# Patient Record
Sex: MALE | Race: WHITE
[De-identification: names, ages, dates, MRNs, and addresses within clinical notes are randomized per-mention and may not be internally consistent; named-entity substitution may affect disease eponyms.]

---

## 2017-01-07 ENCOUNTER — HOSPITAL ENCOUNTER (EMERGENCY)
Dept: HOSPITAL 71 - ER | Age: 37
Discharge: HOME | End: 2017-01-07
Payer: MEDICARE

## 2017-01-07 PROCEDURE — 81003 URINALYSIS AUTO W/O SCOPE: CPT

## 2017-01-07 PROCEDURE — 87804 INFLUENZA ASSAY W/OPTIC: CPT

## 2017-01-07 PROCEDURE — 99283 EMERGENCY DEPT VISIT LOW MDM: CPT

## 2017-01-07 PROCEDURE — 71010: CPT

## 2017-01-07 PROCEDURE — 96375 TX/PRO/DX INJ NEW DRUG ADDON: CPT

## 2017-01-07 PROCEDURE — 80053 COMPREHEN METABOLIC PANEL: CPT

## 2017-01-07 PROCEDURE — 36415 COLL VENOUS BLD VENIPUNCTURE: CPT

## 2017-01-07 PROCEDURE — 96365 THER/PROPH/DIAG IV INF INIT: CPT

## 2017-01-07 PROCEDURE — 85025 COMPLETE CBC W/AUTO DIFF WBC: CPT

## 2019-11-30 ENCOUNTER — HOSPITAL ENCOUNTER (OUTPATIENT)
Dept: URGENT CARE | Facility: CLINIC | Age: 39
Discharge: HOME | End: 2019-11-30
Attending: FAMILY MEDICINE

## 2020-08-07 ENCOUNTER — OFFICE VISIT CONVERTED (OUTPATIENT)
Dept: UROLOGY | Facility: CLINIC | Age: 40
End: 2020-08-07
Attending: NURSE PRACTITIONER

## 2021-05-10 NOTE — H&P
History and Physical      Patient Name: Jaleel Garcia   Patient ID: [id number]   Sex: Male   YOB: 1980    Primary Care Provider: Nicolás Chatterjee MD   Referring Provider: iNcolás Chatterjee MD    Visit Date: August 7, 2020    Provider: [facility]   Location: [facility]   Location Address: [address] [place] [address]  [id number]   Location Phone: [phone]          Chief Complaint     F/U UTI       History Of Present Illness     Mr. Garcia is a 40 year old gentleman with cognitive delay, enuresis, nocturia, and recent UTI. he was previously on oxybutynin and this did not alleviate his urinary incontinence.  It was previously suspected that his issue was related to his medication and just dulling his response to a full bladder.  Primary care provider sent him for a referral related to his recent urinary tract infection that required hospitalization as urinary tract infections are not common in this age group.  Caregiver reports that he has lost 50 pounds unintentionally over the last 6 months.       Past Medical History  Allergic rhinitis, chronic; Bladder Disorder; Enuresis; Epilepsy; Generalized anxiety disorder; Hyperthyroidism; Hypothyroidism; Mental Retardation; Neurologic disorder; Seizures; Vesicoureteral Reflux         Past Surgical History  Kidney Surgery; Oral Surgery         Medication List  Calcium 500 + D 500 mg(1,250mg) -200 unit oral tablet; Depakote  mg oral tablet extended release 24 hr; desmopressin 0.2 mg oral tablet; Keppra 750 mg oral tablet; Synthroid 50 mcg oral tablet; Zoloft 100 mg oral tablet; Zyrtec 10 mg oral tablet         Allergy List  NO KNOWN DRUG ALLERGIES         Family Medical History  Anxiety Disorder, Generalized; Colon Neoplasm, Sigmoid, Malignant; Lung Neoplasm, Malignant; Heart Disease; Throat Cancer; -Father's Family History Unknown; -Mother's Family History Unknown         Social History  Tobacco (Never)         Review of  "Systems  · Constitutional  o Denies  o : fatigue, fever  · Breasts  o Denies  o : lumps  · Cardiovascular  o Denies  o : chest pain, heart disease, heart attack  · Respiratory  o Denies  o : lung disease, shortness of breath, asthma  · Gastrointestinal  o Denies  o : stomach or bowel disease, blood in stools, ulcers  · Genitourinary  o Admits  o : frequent urination , urgency incontinence, urinary leakage, voiding at night  · Integument  o Denies  o : rash, itching  · Neurologic  o Denies  o : neurologic disease  · Musculoskeletal  o Denies  o : swelling or pain in your lower extremities  · Endocrine  o Denies  o : polyuria, polydipsia  · Psychiatric  o Denies  o : anxiety, depression, hallucinations  · Heme-Lymph  o Denies  o : lightheadedness, easy bleeding, easy bruising      Vitals  Date Time BP Position Site L\R Cuff Size HR RR TEMP (F) WT  HT  BMI kg/m2 BSA m2 O2 Sat        08/07/2020 09:57 AM       15  247lbs 6oz 5'  8\" 37.61 2.32           Physical Examination  · Constitutional  o Appearance  o : well-nourished, well developed, alert, in no acute distress  · Head and Face  o Head  o :   § Inspection  § : atraumatic, normocephalic  o Face  o :   § Inspection  § : no facial lesions  · Eyes  o Sclerae  o : sclerae white  · Ears, Nose, Mouth and Throat  o Ears  o :   § External Ears  § : appearance within normal limits, no lesions present  o Nose  o :   § External Nose  § : appearance normal  · Neck  o Inspection/Palpation  o : normal appearance, trachea midline  · Respiratory  o Respiratory Effort  o : breathing unlabored  o Inspection of Chest  o : normal appearance, no retractions  · Skin and Subcutaneous Tissue  o General Inspection  o : no rashes or lesions present, no lesions present, no areas of discoloration  · Neurologic  o Mental Status Examination  o :   § Orientation  § : grossly oriented to person, place and time  § Speech/Language  § : communication ability within normal limits  o Gait and " Station  o : normal gait, able to stand without difficulty  · Psychiatric  o Judgement and Insight  o : judgment and insight intact, judgement for everyday activities and social situations within normal limits, insight intact  o Mood and Affect  o : mood normal, affect appropriate          Results  · In-Office Procedures  o Lab procedure  § Automated dipstick urinalysis with microscopy (99609)   § Color Ur: Yellow   § Clarity Ur: Clear   § Glucose Ur Ql Strip: Negative   § Bilirub Ur Ql Strip: Negative   § Ketones Ur Ql Strip: Negative   § Sp Gr Ur Qn: 1.020   § Hgb Ur Ql Strip: Negative   § pH Ur-LsCnc: 7.0   § Prot Ur Ql Strip: Negative   § Urobilinogen Ur Strip-mCnc: 0.2   § Nitrite Ur Ql Strip: Negative   § WBC Est Ur Ql Strip: Negative   § RBC UrnS Qn HPF: 0   § WBC UrnS Qn HPF: 0   § Bacteria UrnS Qn HPF: 0   § Crystals UrnS Qn HPF: 0   § Epithelial Cells (non renal): 0 /HPF  § Epithelial Cells (renal): 0   o Surgical procedure  § IOP - Bladder Scan/Residual Urine (80017)   § Specimen vol Ur: 101       Assessment  · Nocturia     788.43/R35.1  · Enuresis     307.6  · Urinary incontinence     788.30/R32    Problems Reconciled  Plan  · Medications  o Medications have been Reconciled  o Transition of Care or Provider Policy  · Instructions  o Urodynamic study as the cause of the patient's urinary incontinence is unclear at this point. It is suspected that the patient is having overflow incontinence versus overactive bladder symptoms although this is been going on for the last 6 years and he is never achieved nighttime urinary continence his entire life.  o Follow Up 1 week post uds to discuss poc                   Electronically Signed by: Urvashi Drummond APRN -Author on August 7, 2020 10:23:51 AM

## 2021-05-15 VITALS — WEIGHT: 247.37 LBS | HEIGHT: 68 IN | BODY MASS INDEX: 37.49 KG/M2 | RESPIRATION RATE: 15 BRPM

## 2021-12-26 ENCOUNTER — APPOINTMENT (OUTPATIENT)
Dept: CT IMAGING | Facility: HOSPITAL | Age: 41
End: 2021-12-26

## 2021-12-26 ENCOUNTER — APPOINTMENT (OUTPATIENT)
Dept: GENERAL RADIOLOGY | Facility: HOSPITAL | Age: 41
End: 2021-12-26

## 2021-12-26 ENCOUNTER — HOSPITAL ENCOUNTER (OUTPATIENT)
Facility: HOSPITAL | Age: 41
Setting detail: OBSERVATION
Discharge: HOME | End: 2021-12-28
Attending: EMERGENCY MEDICINE | Admitting: STUDENT IN AN ORGANIZED HEALTH CARE EDUCATION/TRAINING PROGRAM

## 2021-12-26 DIAGNOSIS — R56.9 SEIZURE: Primary | ICD-10-CM

## 2021-12-26 LAB
ALBUMIN SERPL-MCNC: 4.6 G/DL (ref 3.5–5.2)
ALBUMIN/GLOB SERPL: 1.3 G/DL
ALP SERPL-CCNC: 74 U/L (ref 39–117)
ALT SERPL W P-5'-P-CCNC: 23 U/L (ref 1–41)
ANION GAP SERPL CALCULATED.3IONS-SCNC: 29.1 MMOL/L (ref 5–15)
AST SERPL-CCNC: 23 U/L (ref 1–40)
BILIRUB SERPL-MCNC: 0.3 MG/DL (ref 0–1.2)
BILIRUB UR QL STRIP: NEGATIVE
BUN SERPL-MCNC: 13 MG/DL (ref 6–20)
BUN/CREAT SERPL: 11.5 (ref 7–25)
CALCIUM SPEC-SCNC: 9.5 MG/DL (ref 8.6–10.5)
CHLORIDE SERPL-SCNC: 97 MMOL/L (ref 98–107)
CK SERPL-CCNC: 141 U/L (ref 20–200)
CLARITY UR: CLEAR
CLUMPED PLATELETS: PRESENT
CO2 SERPL-SCNC: 10.9 MMOL/L (ref 22–29)
COLOR UR: YELLOW
CREAT SERPL-MCNC: 1.13 MG/DL (ref 0.76–1.27)
D-LACTATE SERPL-SCNC: 17.8 MMOL/L (ref 0.5–2)
D-LACTATE SERPL-SCNC: 2.7 MMOL/L (ref 0.5–2)
DEPRECATED RDW RBC AUTO: 42.4 FL (ref 37–54)
ERYTHROCYTE [DISTWIDTH] IN BLOOD BY AUTOMATED COUNT: 12.9 % (ref 12.3–15.4)
GFR SERPL CREATININE-BSD FRML MDRD: 72 ML/MIN/1.73
GLOBULIN UR ELPH-MCNC: 3.5 GM/DL
GLUCOSE SERPL-MCNC: 104 MG/DL (ref 65–99)
GLUCOSE UR STRIP-MCNC: NEGATIVE MG/DL
HCT VFR BLD AUTO: 48 % (ref 37.5–51)
HGB BLD-MCNC: 16.2 G/DL (ref 13–17.7)
HGB UR QL STRIP.AUTO: NEGATIVE
INR PPP: 1.1 (ref 2–3)
KETONES UR QL STRIP: NEGATIVE
LARGE PLATELETS: ABNORMAL
LEUKOCYTE ESTERASE UR QL STRIP.AUTO: NEGATIVE
LIPASE SERPL-CCNC: 25 U/L (ref 13–60)
LYMPHOCYTES # BLD MANUAL: 7.7 10*3/MM3 (ref 0.7–3.1)
LYMPHOCYTES NFR BLD MANUAL: 4 % (ref 5–12)
MAGNESIUM SERPL-MCNC: 2.8 MG/DL (ref 1.6–2.6)
MCH RBC QN AUTO: 30.5 PG (ref 26.6–33)
MCHC RBC AUTO-ENTMCNC: 33.8 G/DL (ref 31.5–35.7)
MCV RBC AUTO: 90.4 FL (ref 79–97)
MONOCYTES # BLD: 0.51 10*3/MM3 (ref 0.1–0.9)
NEUTROPHILS # BLD AUTO: 4.62 10*3/MM3 (ref 1.7–7)
NEUTROPHILS NFR BLD MANUAL: 34 % (ref 42.7–76)
NEUTS BAND NFR BLD MANUAL: 2 % (ref 0–5)
NITRITE UR QL STRIP: NEGATIVE
PH UR STRIP.AUTO: 6 [PH] (ref 5–8)
PLATELET # BLD AUTO: 253 10*3/MM3 (ref 140–450)
PMV BLD AUTO: 11.3 FL (ref 6–12)
POTASSIUM SERPL-SCNC: 3.9 MMOL/L (ref 3.5–5.2)
PROT SERPL-MCNC: 8.1 G/DL (ref 6–8.5)
PROT UR QL STRIP: ABNORMAL
PROTHROMBIN TIME: 11.4 SECONDS (ref 9.4–12)
RBC # BLD AUTO: 5.31 10*6/MM3 (ref 4.14–5.8)
RBC MORPH BLD: NORMAL
SMALL PLATELETS BLD QL SMEAR: ADEQUATE
SODIUM SERPL-SCNC: 137 MMOL/L (ref 136–145)
SP GR UR STRIP: 1.01 (ref 1–1.03)
T4 FREE SERPL-MCNC: 1.02 NG/DL (ref 0.93–1.7)
TSH SERPL DL<=0.05 MIU/L-ACNC: 3.1 UIU/ML (ref 0.27–4.2)
UROBILINOGEN UR QL STRIP: ABNORMAL
VALPROATE SERPL-MCNC: 76.6 MCG/ML (ref 50–125)
VARIANT LYMPHS NFR BLD MANUAL: 23 % (ref 0–5)
VARIANT LYMPHS NFR BLD MANUAL: 37 % (ref 19.6–45.3)
WBC MORPH BLD: NORMAL
WBC NRBC COR # BLD: 12.84 10*3/MM3 (ref 3.4–10.8)

## 2021-12-26 PROCEDURE — 96375 TX/PRO/DX INJ NEW DRUG ADDON: CPT

## 2021-12-26 PROCEDURE — G0378 HOSPITAL OBSERVATION PER HR: HCPCS

## 2021-12-26 PROCEDURE — 83690 ASSAY OF LIPASE: CPT | Performed by: EMERGENCY MEDICINE

## 2021-12-26 PROCEDURE — 84443 ASSAY THYROID STIM HORMONE: CPT | Performed by: EMERGENCY MEDICINE

## 2021-12-26 PROCEDURE — 99219 PR INITIAL OBSERVATION CARE/DAY 50 MINUTES: CPT | Performed by: PHYSICIAN ASSISTANT

## 2021-12-26 PROCEDURE — 85025 COMPLETE CBC W/AUTO DIFF WBC: CPT | Performed by: EMERGENCY MEDICINE

## 2021-12-26 PROCEDURE — 71045 X-RAY EXAM CHEST 1 VIEW: CPT

## 2021-12-26 PROCEDURE — 83605 ASSAY OF LACTIC ACID: CPT | Performed by: EMERGENCY MEDICINE

## 2021-12-26 PROCEDURE — 82550 ASSAY OF CK (CPK): CPT | Performed by: EMERGENCY MEDICINE

## 2021-12-26 PROCEDURE — 36415 COLL VENOUS BLD VENIPUNCTURE: CPT

## 2021-12-26 PROCEDURE — 99284 EMERGENCY DEPT VISIT MOD MDM: CPT

## 2021-12-26 PROCEDURE — 70450 CT HEAD/BRAIN W/O DYE: CPT

## 2021-12-26 PROCEDURE — 96365 THER/PROPH/DIAG IV INF INIT: CPT

## 2021-12-26 PROCEDURE — 85610 PROTHROMBIN TIME: CPT | Performed by: EMERGENCY MEDICINE

## 2021-12-26 PROCEDURE — 80164 ASSAY DIPROPYLACETIC ACD TOT: CPT | Performed by: EMERGENCY MEDICINE

## 2021-12-26 PROCEDURE — 81003 URINALYSIS AUTO W/O SCOPE: CPT | Performed by: EMERGENCY MEDICINE

## 2021-12-26 PROCEDURE — 82962 GLUCOSE BLOOD TEST: CPT

## 2021-12-26 PROCEDURE — 0 LEVETIRACETAM IN NACL 0.75% 1000 MG/100ML SOLUTION: Performed by: EMERGENCY MEDICINE

## 2021-12-26 PROCEDURE — 80053 COMPREHEN METABOLIC PANEL: CPT | Performed by: EMERGENCY MEDICINE

## 2021-12-26 PROCEDURE — 83735 ASSAY OF MAGNESIUM: CPT | Performed by: EMERGENCY MEDICINE

## 2021-12-26 PROCEDURE — 84439 ASSAY OF FREE THYROXINE: CPT | Performed by: EMERGENCY MEDICINE

## 2021-12-26 PROCEDURE — 85007 BL SMEAR W/DIFF WBC COUNT: CPT | Performed by: EMERGENCY MEDICINE

## 2021-12-26 RX ORDER — ACETAMINOPHEN 160 MG/5ML
650 SOLUTION ORAL EVERY 4 HOURS PRN
Status: DISCONTINUED | OUTPATIENT
Start: 2021-12-26 | End: 2021-12-28 | Stop reason: HOSPADM

## 2021-12-26 RX ORDER — CETIRIZINE HYDROCHLORIDE 10 MG/1
10 TABLET ORAL DAILY
COMMUNITY
End: 2022-10-26 | Stop reason: SDUPTHER

## 2021-12-26 RX ORDER — POLYETHYLENE GLYCOL 3350 17 G/17G
17 POWDER, FOR SOLUTION ORAL DAILY PRN
Status: DISCONTINUED | OUTPATIENT
Start: 2021-12-26 | End: 2021-12-28 | Stop reason: HOSPADM

## 2021-12-26 RX ORDER — BISACODYL 5 MG/1
5 TABLET, DELAYED RELEASE ORAL DAILY PRN
Status: DISCONTINUED | OUTPATIENT
Start: 2021-12-26 | End: 2021-12-28 | Stop reason: HOSPADM

## 2021-12-26 RX ORDER — ACETAMINOPHEN 325 MG/1
650 TABLET ORAL EVERY 4 HOURS PRN
Status: DISCONTINUED | OUTPATIENT
Start: 2021-12-26 | End: 2021-12-28 | Stop reason: HOSPADM

## 2021-12-26 RX ORDER — DIVALPROEX SODIUM 500 MG/1
1000 TABLET, EXTENDED RELEASE ORAL DAILY
COMMUNITY
End: 2022-10-26 | Stop reason: SDUPTHER

## 2021-12-26 RX ORDER — SERTRALINE HYDROCHLORIDE 100 MG/1
150 TABLET, FILM COATED ORAL DAILY
COMMUNITY
End: 2022-10-06 | Stop reason: SDUPTHER

## 2021-12-26 RX ORDER — SODIUM CHLORIDE 0.9 % (FLUSH) 0.9 %
10 SYRINGE (ML) INJECTION AS NEEDED
Status: DISCONTINUED | OUTPATIENT
Start: 2021-12-26 | End: 2021-12-28 | Stop reason: HOSPADM

## 2021-12-26 RX ORDER — ACETAMINOPHEN 650 MG/1
650 SUPPOSITORY RECTAL EVERY 4 HOURS PRN
Status: DISCONTINUED | OUTPATIENT
Start: 2021-12-26 | End: 2021-12-28 | Stop reason: HOSPADM

## 2021-12-26 RX ORDER — BISACODYL 10 MG
10 SUPPOSITORY, RECTAL RECTAL DAILY PRN
Status: DISCONTINUED | OUTPATIENT
Start: 2021-12-26 | End: 2021-12-28 | Stop reason: HOSPADM

## 2021-12-26 RX ORDER — AMOXICILLIN 250 MG
2 CAPSULE ORAL 2 TIMES DAILY
Status: DISCONTINUED | OUTPATIENT
Start: 2021-12-26 | End: 2021-12-28 | Stop reason: HOSPADM

## 2021-12-26 RX ORDER — DESMOPRESSIN ACETATE 0.2 MG/1
0.2 TABLET ORAL 2 TIMES DAILY
COMMUNITY
End: 2022-10-26 | Stop reason: SDUPTHER

## 2021-12-26 RX ORDER — LEVETIRACETAM 10 MG/ML
1000 INJECTION INTRAVASCULAR ONCE
Status: COMPLETED | OUTPATIENT
Start: 2021-12-26 | End: 2021-12-26

## 2021-12-26 RX ORDER — SODIUM CHLORIDE 0.9 % (FLUSH) 0.9 %
10 SYRINGE (ML) INJECTION EVERY 12 HOURS SCHEDULED
Status: DISCONTINUED | OUTPATIENT
Start: 2021-12-26 | End: 2021-12-28 | Stop reason: HOSPADM

## 2021-12-26 RX ORDER — DIVALPROEX SODIUM 500 MG/1
1000 TABLET, EXTENDED RELEASE ORAL DAILY
Status: DISCONTINUED | OUTPATIENT
Start: 2021-12-27 | End: 2021-12-28 | Stop reason: HOSPADM

## 2021-12-26 RX ORDER — LEVETIRACETAM 750 MG/1
750 TABLET ORAL 2 TIMES DAILY
COMMUNITY
End: 2022-09-30 | Stop reason: SDUPTHER

## 2021-12-26 RX ORDER — LEVOTHYROXINE SODIUM 0.05 MG/1
50 TABLET ORAL
COMMUNITY
End: 2022-10-26 | Stop reason: SDUPTHER

## 2021-12-26 RX ADMIN — SODIUM CHLORIDE, PRESERVATIVE FREE 10 ML: 5 INJECTION INTRAVENOUS at 23:41

## 2021-12-26 RX ADMIN — LEVETIRACETAM 1000 MG: 10 INJECTION, SOLUTION INTRAVENOUS at 18:33

## 2021-12-26 RX ADMIN — SODIUM CHLORIDE 1000 ML: 9 INJECTION, SOLUTION INTRAVENOUS at 18:35

## 2021-12-26 RX ADMIN — SODIUM CHLORIDE 1000 ML: 9 INJECTION, SOLUTION INTRAVENOUS at 20:08

## 2021-12-26 RX ADMIN — VALPROATE SODIUM 500 MG: 100 INJECTION, SOLUTION INTRAVENOUS at 20:59

## 2021-12-27 LAB
ANION GAP SERPL CALCULATED.3IONS-SCNC: 10.8 MMOL/L (ref 5–15)
BUN SERPL-MCNC: 10 MG/DL (ref 6–20)
BUN/CREAT SERPL: 12.5 (ref 7–25)
CALCIUM SPEC-SCNC: 8.7 MG/DL (ref 8.6–10.5)
CHLORIDE SERPL-SCNC: 105 MMOL/L (ref 98–107)
CO2 SERPL-SCNC: 22.2 MMOL/L (ref 22–29)
CREAT SERPL-MCNC: 0.8 MG/DL (ref 0.76–1.27)
DEPRECATED RDW RBC AUTO: 42.1 FL (ref 37–54)
ERYTHROCYTE [DISTWIDTH] IN BLOOD BY AUTOMATED COUNT: 13.2 % (ref 12.3–15.4)
GFR SERPL CREATININE-BSD FRML MDRD: 107 ML/MIN/1.73
GLUCOSE SERPL-MCNC: 86 MG/DL (ref 65–99)
HCT VFR BLD AUTO: 40.7 % (ref 37.5–51)
HGB BLD-MCNC: 14.1 G/DL (ref 13–17.7)
MCH RBC QN AUTO: 30.3 PG (ref 26.6–33)
MCHC RBC AUTO-ENTMCNC: 34.6 G/DL (ref 31.5–35.7)
MCV RBC AUTO: 87.5 FL (ref 79–97)
PLATELET # BLD AUTO: 155 10*3/MM3 (ref 140–450)
PMV BLD AUTO: 11 FL (ref 6–12)
POTASSIUM SERPL-SCNC: 4.1 MMOL/L (ref 3.5–5.2)
RBC # BLD AUTO: 4.65 10*6/MM3 (ref 4.14–5.8)
SODIUM SERPL-SCNC: 138 MMOL/L (ref 136–145)
WBC NRBC COR # BLD: 9.45 10*3/MM3 (ref 3.4–10.8)

## 2021-12-27 PROCEDURE — G0378 HOSPITAL OBSERVATION PER HR: HCPCS

## 2021-12-27 PROCEDURE — 99225 PR SBSQ OBSERVATION CARE/DAY 25 MINUTES: CPT | Performed by: STUDENT IN AN ORGANIZED HEALTH CARE EDUCATION/TRAINING PROGRAM

## 2021-12-27 PROCEDURE — 36415 COLL VENOUS BLD VENIPUNCTURE: CPT | Performed by: PHYSICIAN ASSISTANT

## 2021-12-27 PROCEDURE — 99203 OFFICE O/P NEW LOW 30 MIN: CPT | Performed by: PSYCHIATRY & NEUROLOGY

## 2021-12-27 PROCEDURE — 85027 COMPLETE CBC AUTOMATED: CPT | Performed by: PHYSICIAN ASSISTANT

## 2021-12-27 PROCEDURE — 80048 BASIC METABOLIC PNL TOTAL CA: CPT | Performed by: PHYSICIAN ASSISTANT

## 2021-12-27 RX ORDER — DESMOPRESSIN ACETATE 0.1 MG/1
200 TABLET ORAL 2 TIMES DAILY
Status: DISCONTINUED | OUTPATIENT
Start: 2021-12-27 | End: 2021-12-28 | Stop reason: HOSPADM

## 2021-12-27 RX ORDER — LEVOTHYROXINE SODIUM 0.05 MG/1
50 TABLET ORAL
Status: DISCONTINUED | OUTPATIENT
Start: 2021-12-27 | End: 2021-12-28 | Stop reason: HOSPADM

## 2021-12-27 RX ORDER — CETIRIZINE HYDROCHLORIDE 10 MG/1
10 TABLET ORAL DAILY
Status: DISCONTINUED | OUTPATIENT
Start: 2021-12-27 | End: 2021-12-28 | Stop reason: HOSPADM

## 2021-12-27 RX ADMIN — DOCUSATE SODIUM 50MG AND SENNOSIDES 8.6MG 2 TABLET: 8.6; 5 TABLET, FILM COATED ORAL at 08:32

## 2021-12-27 RX ADMIN — LEVETIRACETAM 750 MG: 250 TABLET, FILM COATED ORAL at 08:32

## 2021-12-27 RX ADMIN — LEVOTHYROXINE SODIUM 50 MCG: 50 TABLET ORAL at 08:32

## 2021-12-27 RX ADMIN — DIVALPROEX SODIUM 1000 MG: 500 TABLET, EXTENDED RELEASE ORAL at 08:32

## 2021-12-27 RX ADMIN — DOCUSATE SODIUM 50MG AND SENNOSIDES 8.6MG 2 TABLET: 8.6; 5 TABLET, FILM COATED ORAL at 21:04

## 2021-12-27 RX ADMIN — SODIUM CHLORIDE, PRESERVATIVE FREE 10 ML: 5 INJECTION INTRAVENOUS at 21:04

## 2021-12-27 RX ADMIN — SODIUM CHLORIDE, PRESERVATIVE FREE 10 ML: 5 INJECTION INTRAVENOUS at 08:35

## 2021-12-27 RX ADMIN — DESMOPRESSIN ACETATE 200 MCG: 0.1 TABLET ORAL at 21:04

## 2021-12-27 RX ADMIN — CETIRIZINE HYDROCHLORIDE 10 MG: 10 TABLET, FILM COATED ORAL at 08:35

## 2021-12-27 RX ADMIN — SERTRALINE HYDROCHLORIDE 150 MG: 100 TABLET ORAL at 08:32

## 2021-12-27 RX ADMIN — LEVETIRACETAM 750 MG: 250 TABLET, FILM COATED ORAL at 21:04

## 2021-12-27 RX ADMIN — DESMOPRESSIN ACETATE 200 MCG: 0.1 TABLET ORAL at 08:32

## 2021-12-28 VITALS
SYSTOLIC BLOOD PRESSURE: 118 MMHG | DIASTOLIC BLOOD PRESSURE: 71 MMHG | HEIGHT: 72 IN | TEMPERATURE: 97.6 F | WEIGHT: 203.26 LBS | BODY MASS INDEX: 27.53 KG/M2 | HEART RATE: 63 BPM | OXYGEN SATURATION: 100 % | RESPIRATION RATE: 18 BRPM

## 2021-12-28 LAB
ALBUMIN SERPL-MCNC: 3.5 G/DL (ref 3.5–5.2)
ALBUMIN/GLOB SERPL: 1.3 G/DL
ALP SERPL-CCNC: 46 U/L (ref 39–117)
ALT SERPL W P-5'-P-CCNC: 17 U/L (ref 1–41)
ANION GAP SERPL CALCULATED.3IONS-SCNC: 7.7 MMOL/L (ref 5–15)
AST SERPL-CCNC: 31 U/L (ref 1–40)
BASOPHILS # BLD AUTO: 0.02 10*3/MM3 (ref 0–0.2)
BASOPHILS NFR BLD AUTO: 0.3 % (ref 0–1.5)
BILIRUB SERPL-MCNC: 0.8 MG/DL (ref 0–1.2)
BUN SERPL-MCNC: 12 MG/DL (ref 6–20)
BUN/CREAT SERPL: 18.2 (ref 7–25)
CALCIUM SPEC-SCNC: 8.8 MG/DL (ref 8.6–10.5)
CHLORIDE SERPL-SCNC: 101 MMOL/L (ref 98–107)
CO2 SERPL-SCNC: 24.3 MMOL/L (ref 22–29)
CREAT SERPL-MCNC: 0.66 MG/DL (ref 0.76–1.27)
DEPRECATED RDW RBC AUTO: 42.2 FL (ref 37–54)
EOSINOPHIL # BLD AUTO: 0.06 10*3/MM3 (ref 0–0.4)
EOSINOPHIL NFR BLD AUTO: 0.8 % (ref 0.3–6.2)
ERYTHROCYTE [DISTWIDTH] IN BLOOD BY AUTOMATED COUNT: 13.1 % (ref 12.3–15.4)
GFR SERPL CREATININE-BSD FRML MDRD: 134 ML/MIN/1.73
GLOBULIN UR ELPH-MCNC: 2.8 GM/DL
GLUCOSE BLDC GLUCOMTR-MCNC: 94 MG/DL (ref 70–99)
GLUCOSE SERPL-MCNC: 89 MG/DL (ref 65–99)
HCT VFR BLD AUTO: 40.7 % (ref 37.5–51)
HGB BLD-MCNC: 13.9 G/DL (ref 13–17.7)
IMM GRANULOCYTES # BLD AUTO: 0.01 10*3/MM3 (ref 0–0.05)
IMM GRANULOCYTES NFR BLD AUTO: 0.1 % (ref 0–0.5)
LYMPHOCYTES # BLD AUTO: 4.29 10*3/MM3 (ref 0.7–3.1)
LYMPHOCYTES NFR BLD AUTO: 56.2 % (ref 19.6–45.3)
MAGNESIUM SERPL-MCNC: 1.9 MG/DL (ref 1.6–2.6)
MCH RBC QN AUTO: 30.2 PG (ref 26.6–33)
MCHC RBC AUTO-ENTMCNC: 34.2 G/DL (ref 31.5–35.7)
MCV RBC AUTO: 88.3 FL (ref 79–97)
MONOCYTES # BLD AUTO: 0.54 10*3/MM3 (ref 0.1–0.9)
MONOCYTES NFR BLD AUTO: 7.1 % (ref 5–12)
NEUTROPHILS NFR BLD AUTO: 2.72 10*3/MM3 (ref 1.7–7)
NEUTROPHILS NFR BLD AUTO: 35.5 % (ref 42.7–76)
NRBC BLD AUTO-RTO: 0.5 /100 WBC (ref 0–0.2)
PLAT MORPH BLD: NORMAL
PLATELET # BLD AUTO: 152 10*3/MM3 (ref 140–450)
PMV BLD AUTO: 11.5 FL (ref 6–12)
POTASSIUM SERPL-SCNC: 3.9 MMOL/L (ref 3.5–5.2)
PROT SERPL-MCNC: 6.3 G/DL (ref 6–8.5)
RBC # BLD AUTO: 4.61 10*6/MM3 (ref 4.14–5.8)
RBC MORPH BLD: NORMAL
SODIUM SERPL-SCNC: 133 MMOL/L (ref 136–145)
WBC MORPH BLD: NORMAL
WBC NRBC COR # BLD: 7.64 10*3/MM3 (ref 3.4–10.8)

## 2021-12-28 PROCEDURE — 85007 BL SMEAR W/DIFF WBC COUNT: CPT | Performed by: STUDENT IN AN ORGANIZED HEALTH CARE EDUCATION/TRAINING PROGRAM

## 2021-12-28 PROCEDURE — 80053 COMPREHEN METABOLIC PANEL: CPT | Performed by: STUDENT IN AN ORGANIZED HEALTH CARE EDUCATION/TRAINING PROGRAM

## 2021-12-28 PROCEDURE — 83735 ASSAY OF MAGNESIUM: CPT | Performed by: STUDENT IN AN ORGANIZED HEALTH CARE EDUCATION/TRAINING PROGRAM

## 2021-12-28 PROCEDURE — 99217 PR OBSERVATION CARE DISCHARGE MANAGEMENT: CPT | Performed by: STUDENT IN AN ORGANIZED HEALTH CARE EDUCATION/TRAINING PROGRAM

## 2021-12-28 PROCEDURE — 85025 COMPLETE CBC W/AUTO DIFF WBC: CPT | Performed by: STUDENT IN AN ORGANIZED HEALTH CARE EDUCATION/TRAINING PROGRAM

## 2021-12-28 PROCEDURE — G0378 HOSPITAL OBSERVATION PER HR: HCPCS

## 2021-12-28 RX ADMIN — LEVETIRACETAM 750 MG: 250 TABLET, FILM COATED ORAL at 09:16

## 2021-12-28 RX ADMIN — DESMOPRESSIN ACETATE 200 MCG: 0.1 TABLET ORAL at 09:17

## 2021-12-28 RX ADMIN — CETIRIZINE HYDROCHLORIDE 10 MG: 10 TABLET, FILM COATED ORAL at 09:16

## 2021-12-28 RX ADMIN — SERTRALINE HYDROCHLORIDE 150 MG: 100 TABLET ORAL at 09:16

## 2021-12-28 RX ADMIN — SODIUM CHLORIDE, PRESERVATIVE FREE 10 ML: 5 INJECTION INTRAVENOUS at 09:18

## 2021-12-28 RX ADMIN — LEVOTHYROXINE SODIUM 50 MCG: 50 TABLET ORAL at 05:45

## 2021-12-28 RX ADMIN — DIVALPROEX SODIUM 1000 MG: 500 TABLET, EXTENDED RELEASE ORAL at 09:16

## 2021-12-29 ENCOUNTER — READMISSION MANAGEMENT (OUTPATIENT)
Dept: CALL CENTER | Facility: HOSPITAL | Age: 41
End: 2021-12-29

## 2021-12-29 NOTE — OUTREACH NOTE
Prep Survey      Responses   [facility] patient discharged from? [facility]   Is LACE score < 7 ? Yes   Emergency Room discharge w/ pulse ox? No   Eligibility Not Eligible   What are the reasons patient is not eligible? Other   Does the patient have one of the following disease processes/diagnoses(primary or secondary)? Other   Prep survey completed? Yes          Mee Kelsey RN

## 2022-01-31 ENCOUNTER — OFFICE VISIT (OUTPATIENT)
Dept: NEUROLOGY | Facility: CLINIC | Age: 42
End: 2022-01-31

## 2022-01-31 VITALS
HEART RATE: 75 BPM | WEIGHT: 197 LBS | BODY MASS INDEX: 26.72 KG/M2 | SYSTOLIC BLOOD PRESSURE: 120 MMHG | DIASTOLIC BLOOD PRESSURE: 66 MMHG

## 2022-01-31 DIAGNOSIS — R56.9 SEIZURE: Primary | ICD-10-CM

## 2022-01-31 PROCEDURE — 99213 OFFICE O/P EST LOW 20 MIN: CPT | Performed by: PSYCHIATRY & NEUROLOGY

## 2022-01-31 NOTE — PROGRESS NOTES
[facility]   Neurology Progress Note    Patient Name: Jaleel Garcia  : 1980  MRN: [id number]  Primary Care Physician:  Nicolás Chatetrjee MD  Referring Physician: No ref. provider found  Date of admission: (Not on file)    Subjective   Subjective     Reason for Consult/ Chief Complaint: Follow-up visit for seizure.  HPI:  Jaleel Garcia is a 41 y.o. male follow-up visit for seizure.  His mother is with him today.  He was admitted to the hospital last month since he had a seizure while standing up and he fell backwards and started jerking.  His mother states that he has not had any seizures for years.  They are usually seated by a throbbing pain in his eye and then he looks down and to the left side and then he starts shaking.  This 1 was different according to the mother.  His Depakote level was therapeutic and we start him on levetiracetam 750 mg twice a day.  According to his mother he is not seeing another neurologist.  He is seeing his primary care provider in [place].        Objective     Vitals:   Heart Rate:  [75] 75  BP: (120)/(66) 120/66    Physical Exam: Alert, follows commands.  He has intellectual disability.  He would not shake my hand initially when I came in the room however when I introduced myself as a doctor stop in the hospital he stood up and shook my hand.  In the end of the examination he gave me a hug.  His heart is regular rhythm normal in rate      Result Review    Result Review:  I have personally reviewed the results from the time of this admission to 2022 12:29 EST and agree with these findings:  []  Laboratory  []  Microbiology  []  Radiology  []  EKG/Telemetry   []  Cardiology/Vascular   []  Pathology  [x]  Old records  []  Other:      Assessment/Plan   Assessment / Plan   Active Hospital Problems:  There are no active hospital problems to display for this patient.        Plan: He is to continue taking levetiracetam 750 mg twice a day and Depakote   mg 2 tablets daily.  I will see him again in 8 months time for follow-up.  Should have any recurrent seizures they are to inform me.    Total time spent with the patient and coordinating patient care was 20 minutes.    electronically signed by Bruce Ponce MD, 01/31/22, 12:25 PM EST.

## 2022-06-25 PROBLEM — E03.8 OTHER SPECIFIED HYPOTHYROIDISM: Status: ACTIVE | Noted: 2022-06-25

## 2022-06-25 PROBLEM — G40.909 SEIZURE DISORDER: Status: ACTIVE | Noted: 2021-12-26

## 2022-06-25 NOTE — PATIENT INSTRUCTIONS
Cooking With Less Salt  Cooking with less salt is one way to reduce the amount of sodium you get from food. Sodium is one of the elements that make up salt. It is found naturally in foods and is also added to certain foods. Depending on your condition and overall health, your health care provider or dietitian may recommend that you reduce your sodium intake. Most people should have less than 2,300 milligrams (mg) of sodium each day. If you have high blood pressure (hypertension), you may need to limit your sodium to 1,500 mg each day. Follow the tips below to help reduce your sodium intake.  What are tips for eating less sodium?  Reading food labels       Check the food label before buying or using packaged ingredients. Always check the label for the serving size and sodium content.  Look for products with no more than 140 mg of sodium in one serving.  Check the % Daily Value column to see what percent of the daily recommended amount of sodium is provided in one serving of the product. Foods with 5% or less in this column are considered low in sodium. Foods with 20% or higher are considered high in sodium.  Do not choose foods with salt as one of the first three ingredients on the ingredients list. If salt is one of the first three ingredients, it usually means the item is high in sodium.     Shopping  Buy sodium-free or low-sodium products. Look for the following words on food labels:  Low-sodium.  Sodium-free.  Reduced-sodium.  No salt added.  Unsalted.  Always check the sodium content even if foods are labeled as low-sodium or no salt added.  Buy fresh foods.  Cooking  Use herbs, seasonings without salt, and spices as substitutes for salt.  Use sodium-free baking soda when baking.  [place], braise, or roast foods to add flavor with less salt.  Avoid adding salt to pasta, rice, or hot cereals.  Drain and rinse canned vegetables, beans, and meat before use.  Avoid adding salt when cooking sweets and desserts.  Cook  "with low-sodium ingredients.  What foods are high in sodium?  Vegetables  Regular canned vegetables (not low-sodium or reduced-sodium). Sauerkraut, pickled vegetables, and relishes. Olives. French fries. Onion rings. Regular canned tomato sauce and paste. Regular tomato and vegetable juice. Frozen vegetables in sauces.  Grains  Instant hot cereals. Bread stuffing, pancake, and biscuit mixes. Croutons. Seasoned rice or pasta mixes. Noodle soup cups. Boxed or frozen macaroni and cheese. Regular salted crackers. Self-rising flour. Rolls. Bagels. Flour tortillas and wraps.  Meats and other proteins  Meat or fish that is salted, canned, smoked, cured, spiced, or pickled. This includes santiago, ham, sausages, hot dogs, corned beef, chipped beef, meat loaves, salt pork, jerky, pickled herring, anchovies, regular canned tuna, and sardines. Salted nuts.  Dairy  Processed cheese and cheese spreads. Cheese curds. Blue cheese. Feta cheese. String cheese. Regular cottage cheese. Buttermilk. Canned milk.  The items listed above may not be a complete list of foods high in sodium. Actual amounts of sodium may be different depending on processing. Contact a dietitian for more information.  What foods are low in sodium?  Fruits  Fresh, frozen, or canned fruit with no sauce added. Fruit juice.  Vegetables  Fresh or frozen vegetables with no sauce added. \"No salt added\" canned vegetables. \"No salt added\" tomato sauce and paste. Low-sodium or reduced-sodium tomato and vegetable juice.  Grains  Noodles, pasta, quinoa, rice. Shredded or puffed wheat or puffed rice. Regular or quick oats (not instant). Low-sodium crackers. Low-sodium bread. Whole-grain bread and whole-grain pasta. Unsalted popcorn.  Meats and other proteins  Fresh or frozen whole meats, poultry (not injected with sodium), and fish with no sauce added. Unsalted nuts. Dried peas, beans, and lentils without added salt. Unsalted canned beans. Eggs. Unsalted nut butters. " Low-sodium canned tuna or chicken.  Dairy  Milk. Soy milk. Yogurt. Low-sodium cheeses, such as Swiss, [place], mozzarella, and ricotta. Sherbet or ice cream (keep to ½ cup per serving). Cream cheese.  Fats and oils  Unsalted butter or margarine.  Other foods  Homemade pudding. Sodium-free baking soda and baking powder. Herbs and spices. Low-sodium seasoning mixes.  Beverages  Coffee and tea. Carbonated beverages.  The items listed above may not be a complete list of foods low in sodium. Actual amounts of sodium may be different depending on processing. Contact a dietitian for more information.  What are some salt alternatives when cooking?  The following are herbs, seasonings, and spices that can be used instead of salt to flavor your food. Herbs should be fresh or dried. Do not choose packaged mixes. Next to the name of the herb, spice, or seasoning are some examples of foods you can pair it with.  Herbs  Bay leaves - Soups, meat and vegetable dishes, and spaghetti sauce.  Basil - Italian dishes, soups, pasta, and fish dishes.  Cilantro - Meat, poultry, and vegetable dishes.  Chili powder - Marinades and Mexican dishes.  Chives - Salad dressings and potato dishes.  Cumin - Mexican dishes, couscous, and meat dishes.  Dill - Fish dishes, sauces, and salads.  Fennel - Meat and vegetable dishes, breads, and cookies.  Garlic (do not use garlic salt) - Italian dishes, meat dishes, salad dressings, and sauces.  Marjoram - Soups, potato dishes, and meat dishes.  Oregano - Pizza and spaghetti sauce.  Parsley - Salads, soups, pasta, and meat dishes.  Quynh - Italian dishes, salad dressings, soups, and red meats.  Saffron - Fish dishes, pasta, and some poultry dishes.  Tony - Stuffings and sauces.  Tarragon - Fish and poultry dishes.  Thyme - Stuffing, meat, and fish dishes.  Seasonings  Lemon juice - Fish dishes, poultry dishes, vegetables, and salads.  Vinegar - Salad dressings, vegetables, and fish  "dishes.  Spices  Cinnamon - Sweet dishes, such as cakes, cookies, and puddings.  Cloves - Gingerbread, puddings, and marinades for meats.  Shukla - Vegetable dishes, fish and poultry dishes, and stir-mello dishes.  Martha - Vegetable dishes, fish dishes, and stir-mello dishes.  Nutmeg - Pasta, vegetables, poultry, fish dishes, and custard.  Summary  Cooking with less salt is one way to reduce the amount of sodium that you get from food.  Buy sodium-free or low-sodium products.  Check the food label before using or buying packaged ingredients.  Use herbs, seasonings without salt, and spices as substitutes for salt in foods.  This information is not intended to replace advice given to you by your health care provider. Make sure you discuss any questions you have with your health care provider.  Document Revised: 12/09/2020 Document Reviewed: 12/09/2020  [employer] Patient Education © 2021 [employer]      https://www.nhlbi.nih.gov/files/docs/public/heart/dash_brief.pdf\">   DASH Eating Plan  DASH stands for Dietary Approaches to Stop Hypertension. The DASH eating plan is a healthy eating plan that has been shown to:  Reduce high blood pressure (hypertension).  Reduce your risk for type 2 diabetes, heart disease, and stroke.  Help with weight loss.  What are tips for following this plan?  Reading food labels  Check food labels for the amount of salt (sodium) per serving. Choose foods with less than 5 percent of the Daily Value of sodium. Generally, foods with less than 300 milligrams (mg) of sodium per serving fit into this eating plan.  To find whole grains, look for the word \"whole\" as the first word in the ingredient list.  Shopping  Buy products labeled as \"low-sodium\" or \"no salt added.\"  Buy fresh foods. Avoid canned foods and pre-made or frozen meals.  Cooking  Avoid adding salt when cooking. Use salt-free seasonings or herbs instead of table salt or sea salt. Check with your health care provider or pharmacist before " using salt substitutes.  Do not mello foods. Cook foods using healthy methods such as baking, boiling, grilling, roasting, and broiling instead.  Cook with heart-healthy oils, such as olive, canola, avocado, soybean, or sunflower oil.  Meal planning       Eat a balanced diet that includes:  4 or more servings of fruits and 4 or more servings of vegetables each day. Try to fill one-half of your plate with fruits and vegetables.  6-8 servings of whole grains each day.  Less than 6 oz (170 g) of lean meat, poultry, or fish each day. A 3-oz (85-g) serving of meat is about the same size as a deck of cards. One egg equals 1 oz (28 g).  2-3 servings of low-fat dairy each day. One serving is 1 cup (237 mL).  1 serving of nuts, seeds, or beans 5 times each week.  2-3 servings of heart-healthy fats. Healthy fats called omega-3 fatty acids are found in foods such as walnuts, flaxseeds, fortified milks, and eggs. These fats are also found in cold-water fish, such as sardines, salmon, and mackerel.  Limit how much you eat of:  Canned or prepackaged foods.  Food that is high in trans fat, such as some fried foods.  Food that is high in saturated fat, such as fatty meat.  Desserts and other sweets, sugary drinks, and other foods with added sugar.  Full-fat dairy products.  Do not salt foods before eating.  Do not eat more than 4 egg yolks a week.  Try to eat at least 2 vegetarian meals a week.  Eat more home-cooked food and less restaurant, buffet, and fast food.     Lifestyle  When eating at a restaurant, ask that your food be prepared with less salt or no salt, if possible.  If you drink alcohol:  Limit how much you use to:  0-1 drink a day for women who are not pregnant.  0-2 drinks a day for men.  Be aware of how much alcohol is in your drink. In the [place] one drink equals one 12 oz bottle of beer (355 mL), one 5 oz glass of wine (148 mL), or one 1½ oz glass of hard liquor (44 mL).  General information  Avoid eating more than  2,300 mg of salt a day. If you have hypertension, you may need to reduce your sodium intake to 1,500 mg a day.  Work with your health care provider to maintain a healthy body weight or to lose weight. Ask what an ideal weight is for you.  Get at least 30 minutes of exercise that causes your heart to beat faster (aerobic exercise) most days of the week. Activities may include walking, swimming, or biking.  Work with your health care provider or dietitian to adjust your eating plan to your individual calorie needs.  What foods should I eat?  Fruits  All fresh, dried, or frozen fruit. Canned fruit in natural juice (without added sugar).  Vegetables  Fresh or frozen vegetables (raw, steamed, roasted, or grilled). Low-sodium or reduced-sodium tomato and vegetable juice. Low-sodium or reduced-sodium tomato sauce and tomato paste. Low-sodium or reduced-sodium canned vegetables.  Grains  Whole-grain or whole-wheat bread. Whole-grain or whole-wheat pasta. Brown rice. Oatmeal. Quinoa. Bulgur. Whole-grain and low-sodium cereals. [place] bread. Low-fat, low-sodium crackers. Whole-wheat flour tortillas.  Meats and other proteins  Skinless chicken or turkey. Ground chicken or turkey. Pork with fat trimmed off. Fish and seafood. Egg whites. Dried beans, peas, or lentils. Unsalted nuts, nut butters, and seeds. Unsalted canned beans. Lean cuts of beef with fat trimmed off. Low-sodium, lean precooked or cured meat, such as sausages or meat loaves.  Dairy  Low-fat (1%) or fat-free (skim) milk. Reduced-fat, low-fat, or fat-free cheeses. Nonfat, low-sodium ricotta or cottage cheese. Low-fat or nonfat yogurt. Low-fat, low-sodium cheese.  Fats and oils  Soft margarine without trans fats. Vegetable oil. Reduced-fat, low-fat, or light mayonnaise and salad dressings (reduced-sodium). Canola, safflower, olive, avocado, soybean, and sunflower oils. Avocado.  Seasonings and condiments  Herbs. Spices. Seasoning mixes without salt.  Other  foods  Unsalted popcorn and pretzels. Fat-free sweets.  The items listed above may not be a complete list of foods and beverages you can eat. Contact a dietitian for more information.  What foods should I avoid?  Fruits  Canned fruit in a light or heavy syrup. Fried fruit. Fruit in cream or butter sauce.  Vegetables  Creamed or fried vegetables. Vegetables in a cheese sauce. Regular canned vegetables (not low-sodium or reduced-sodium). Regular canned tomato sauce and paste (not low-sodium or reduced-sodium). Regular tomato and vegetable juice (not low-sodium or reduced-sodium). Pickles. Olives.  Grains  Baked goods made with fat, such as croissants, muffins, or some breads. Dry pasta or rice meal packs.  Meats and other proteins  Fatty cuts of meat. Ribs. Fried meat. Villegas. Bologna, salami, and other precooked or cured meats, such as sausages or meat loaves. Fat from the back of a pig (fatback). Bratwurst. Salted nuts and seeds. Canned beans with added salt. Canned or smoked fish. Whole eggs or egg yolks. Chicken or turkey with skin.  Dairy  Whole or 2% milk, cream, and half-and-half. Whole or full-fat cream cheese. Whole-fat or sweetened yogurt. Full-fat cheese. Nondairy creamers. Whipped toppings. Processed cheese and cheese spreads.  Fats and oils  Butter. Stick margarine. Lard. Shortening. Ghee. Villegas fat. Tropical oils, such as coconut, palm kernel, or palm oil.  Seasonings and condiments  Onion salt, garlic salt, seasoned salt, table salt, and sea salt. Worcestershire sauce. Tartar sauce. Barbecue sauce. Teriyaki sauce. Soy sauce, including reduced-sodium. Steak sauce. Canned and packaged gravies. Fish sauce. Oyster sauce. Cocktail sauce. Store-bought horseradish. Ketchup. Mustard. Meat flavorings and tenderizers. Bouillon cubes. Hot sauces. Pre-made or packaged marinades. Pre-made or packaged taco seasonings. Relishes. Regular salad dressings.  Other foods  Salted popcorn and pretzels.  The items listed above  may not be a complete list of foods and beverages you should avoid. Contact a dietitian for more information.  Where to find more information  National Heart, Lung, and Blood [place]: www.nhlbi.nih.gov  American Heart Association: www.heart.org  Academy of Nutrition and Dietetics: www.eatright.org  National Kidney Foundation: www.kidney.org  Summary  The DASH eating plan is a healthy eating plan that has been shown to reduce high blood pressure (hypertension). It may also reduce your risk for type 2 diabetes, heart disease, and stroke.  When on the DASH eating plan, aim to eat more fresh fruits and vegetables, whole grains, lean proteins, low-fat dairy, and heart-healthy fats.  With the DASH eating plan, you should limit salt (sodium) intake to 2,300 mg a day. If you have hypertension, you may need to reduce your sodium intake to 1,500 mg a day.  Work with your health care provider or dietitian to adjust your eating plan to your individual calorie needs.  This information is not intended to replace advice given to you by your health care provider. Make sure you discuss any questions you have with your health care provider.  Document Revised: 11/20/2020 Document Reviewed: 11/20/2020  [employer] Patient Education © 2021 [employer].       Heart-Healthy Eating Plan  Heart-healthy meal planning includes:  Eating less unhealthy fats.  Eating more healthy fats.  Making other changes in your diet.  Talk with your doctor or a diet specialist (dietitian) to create an eating plan that is right for you.  What is my plan?  Your doctor may recommend an eating plan that includes:  Total fat: ______% or less of total calories a day.  Saturated fat: ______% or less of total calories a day.  Cholesterol: less than _________mg a day.  What are tips for following this plan?  Cooking  Avoid frying your food. Try to bake, boil, grill, or broil it instead. You can also reduce fat by:  Removing the skin from poultry.  Removing all  visible fats from meats.  Steaming vegetables in water or broth.  Meal planning       At meals, divide your plate into four equal parts:  Fill one-half of your plate with vegetables and green salads.  Fill one-fourth of your plate with whole grains.  Fill one-fourth of your plate with lean protein foods.  Eat 4-5 servings of vegetables per day. A serving of vegetables is:  1 cup of raw or cooked vegetables.  2 cups of raw leafy greens.  Eat 4-5 servings of fruit per day. A serving of fruit is:  1 medium whole fruit.  ¼ cup of dried fruit.  ½ cup of fresh, frozen, or canned fruit.  ½ cup of 100% fruit juice.  Eat more foods that have soluble fiber. These are apples, broccoli, carrots, beans, peas, and barley. Try to get 20-30 g of fiber per day.  Eat 4-5 servings of nuts, legumes, and seeds per week:  1 serving of dried beans or legumes equals ½ cup after being cooked.  1 serving of nuts is ¼ cup.  1 serving of seeds equals 1 tablespoon.     General information  Eat more home-cooked food. Eat less restaurant, buffet, and fast food.  Limit or avoid alcohol.  Limit foods that are high in starch and sugar.  Avoid fried foods.  Lose weight if you are overweight.  Keep track of how much salt (sodium) you eat. This is important if you have high blood pressure. Ask your doctor to tell you more about this.  Try to add vegetarian meals each week.  Fats  Choose healthy fats. These include olive oil and canola oil, flaxseeds, walnuts, almonds, and seeds.  Eat more omega-3 fats. These include salmon, mackerel, sardines, tuna, flaxseed oil, and ground flaxseeds. Try to eat fish at least 2 times each week.  Check food labels. Avoid foods with trans fats or high amounts of saturated fat.  Limit saturated fats.  These are often found in animal products, such as meats, butter, and cream.  These are also found in plant foods, such as palm oil, palm kernel oil, and coconut oil.  Avoid foods with partially hydrogenated oils in them.  These have trans fats. Examples are stick margarine, some tub margarines, cookies, crackers, and other baked goods.  What foods can I eat?  Fruits  All fresh, canned (in natural juice), or frozen fruits.  Vegetables  Fresh or frozen vegetables (raw, steamed, roasted, or grilled). Green salads.  Grains  Most grains. Choose whole wheat and whole grains most of the time. Rice and pasta, including brown rice and pastas made with whole wheat.  Meats and other proteins  Lean, well-trimmed beef, veal, pork, and lamb. Chicken and turkey without skin. All fish and shellfish. Wild duck, rabbit, pheasant, and venison. Egg whites or low-cholesterol egg substitutes. Dried beans, peas, lentils, and tofu. Seeds and most nuts.  Dairy  Low-fat or nonfat cheeses, including ricotta and mozzarella. Skim or 1% milk that is liquid, powdered, or evaporated. Buttermilk that is made with low-fat milk. Nonfat or low-fat yogurt.  Fats and oils  Non-hydrogenated (trans-free) margarines. Vegetable oils, including soybean, sesame, sunflower, olive, peanut, safflower, corn, canola, and cottonseed. Salad dressings or mayonnaise made with a vegetable oil.  Beverages  Mineral water. Coffee and tea. Diet carbonated beverages.  Sweets and desserts  Sherbet, gelatin, and fruit ice. Small amounts of dark chocolate.  Limit all sweets and desserts.  Seasonings and condiments  All seasonings and condiments.  The items listed above may not be a complete list of foods and drinks you can eat. Contact a dietitian for more options.  What foods should I avoid?  Fruits  Canned fruit in heavy syrup. Fruit in cream or butter sauce. Fried fruit. Limit coconut.  Vegetables  Vegetables cooked in cheese, cream, or butter sauce. Fried vegetables.  Grains  Breads that are made with saturated or trans fats, oils, or whole milk. Croissants. Sweet rolls. Donuts. High-fat crackers, such as cheese crackers.  Meats and other proteins  Fatty meats, such as hot dogs, ribs,  sausage, santiago, rib-eye roast or steak. High-fat deli meats, such as salami and bologna. Caviar. Domestic duck and goose. Organ meats, such as liver.  Dairy  Cream, sour cream, cream cheese, and creamed cottage cheese. Whole-milk cheeses. Whole or 2% milk that is liquid, evaporated, or condensed. Whole buttermilk. Cream sauce or high-fat cheese sauce. Yogurt that is made from whole milk.  Fats and oils  Meat fat, or shortening. Cocoa butter, hydrogenated oils, palm oil, coconut oil, palm kernel oil. Solid fats and shortenings, including santiago fat, salt pork, lard, and butter. Nondairy cream substitutes. Salad dressings with cheese or sour cream.  Beverages  Regular sodas and juice drinks with added sugar.  Sweets and desserts  Frosting. Pudding. Cookies. Cakes. Pies. Milk chocolate or white chocolate. Buttered syrups. Full-fat ice cream or ice cream drinks.  The items listed above may not be a complete list of foods and drinks to avoid. Contact a dietitian for more information.  Summary  Heart-healthy meal planning includes eating less unhealthy fats, eating more healthy fats, and making other changes in your diet.  Eat a balanced diet. This includes fruits and vegetables, low-fat or nonfat dairy, lean protein, nuts and legumes, whole grains, and heart-healthy oils and fats.  This information is not intended to replace advice given to you by your health care provider. Make sure you discuss any questions you have with your health care provider.  Document Revised: 02/21/2019 Document Reviewed: 01/25/2019  [employer] Patient Education © 2021 [employer].       Mediterranean Diet  A Mediterranean diet refers to food and lifestyle choices that are based on the traditions of countries located on the [place]. This way of eating has been shown to help prevent certain conditions and improve outcomes for people who have chronic diseases, like kidney disease and heart disease.  What are tips for following this  plan?  Lifestyle  Cook and eat meals together with your family, when possible.  Drink enough fluid to keep your urine clear or pale yellow.  Be physically active every day. This includes:  Aerobic exercise like running or swimming.  Leisure activities like gardening, walking, or housework.  Get 7-8 hours of sleep each night.  If recommended by your health care provider, drink red wine in moderation. This means 1 glass a day for nonpregnant women and 2 glasses a day for men. A glass of wine equals 5 oz (150 mL).  Reading food labels       Check the serving size of packaged foods. For foods such as rice and pasta, the serving size refers to the amount of cooked product, not dry.  Check the total fat in packaged foods. Avoid foods that have saturated fat or trans fats.  Check the ingredients list for added sugars, such as corn syrup.     Shopping  At the grocery store, buy most of your food from the areas near the walls of the store. This includes:  Fresh fruits and vegetables (produce).  Grains, beans, nuts, and seeds. Some of these may be available in unpackaged forms or large amounts (in bulk).  Fresh seafood.  Poultry and eggs.  Low-fat dairy products.  Buy whole ingredients instead of prepackaged foods.  Buy fresh fruits and vegetables in-season from local farmers markets.  Buy frozen fruits and vegetables in resealable bags.  If you do not have access to quality fresh seafood, buy precooked frozen shrimp or canned fish, such as tuna, salmon, or sardines.  Buy small amounts of raw or cooked vegetables, salads, or olives from the deli or salad bar at your store.  Stock your pantry so you always have certain foods on hand, such as olive oil, canned tuna, canned tomatoes, rice, pasta, and beans.  Cooking  Cook foods with extra-virgin olive oil instead of using butter or other vegetable oils.  Have meat as a side dish, and have vegetables or grains as your main dish. This means having meat in small portions or adding  small amounts of meat to foods like pasta or stew.  Use beans or vegetables instead of meat in common dishes like chili or lasagna.  [place] with different cooking methods. Try roasting or broiling vegetables instead of steaming or sautéeing them.  Add frozen vegetables to soups, stews, pasta, or rice.  Add nuts or seeds for added healthy fat at each meal. You can add these to yogurt, salads, or vegetable dishes.  Marinate fish or vegetables using olive oil, lemon juice, garlic, and fresh herbs.  Meal planning       Plan to eat 1 vegetarian meal one day each week. Try to work up to 2 vegetarian meals, if possible.  Eat seafood 2 or more times a week.  Have healthy snacks readily available, such as:  Vegetable sticks with hummus.  Greek yogurt.  Fruit and nut trail mix.  Eat balanced meals throughout the week. This includes:  Fruit: 2-3 servings a day  Vegetables: 4-5 servings a day  Low-fat dairy: 2 servings a day  Fish, poultry, or lean meat: 1 serving a day  Beans and legumes: 2 or more servings a week  Nuts and seeds: 1-2 servings a day  Whole grains: 6-8 servings a day  Extra-virgin olive oil: 3-4 servings a day  Limit red meat and sweets to only a few servings a month     What are my food choices?  Mediterranean diet  Recommended  Grains: Whole-grain pasta. Brown rice. Bulgar wheat. Polenta. Couscous. Whole-wheat bread. Oatmeal. Quinoa.  Vegetables: Artichokes. Beets. Broccoli. Cabbage. Carrots. Eggplant. Green beans. Chard. Kale. Spinach. Onions. Leeks. Peas. Squash. Tomatoes. Peppers. Radishes.  Fruits: Apples. Apricots. Avocado. Berries. Bananas. Cherries. Dates. Figs. Grapes. Minor. Melon. Oranges. Peaches. Plums. Pomegranate.  Meats and other protein foods: Beans. Almonds. Sunflower seeds. Pine nuts. Peanuts. Cod. [place]. Scallops. Shrimp. Tuna. Tilapia. Clams. Oysters. Eggs.  Dairy: Low-fat milk. Cheese. Greek yogurt.  Beverages: Water. Red wine. Herbal tea.  Fats and oils: Extra virgin olive oil.  Avocado oil. Grape seed oil.  Sweets and desserts: Greek yogurt with honey. Baked apples. Poached pears. Trail mix.  Seasoning and other foods: Basil. Cilantro. Coriander. Cumin. Mint. Parsley. Tony. Rosemary. Tarragon. Garlic. Oregano. Thyme. Pepper. Balsalmic vinegar. Tahini. Hummus. Tomato sauce. Olives. Mushrooms.  Limit these  Grains: Prepackaged pasta or rice dishes. Prepackaged cereal with added sugar.  Vegetables: Deep fried potatoes (french fries).  Fruits: Fruit canned in syrup.  Meats and other protein foods: Beef. Pork. Lamb. Poultry with skin. Hot dogs. Villegas.  Dairy: Ice cream. Sour cream. Whole milk.  Beverages: Juice. Sugar-sweetened soft drinks. Beer. Liquor and spirits.  Fats and oils: Butter. Canola oil. Vegetable oil. Beef fat (tallow). Lard.  Sweets and desserts: Cookies. Cakes. Pies. Candy.  Seasoning and other foods: Mayonnaise. Premade sauces and marinades.  The items listed may not be a complete list. Talk with your dietitian about what dietary choices are right for you.  Summary  The Mediterranean diet includes both food and lifestyle choices.  Eat a variety of fresh fruits and vegetables, beans, nuts, seeds, and whole grains.  Limit the amount of red meat and sweets that you eat.  Talk with your health care provider about whether it is safe for you to drink red wine in moderation. This means 1 glass a day for nonpregnant women and 2 glasses a day for men. A glass of wine equals 5 oz (150 mL).  This information is not intended to replace advice given to you by your health care provider. Make sure you discuss any questions you have with your health care provider.  Document Revised: 08/17/2017 Document Reviewed: 08/10/2017  Elsevier Patient Education © 2020 Elsevier Inc.         Exercising to Stay Healthy  To become healthy and stay healthy, it is recommended that you do moderate-intensity and vigorous-intensity exercise. You can tell that you are exercising at a moderate intensity if your  heart starts beating faster and you start breathing faster but can still hold a conversation. You can tell that you are exercising at a vigorous intensity if you are breathing much harder and faster and cannot hold a conversation while exercising.  Exercising regularly is important. It has many health benefits, such as:  Improving overall fitness, flexibility, and endurance.  Increasing bone density.  Helping with weight control.  Decreasing body fat.  Increasing muscle strength.  Reducing stress and tension.  Improving overall health.  How often should I exercise?  Choose an activity that you enjoy, and set realistic goals. Your health care provider can help you make an activity plan that works for you.  Exercise regularly as told by your health care provider. This may include:  Doing strength training two times a week, such as:  Lifting weights.  Using resistance bands.  Push-ups.  Sit-ups.  Yoga.  Doing a certain intensity of exercise for a given amount of time. Choose from these options:  A total of 150 minutes of moderate-intensity exercise every week.  A total of 75 minutes of vigorous-intensity exercise every week.  A mix of moderate-intensity and vigorous-intensity exercise every week.  Children, pregnant women, people who have not exercised regularly, people who are overweight, and older adults may need to talk with a health care provider about what activities are safe to do. If you have a medical condition, be sure to talk with your health care provider before you start a new exercise program.  What are some exercise ideas?    Moderate-intensity exercise ideas include:  Walking 1 mile (1.6 km) in about 15 minutes.  Biking.  Hiking.  Golfing.  Dancing.  Water aerobics.  Vigorous-intensity exercise ideas include:  Walking 4.5 miles (7.2 km) or more in about 1 hour.  Jogging or running 5 miles (8 km) in about 1 hour.  Biking 10 miles (16.1 km) or more in about 1 hour.  Lap swimming.  Roller-skating or in-line  skating.  Cross-country skiing.  Vigorous competitive sports, such as football, basketball, and soccer.  Jumping rope.  Aerobic dancing.  What are some everyday activities that can help me to get exercise?  Yard work, such as:  Pushing a .  Raking and bagging leaves.  Washing your car.  Pushing a stroller.  Shoveling snow.  Gardening.  Washing windows or floors.  How can I be more active in my day-to-day activities?  Use stairs instead of an elevator.  Take a walk during your lunch break.  If you drive, park your car farther away from your work or school.  If you take public transportation, get off one stop early and walk the rest of the way.  Stand up or walk around during all of your indoor phone calls.  Get up, stretch, and walk around every 30 minutes throughout the day.  Enjoy exercise with a friend. Support to continue exercising will help you keep a regular routine of activity.  What guidelines can I follow while exercising?  Before you start a new exercise program, talk with your health care provider.  Do not exercise so much that you hurt yourself, feel dizzy, or get very short of breath.  Wear comfortable clothes and wear shoes with good support.  Drink plenty of water while you exercise to prevent dehydration or heat stroke.  Work out until your breathing and your heartbeat get faster.  Where to find more information  [place] Department of Health and Human Services: www.hhs.gov  Centers for Disease Control and Prevention (CDC): www.cdc.gov  Summary  Exercising regularly is important. It will improve your overall fitness, flexibility, and endurance.  Regular exercise also will improve your overall health. It can help you control your weight, reduce stress, and improve your bone density.  Do not exercise so much that you hurt yourself, feel dizzy, or get very short of breath.  Before you start a new exercise program, talk with your health care provider.  This information is not intended to replace  advice given to you by your health care provider. Make sure you discuss any questions you have with your health care provider.  Document Revised: 11/30/2018 Document Reviewed: 11/08/2018  Elsevier Patient Education © 2021 [employer]           Mindfulness-Based Stress Reduction  Mindfulness-based stress reduction (MBSR) is a program that helps people learn to practice mindfulness. Mindfulness is the practice of intentionally paying attention to the present moment. It can be learned and practiced through techniques such as education, breathing exercises, meditation, and yoga. MBSR includes several mindfulness techniques in one program.  MBSR works best when you understand the treatment, are willing to try new things, and can commit to spending time practicing what you learn. MBSR training may include learning about:  How your emotions, thoughts, and reactions affect your body.  New ways to respond to things that cause negative thoughts to start (triggers).  How to notice your thoughts and let go of them.  Practicing awareness of everyday things that you normally do without thinking.  The techniques and goals of different types of meditation.  What are the benefits of MBSR?  MBSR can have many benefits, which include helping you to:  Develop self-awareness. This refers to knowing and understanding yourself.  Learn skills and attitudes that help you to participate in your own health care.  Learn new ways to care for yourself.  Be more accepting about how things are, and let things go.  Be less judgmental and approach things with an open mind.  Be patient with yourself and trust yourself more.  MBSR has also been shown to:  Reduce negative emotions, such as depression and anxiety.  Improve memory and focus.  Change how you sense and approach pain.  Boost your body's ability to fight infections.  Help you connect better with other people.  Improve your sense of well-being.  Follow these instructions at home:       Find  a local in-person or online MBSR program.  Set aside some time regularly for mindfulness practice.  Find a mindfulness practice that works best for you. This may include one or more of the following:  Meditation. Meditation involves focusing your mind on a certain thought or activity.  Breathing awareness exercises. These help you to stay present by focusing on your breath.  Body scan. For this practice, you lie down and pay attention to each part of your body from head to toe. You can identify tension and soreness and intentionally relax parts of your body.  Yoga. Yoga involves stretching and breathing, and it can improve your ability to move and be flexible. It can also provide an experience of testing your body's limits, which can help you release stress.  Mindful eating. This way of eating involves focusing on the taste, texture, color, and smell of each bite of food. Because this slows down eating and helps you feel full sooner, it can be an important part of a weight-loss plan.  Find a podcast or recording that provides guidance for breathing awareness, body scan, or meditation exercises. You can listen to these any time when you have a free moment to rest without distractions.  Follow your treatment plan as told by your health care provider. This may include taking regular medicines and making changes to your diet or lifestyle as recommended.  How to practice mindfulness  To do a basic awareness exercise:  Find a comfortable place to sit.  Pay attention to the present moment. Observe your thoughts, feelings, and surroundings just as they are.  Avoid placing judgment on yourself, your feelings, or your surroundings. Make note of any judgment that comes up, and let it go.  Your mind may wander, and that is okay. Make note of when your thoughts drift, and return your attention to the present moment.  To do basic mindfulness meditation:  Find a comfortable place to sit. This may include a stable chair or a firm  floor cushion.  Sit upright with your back straight. Let your arms fall next to your side with your hands resting on your legs.  If sitting in a chair, rest your feet flat on the floor.  If sitting on a cushion, cross your legs in front of you.  Keep your head in a neutral position with your chin dropped slightly. Relax your jaw and rest the tip of your tongue on the roof of your mouth. Drop your gaze to the floor. You can close your eyes if you like.  Breathe normally and pay attention to your breath. Feel the air moving in and out of your nose. Feel your belly expanding and relaxing with each breath.  Your mind may wander, and that is okay. Make note of when your thoughts drift, and return your attention to your breath.  Avoid placing judgment on yourself, your feelings, or your surroundings. Make note of any judgment or feelings that come up, let them go, and bring your attention back to your breath.  When you are ready, lift your gaze or open your eyes. Pay attention to how your body feels after the meditation.  Where to find more information  You can find more information about MBSR from:  Your health care provider.  Community-based meditation centers or programs.  Programs offered near you.  Summary  Mindfulness-based stress reduction (MBSR) is a program that teaches you how to intentionally pay attention to the present moment. It is used with other treatments to help you cope better with daily stress, emotions, and pain.  MBSR focuses on developing self-awareness, which allows you to respond to life stress without judgment or negative emotions.  MBSR programs may involve learning different mindfulness practices, such as breathing exercises, meditation, yoga, body scan, or mindful eating. Find a mindfulness practice that works best for you, and set aside time for it on a regular basis.  This information is not intended to replace advice given to you by your health care provider. Make sure you discuss any  questions you have with your health care provider.  Document Revised: 02/22/2021 Document Reviewed: 04/26/2018  Elsevier Patient Education © 2021 Elsevier Inc.

## 2022-06-27 ENCOUNTER — OFFICE VISIT (OUTPATIENT)
Dept: INTERNAL MEDICINE | Facility: CLINIC | Age: 42
End: 2022-06-27

## 2022-06-27 VITALS
HEART RATE: 64 BPM | TEMPERATURE: 97.3 F | DIASTOLIC BLOOD PRESSURE: 67 MMHG | BODY MASS INDEX: 30.35 KG/M2 | SYSTOLIC BLOOD PRESSURE: 103 MMHG | WEIGHT: 193.4 LBS | OXYGEN SATURATION: 97 % | HEIGHT: 67 IN

## 2022-06-27 DIAGNOSIS — Z76.89 ENCOUNTER TO ESTABLISH CARE WITH NEW DOCTOR: Primary | ICD-10-CM

## 2022-06-27 DIAGNOSIS — Z11.59 NEED FOR HEPATITIS C SCREENING TEST: ICD-10-CM

## 2022-06-27 DIAGNOSIS — Z13.220 SCREENING FOR LIPID DISORDERS: ICD-10-CM

## 2022-06-27 DIAGNOSIS — E03.8 OTHER SPECIFIED HYPOTHYROIDISM: ICD-10-CM

## 2022-06-27 DIAGNOSIS — R63.1 POLYDIPSIA: ICD-10-CM

## 2022-06-27 DIAGNOSIS — G93.0 ARACHNOID CYST: ICD-10-CM

## 2022-06-27 DIAGNOSIS — E66.09 CLASS 1 OBESITY DUE TO EXCESS CALORIES WITHOUT SERIOUS COMORBIDITY WITH BODY MASS INDEX (BMI) OF 30.0 TO 30.9 IN ADULT: ICD-10-CM

## 2022-06-27 DIAGNOSIS — G40.909 SEIZURE DISORDER: ICD-10-CM

## 2022-06-27 DIAGNOSIS — E87.1 HYPONATREMIA: ICD-10-CM

## 2022-06-27 DIAGNOSIS — F70 MILD INTELLECTUAL DISABILITY: ICD-10-CM

## 2022-06-27 LAB
ACANTHOCYTES BLD QL SMEAR: ABNORMAL
ALBUMIN SERPL-MCNC: 4.2 G/DL (ref 3.5–5.2)
ALBUMIN/GLOB SERPL: 1.5 G/DL
ALP SERPL-CCNC: 60 U/L (ref 39–117)
ALT SERPL W P-5'-P-CCNC: 11 U/L (ref 1–41)
ANION GAP SERPL CALCULATED.3IONS-SCNC: 7.6 MMOL/L (ref 5–15)
AST SERPL-CCNC: 16 U/L (ref 1–40)
BILIRUB SERPL-MCNC: 0.4 MG/DL (ref 0–1.2)
BUN SERPL-MCNC: 11 MG/DL (ref 6–20)
BUN/CREAT SERPL: 17.5 (ref 7–25)
CALCIUM SPEC-SCNC: 8.9 MG/DL (ref 8.6–10.5)
CHLORIDE SERPL-SCNC: 92 MMOL/L (ref 98–107)
CHOLEST SERPL-MCNC: 135 MG/DL (ref 0–200)
CO2 SERPL-SCNC: 25.4 MMOL/L (ref 22–29)
CREAT SERPL-MCNC: 0.63 MG/DL (ref 0.76–1.27)
DEPRECATED RDW RBC AUTO: 43.1 FL (ref 37–54)
EGFRCR SERPLBLD CKD-EPI 2021: 122.6 ML/MIN/1.73
EOSINOPHIL # BLD MANUAL: 0.05 10*3/MM3 (ref 0–0.4)
EOSINOPHIL NFR BLD MANUAL: 1 % (ref 0.3–6.2)
ERYTHROCYTE [DISTWIDTH] IN BLOOD BY AUTOMATED COUNT: 13.4 % (ref 12.3–15.4)
GLOBULIN UR ELPH-MCNC: 2.8 GM/DL
GLUCOSE SERPL-MCNC: 77 MG/DL (ref 65–99)
HCT VFR BLD AUTO: 42.5 % (ref 37.5–51)
HDLC SERPL-MCNC: 40 MG/DL (ref 40–60)
HGB BLD-MCNC: 14.3 G/DL (ref 13–17.7)
LDLC SERPL CALC-MCNC: 72 MG/DL (ref 0–100)
LDLC/HDLC SERPL: 1.73 {RATIO}
LYMPHOCYTES # BLD MANUAL: 2.91 10*3/MM3 (ref 0.7–3.1)
LYMPHOCYTES NFR BLD MANUAL: 5.2 % (ref 5–12)
MCH RBC QN AUTO: 29.8 PG (ref 26.6–33)
MCHC RBC AUTO-ENTMCNC: 33.6 G/DL (ref 31.5–35.7)
MCV RBC AUTO: 88.5 FL (ref 79–97)
MONOCYTES # BLD: 0.26 10*3/MM3 (ref 0.1–0.9)
NEUTROPHILS # BLD AUTO: 1.82 10*3/MM3 (ref 1.7–7)
NEUTROPHILS NFR BLD MANUAL: 36.1 % (ref 42.7–76)
NRBC BLD AUTO-RTO: 0 /100 WBC (ref 0–0.2)
PLAT MORPH BLD: NORMAL
PLATELET # BLD AUTO: 199 10*3/MM3 (ref 140–450)
PMV BLD AUTO: 11.3 FL (ref 6–12)
POIKILOCYTOSIS BLD QL SMEAR: ABNORMAL
POTASSIUM SERPL-SCNC: 4.8 MMOL/L (ref 3.5–5.2)
PROT SERPL-MCNC: 7 G/DL (ref 6–8.5)
RBC # BLD AUTO: 4.8 10*6/MM3 (ref 4.14–5.8)
SODIUM SERPL-SCNC: 125 MMOL/L (ref 136–145)
TRIGL SERPL-MCNC: 130 MG/DL (ref 0–150)
TSH SERPL DL<=0.05 MIU/L-ACNC: 2.98 UIU/ML (ref 0.27–4.2)
VALPROATE SERPL-MCNC: 88 MCG/ML (ref 50–125)
VARIANT LYMPHS NFR BLD MANUAL: 57.7 % (ref 19.6–45.3)
VLDLC SERPL-MCNC: 23 MG/DL (ref 5–40)
WBC MORPH BLD: NORMAL
WBC NRBC COR # BLD: 5.05 10*3/MM3 (ref 3.4–10.8)

## 2022-06-27 PROCEDURE — 80061 LIPID PANEL: CPT | Performed by: STUDENT IN AN ORGANIZED HEALTH CARE EDUCATION/TRAINING PROGRAM

## 2022-06-27 PROCEDURE — 84443 ASSAY THYROID STIM HORMONE: CPT | Performed by: STUDENT IN AN ORGANIZED HEALTH CARE EDUCATION/TRAINING PROGRAM

## 2022-06-27 PROCEDURE — 85007 BL SMEAR W/DIFF WBC COUNT: CPT | Performed by: STUDENT IN AN ORGANIZED HEALTH CARE EDUCATION/TRAINING PROGRAM

## 2022-06-27 PROCEDURE — 85025 COMPLETE CBC W/AUTO DIFF WBC: CPT | Performed by: STUDENT IN AN ORGANIZED HEALTH CARE EDUCATION/TRAINING PROGRAM

## 2022-06-27 PROCEDURE — 99204 OFFICE O/P NEW MOD 45 MIN: CPT | Performed by: STUDENT IN AN ORGANIZED HEALTH CARE EDUCATION/TRAINING PROGRAM

## 2022-06-27 PROCEDURE — 86803 HEPATITIS C AB TEST: CPT | Performed by: STUDENT IN AN ORGANIZED HEALTH CARE EDUCATION/TRAINING PROGRAM

## 2022-06-27 PROCEDURE — 80053 COMPREHEN METABOLIC PANEL: CPT | Performed by: STUDENT IN AN ORGANIZED HEALTH CARE EDUCATION/TRAINING PROGRAM

## 2022-06-27 PROCEDURE — 80164 ASSAY DIPROPYLACETIC ACD TOT: CPT | Performed by: STUDENT IN AN ORGANIZED HEALTH CARE EDUCATION/TRAINING PROGRAM

## 2022-06-27 NOTE — PROGRESS NOTES
"Chief Complaint  Establish Care ([facility] resident/ possibly up calorie intake ) and Hypothyroidism    Subjective          Jaleel Garcia presents to [facility]  History of Present Illness    Previous PCP: Dr Nicolás Chatterjee in [place]  Specialist(s): Kris (neuro)  COVID vaccine: pfizer    Here with representative from [facility].  Has been there since October 2021.    41 yo M with a history of intellectual disability, seizure disorder (well controlled), hypothyroidism, obesity who is here to establish care.    Last ED visit for seizures was 12/26/2021.  Per [facility] representative, no interim seizures.    Takes levothyroxine in morning at breakfast time.    Current Outpatient Medications   Medication Instructions   • Calcium Carbonate-Vitamin D (calcium-vitamin D) 500-200 MG-UNIT tablet per tablet 1 tablet, Oral, Daily   • cetirizine (ZYRTEC) 10 mg, Oral, Daily   • desmopressin (DDAVP) 0.2 mg, Oral, 2 Times Daily   • divalproex (DEPAKOTE ER) 1,000 mg, Oral, Daily   • levETIRAcetam (KEPPRA) 750 mg, Oral, 2 Times Daily   • levothyroxine (SYNTHROID, LEVOTHROID) 50 mcg, Oral, Every Early Morning   • sertraline (ZOLOFT) 150 mg, Oral, Daily       The following portions of the patient's history were reviewed and updated as appropriate: allergies, current medications, past family history, past medical history, past social history, past surgical history, and problem list.    Objective   Vital Signs:   /67   Pulse 64   Temp 97.3 °F (36.3 °C) (Temporal)   Ht 170.2 cm (67\")   Wt 87.7 kg (193 lb 6.4 oz)   SpO2 97%   BMI 30.29 kg/m²     Wt Readings from Last 3 Encounters:   06/27/22 87.7 kg (193 lb 6.4 oz)   01/31/22 89.4 kg (197 lb)   12/26/21 92.2 kg (203 lb 4.2 oz)     BP Readings from Last 3 Encounters:   06/27/22 103/67   01/31/22 120/66   12/28/21 118/71     Physical Exam  Vitals reviewed.   Constitutional:       General: He is not in acute distress.     " Appearance: Normal appearance. He is not toxic-appearing.   HENT:      Head: Normocephalic.        Comments: Well healed scar noted right side of cranium     Mouth/Throat:      Mouth: Mucous membranes are moist.      Pharynx: Oropharynx is clear.      Comments: Poor dentition  Eyes:      Conjunctiva/sclera: Conjunctivae normal.   Cardiovascular:      Rate and Rhythm: Normal rate and regular rhythm.      Pulses: Normal pulses.      Heart sounds: Normal heart sounds. No murmur heard.  Pulmonary:      Effort: Pulmonary effort is normal.      Breath sounds: Normal breath sounds. No stridor. No wheezing.   Abdominal:      General: Abdomen is flat.      Palpations: Abdomen is soft. There is no mass.      Tenderness: There is no abdominal tenderness.   Musculoskeletal:      Right lower leg: No edema.      Left lower leg: No edema.   Skin:     General: Skin is warm and dry.   Neurological:      General: No focal deficit present.      Mental Status: He is alert. Mental status is at baseline.   Psychiatric:         Mood and Affect: Mood normal.       Result Review :   The following data was reviewed by: Rico Monzon MD on 06/27/2022:  Common labs    Common Labsle 12/26/21 12/26/21 12/27/21 12/27/21 12/28/21 12/28/21    1829 1829 0533 [id number]   Glucose  104 (A)  86  89   BUN  13  10  12   Creatinine  1.13  0.80  0.66 (A)   eGFR Non African Am  72  107  134   Sodium  137  138  133 (A)   Potassium  3.9  4.1  3.9   Chloride  97 (A)  105  101   Calcium  9.5  8.7  8.8   Albumin  4.60    3.50   Total Bilirubin  0.3    0.8   Alkaline Phosphatase  74    46   AST (SGOT)  23    31   ALT (SGPT)  23    17   WBC 12.84 (A)  9.45  7.64    Hemoglobin 16.2  14.1  13.9    Hematocrit 48.0  40.7  40.7    Platelets 253  155  152    (A) Abnormal value       Comments are available for some flowsheets but are not being displayed.                  Lab Results   Component Value Date    COVID19 NOT DETECTED 06/04/2020    INR 1.10 (L)  12/26/2021    BILIRUBINUR Negative 12/26/2021       Procedures        Assessment and Plan    Diagnoses and all orders for this visit:    1. Encounter to establish care with new doctor (Primary)    2. Seizure disorder (HCC)  -     Valproic Acid Level, Total  -     CBC & Differential  -     Comprehensive Metabolic Panel    3. Mild intellectual disability    4. Other specified hypothyroidism  -     TSH    5. Arachnoid cyst  -     TSH    6. Hyponatremia  -     Comprehensive Metabolic Panel    7. Screening for lipid disorders  -     Lipid Panel    8. Need for hepatitis C screening test  -     Hepatitis C Antibody    9. Class 1 obesity due to excess calories without serious comorbidity with body mass index (BMI) of 30.0 to 30.9 in adult    10. Polydipsia    11. Other specified hypothyroidism   -     TSH      Seizure disorder:  -on depakote and keppra  -no seizures since 12/26/2021 ED visit  -will check level  -follows with neuro    TSH:  -provided instructions to [facility] that Mr. Garcia is to take levothyroxine on an empty stomach at least a half hour before meal.    Obesity:  -noted, based on documentation provided today by [facility] this is improved (from BMI 40 to 30 today).    Misc:  -I have asked  to request records from previous PCP    There are no discontinued medications.       Follow Up   Return in about 3 months (around 9/27/2022) for Medicare Wellness.  Patient was given instructions and counseling regarding his condition or for health maintenance advice. Please see specific information pulled into the AVS if appropriate.       Rico Monzon MD  06/27/22  14:32 EDT            Shingles vaccine: na  Colon cancer screening: na

## 2022-06-28 ENCOUNTER — TELEPHONE (OUTPATIENT)
Dept: INTERNAL MEDICINE | Facility: CLINIC | Age: 42
End: 2022-06-28

## 2022-06-28 DIAGNOSIS — E87.1 HYPONATREMIA: Primary | ICD-10-CM

## 2022-06-28 LAB — HCV AB SER DONR QL: NORMAL

## 2022-06-28 NOTE — TELEPHONE ENCOUNTER
ATTEMPTED TO CONTACT PT PER PROVIDER'S INSTRUCTIONS     NO ANSWER     LEFT VOICEMAIL WITH INSTRUCTIONS TO RETURN CALL TO OFFICE AT [phone]    OK FOR HUB TO ADVISE/READ   Rico Monzon MD   6/28/2022 10:22 AM EDT Back to Top      CMP notable for sodium of 125, which is a substantial worsening compared to 6 months ago.  If this continues to worsen, among other things it could provoke further seizures.     I would recommend that he consume now more than 800 ml (27 ounces) of fluids daily.  I would like to re-check his sodium in one week at our [facility] outpatient lab (on either July 5th or July 6th).     Valproic acid level is at goal.     TSH (a thyroid test) is at goal.     Lipids are within normal limits.     CBC shows no significant abnormalities of red blood cells, white blood cells or platelets.     Hepatitis C screen is negative.

## 2022-06-28 NOTE — TELEPHONE ENCOUNTER
PT(PATIENT) CARETAKER VERIFIED     CONTACTED PT(PATIENT) CARETAKER PER PROVIDER'S INSTRUCTIONS    [facility] NEEDS AN EXPLANATION OF THE FLUID RESTRICTION     I would recommend that he consume now more than 800 ml (27 ounces) of fluids daily.  I would like to re-check his sodium in one week at our [facility] outpatient lab (on either  or ).    STAFF STATES PT(PATIENT) IS CURRENTLY DRINKING 72 OUNCES DAILY     [facility] STATES THEY DID RECEIVE A PLAN OF CARE WITH A STATEMENT FOR 72 OUNCES OF FLUID PER DAY    [facility] OF [facility] RESULTS     [facility] FAX # [phone]   ATTJUAN LEMA ([employer])    PROVIDER PLEASE ADVISE

## 2022-06-28 NOTE — TELEPHONE ENCOUNTER
Caller: KARINA GARZA     Relationship to patient: WITH [facility] STAFF     HUB RELAYED MESSAGE.  VERBAL UNDERSTANDING WAS EXPRESSED BY KARINA GARZA WITH [facility] STAFF THAT IS ON  VERBAL

## 2022-06-28 NOTE — PROGRESS NOTES
CMP notable for sodium of 125, which is a substantial worsening compared to 6 months ago.  If this continues to worsen, among other things it could provoke further seizures.    I would recommend that he consume now more than 800 ml (27 ounces) of fluids daily.  I would like to re-check his sodium in one week at our [facility] outpatient lab (on either July 5th or July 6th).    Valproic acid level is at goal.    TSH (a thyroid test) is at goal.    Lipids are within normal limits.    CBC shows no significant abnormalities of red blood cells, white blood cells or platelets.    Hepatitis C screen is negative.

## 2022-06-28 NOTE — TELEPHONE ENCOUNTER
----- Message from Rico Monzon MD sent at 6/28/2022 10:22 AM EDT -----  CMP notable for sodium of 125, which is a substantial worsening compared to 6 months ago.  If this continues to worsen, among other things it could provoke further seizures.    I would recommend that he consume now more than 800 ml (27 ounces) of fluids daily.  I would like to re-check his sodium in one week at our [facility] outpatient lab (on either July 5th or July 6th).    Valproic acid level is at goal.    TSH (a thyroid test) is at goal.    Lipids are within normal limits.    CBC shows no significant abnormalities of red blood cells, white blood cells or platelets.    Hepatitis C screen is negative.

## 2022-06-29 NOTE — TELEPHONE ENCOUNTER
That's an excellent.  Question.  His serum sodium is alarmingly low, and I'm worried that it will worsen over time if we don't make a change.  This could cause him to have seizures, and it threatens his life.    A strict fluid restriction is one way to correct this.  I would recommend less than 800 ml of fluids daily.  I would also like to recheck his sodium in about one week at our [facility] lab.

## 2022-06-29 NOTE — TELEPHONE ENCOUNTER
HOME HEALTH VERIFIED     CONTACTED HOME HEALTH PER PROVIDER'S INSTRUCTIONS    HOME HEALTH STATES THEY NEED AN ACTUAL ORDER ON SCRIPT PAD THAT THIS NEEDS TO BE CHANGES FROM THE 72 OUNCES FLUID TO 27 OUNCES FLUID ALONG WITH THE REASON WHY    PROVIDER PLEASE ADVISE

## 2022-07-05 ENCOUNTER — LAB (OUTPATIENT)
Dept: LAB | Facility: HOSPITAL | Age: 42
End: 2022-07-05

## 2022-07-05 DIAGNOSIS — E87.1 HYPONATREMIA: ICD-10-CM

## 2022-07-05 LAB
ANION GAP SERPL CALCULATED.3IONS-SCNC: 10.2 MMOL/L (ref 5–15)
BUN SERPL-MCNC: 19 MG/DL (ref 6–20)
BUN/CREAT SERPL: 30.2 (ref 7–25)
CALCIUM SPEC-SCNC: 9.2 MG/DL (ref 8.6–10.5)
CHLORIDE SERPL-SCNC: 104 MMOL/L (ref 98–107)
CO2 SERPL-SCNC: 24.8 MMOL/L (ref 22–29)
CREAT SERPL-MCNC: 0.63 MG/DL (ref 0.76–1.27)
EGFRCR SERPLBLD CKD-EPI 2021: 122.6 ML/MIN/1.73
GLUCOSE SERPL-MCNC: 78 MG/DL (ref 65–99)
POTASSIUM SERPL-SCNC: 4.4 MMOL/L (ref 3.5–5.2)
SODIUM SERPL-SCNC: 139 MMOL/L (ref 136–145)

## 2022-07-05 PROCEDURE — 36415 COLL VENOUS BLD VENIPUNCTURE: CPT

## 2022-07-05 PROCEDURE — 80048 BASIC METABOLIC PNL TOTAL CA: CPT

## 2022-07-06 ENCOUNTER — TELEPHONE (OUTPATIENT)
Dept: INTERNAL MEDICINE | Facility: CLINIC | Age: 42
End: 2022-07-06

## 2022-07-06 NOTE — TELEPHONE ENCOUNTER
----- Message from Rico Monzon MD sent at 7/6/2022  6:40 AM EDT -----  Serum sodium is now within normal limits.  We will continue fluid restriction for now.

## 2022-07-06 NOTE — TELEPHONE ENCOUNTER
PT(PATIENT) VERIFIED     CONTACTED PT(PATIENT) PER PROVIDER'S INSTRUCTIONS    [facility] WOULD LIKE TO KNOW HOW MUCH LONGER PT(PATIENT) WILL BE ON RESTRICTION, AS WELL AS ANY FOLLOW UP LABS     PROVIDER PLEASE ADVISE

## 2022-07-08 NOTE — TELEPHONE ENCOUNTER
HOME HEALTH VERIFIED     CONTACTED HOME HEALTH PER PROVIDER'S INSTRUCTIONS    [facility] WILL NEED AN ORDER THAT WILL STATE TO CONTINUE FLUID RESTRICTION AT CURRENT LEVEL UNTIL SEPT APPOINTMENT    PLEASE FAX ORDER  [phone]

## 2022-07-08 NOTE — TELEPHONE ENCOUNTER
I'd like to continue this level of fluid restriction until his September appointment, and we can discuss adjusting it.  The previous fluid restriction parameters weren't working, and it was dangerous for him.

## 2022-07-11 NOTE — TELEPHONE ENCOUNTER
Written prescription is in my scan folder.  Please scan into his record, and fax to [facility].  Thank you.

## 2022-07-25 PROBLEM — R63.1 POLYDIPSIA: Status: ACTIVE | Noted: 2022-07-25

## 2022-07-25 PROBLEM — J30.9 ALLERGIC RHINITIS, UNSPECIFIED: Status: ACTIVE | Noted: 2022-07-25

## 2022-07-25 PROBLEM — R32 INCONTINENCE: Status: ACTIVE | Noted: 2022-07-25

## 2022-07-25 PROBLEM — F79 INTELLECTUAL DISABILITY: Status: ACTIVE | Noted: 2022-07-25

## 2022-07-25 PROBLEM — L72.0 EPIDERMAL CYST: Status: ACTIVE | Noted: 2022-07-25

## 2022-07-25 PROBLEM — F33.42 MAJOR DEPRESSIVE DISORDER, RECURRENT, IN FULL REMISSION: Status: ACTIVE | Noted: 2022-07-25

## 2022-09-07 ENCOUNTER — TELEPHONE (OUTPATIENT)
Dept: INTERNAL MEDICINE | Facility: CLINIC | Age: 42
End: 2022-09-07

## 2022-09-07 NOTE — TELEPHONE ENCOUNTER
Caller: KARINA    Relationship: Other    Best call back number: [phone]    What form or medical record are you requesting:   DOCUMENTATION REGARDING THE FLUID RESTRICTION    Who is requesting this form or medical record from you: [facility]    How would you like to receive the form or medical records (pick-up, mail, fax):  If fax, what is the fax number: [phone]      Timeframe paperwork needed: AS SOON AS POSSIBLE    Additional notes:  [facility] NEVER RECEIVED INFORMATION AS TO HOW LONG THE RESTRICTION WAS TO BE CONTINUED

## 2022-09-07 NOTE — TELEPHONE ENCOUNTER
Caller: KARINA GARZA [facility]     Relationship to patient:  AT [facility]     Best call back number: [phone]    Chief complaint: NEEDS TO DISCUSS FLUID RESTRICTION     Type of visit: OFFICE VISIT     Requested date:TOMORROW 09/08/2022 AROUND 9:30 AM TO 10:00 AM IF AT ALL POSSIBLE               [facility] STAFF IS ON A BH VERBAL FOR THIS OFFICE. THANKS.

## 2022-09-08 ENCOUNTER — OFFICE VISIT (OUTPATIENT)
Dept: INTERNAL MEDICINE | Facility: CLINIC | Age: 42
End: 2022-09-08

## 2022-09-08 VITALS
HEIGHT: 67 IN | OXYGEN SATURATION: 97 % | WEIGHT: 182.2 LBS | SYSTOLIC BLOOD PRESSURE: 99 MMHG | TEMPERATURE: 97.4 F | DIASTOLIC BLOOD PRESSURE: 61 MMHG | HEART RATE: 56 BPM | BODY MASS INDEX: 28.6 KG/M2

## 2022-09-08 DIAGNOSIS — R63.1 POLYDIPSIA: ICD-10-CM

## 2022-09-08 DIAGNOSIS — E87.1 HYPONATREMIA: Primary | ICD-10-CM

## 2022-09-08 DIAGNOSIS — G40.909 SEIZURE DISORDER: ICD-10-CM

## 2022-09-08 DIAGNOSIS — F70 MILD INTELLECTUAL DISABILITY: ICD-10-CM

## 2022-09-08 PROCEDURE — 99214 OFFICE O/P EST MOD 30 MIN: CPT | Performed by: STUDENT IN AN ORGANIZED HEALTH CARE EDUCATION/TRAINING PROGRAM

## 2022-09-08 PROCEDURE — 80048 BASIC METABOLIC PNL TOTAL CA: CPT | Performed by: STUDENT IN AN ORGANIZED HEALTH CARE EDUCATION/TRAINING PROGRAM

## 2022-09-08 NOTE — PROGRESS NOTES
"Chief Complaint  Follow-up (Discuss fluid restrictions)    Subjective          Jaleel Garcia presents to [facility]  History of Present Illness    Here with Alicia from [facility].    Here today due to concerns that fluid restriction is overly restrictive.  Currently on 27 oz (800 ml) daily fluid restriction.  He has been well with no interim complaints and no seizures.      Current Outpatient Medications   Medication Instructions   • Calcium Carbonate-Vitamin D (calcium-vitamin D) 500-200 MG-UNIT tablet per tablet 1 tablet, Oral, Daily   • cetirizine (ZYRTEC) 10 mg, Oral, Daily   • desmopressin (DDAVP) 0.2 mg, Oral, 2 Times Daily   • divalproex (DEPAKOTE ER) 1,000 mg, Oral, Daily   • levETIRAcetam (KEPPRA) 750 mg, Oral, 2 Times Daily   • levothyroxine (SYNTHROID, LEVOTHROID) 50 mcg, Oral, Every Early Morning   • sertraline (ZOLOFT) 150 mg, Oral, Daily       The following portions of the patient's history were reviewed and updated as appropriate: allergies, current medications, past family history, past medical history, past social history, past surgical history, and problem list.    Objective   Vital Signs:   BP 99/61   Pulse 56   Temp 97.4 °F (36.3 °C) (Temporal)   Ht 170.2 cm (67\")   Wt 82.6 kg (182 lb 3.2 oz)   SpO2 97%   BMI 28.54 kg/m²     Wt Readings from Last 3 Encounters:   09/08/22 82.6 kg (182 lb 3.2 oz)   06/27/22 87.7 kg (193 lb 6.4 oz)   01/31/22 89.4 kg (197 lb)     BP Readings from Last 3 Encounters:   09/08/22 99/61   06/27/22 103/67   01/31/22 120/66     Physical Exam  Vitals reviewed.   Constitutional:       Appearance: Normal appearance.   HENT:      Head: Normocephalic and atraumatic.   Eyes:      Conjunctiva/sclera: Conjunctivae normal.   Cardiovascular:      Rate and Rhythm: Normal rate.      Pulses: Normal pulses.      Heart sounds: Normal heart sounds. No murmur heard.  Pulmonary:      Effort: Pulmonary effort is normal.      Breath " sounds: Normal breath sounds.   Abdominal:      General: Abdomen is flat.      Palpations: There is no mass.      Tenderness: There is no abdominal tenderness.   Musculoskeletal:      Right lower leg: No edema.      Left lower leg: No edema.   Skin:     General: Skin is warm and dry.   Neurological:      General: No focal deficit present.      Mental Status: He is alert.         Result Review :   The following data was reviewed by: Rico Monzon MD on 09/08/2022:  Common labs    Common Labsle 12/28/21 12/28/21 6/27/22 6/27/22 6/27/22 7/5/22    0356 0534 1620 1620 1620    Glucose  89   77 78   BUN  12   11 19   Creatinine  0.66 (A)   0.63 (A) 0.63 (A)   eGFR Non African Am  134       Sodium  133 (A)   125 (A) 139   Potassium  3.9   4.8 4.4   Chloride  101   92 (A) 104   Calcium  8.8   8.9 9.2   Albumin  3.50   4.20    Total Bilirubin  0.8   0.4    Alkaline Phosphatase  46   60    AST (SGOT)  31   16    ALT (SGPT)  17   11    WBC 7.64  5.05      Hemoglobin 13.9  14.3      Hematocrit 40.7  42.5      Platelets 152  199      Total Cholesterol    135     Triglycerides    130     HDL Cholesterol    40     LDL Cholesterol     72     (A) Abnormal value                   Lab Results   Component Value Date    COVID19 NOT DETECTED 06/04/2020    INR 1.10 (L) 12/26/2021    BILIRUBINUR Negative 12/26/2021       Procedures        Assessment and Plan    Diagnoses and all orders for this visit:    1. Hyponatremia (Primary)  -     Basic metabolic panel    2. Seizure disorder (HCC)  Comments:  -stable, with no seizures in interim    3. Mild intellectual disability    4. Polydipsia  -     Basic metabolic panel      Hyponatremia:  -sodium normalized on 800 ml fluid restriction  -will cautiously trial new fluid restriction of 1,500 ml (50 oz) daily  -BMP today, and will re-check sodium at upcoming wellness visit  -I presented [facility] with paperwork documenting fluid restriction as requested    There are no discontinued medications.      I spent 30 minutes caring for Jaleel on this date of service. This time includes time spent by me in the following activities:preparing for the visit, reviewing tests, obtaining and/or reviewing a separately obtained history, performing a medically appropriate examination and/or evaluation , counseling and educating the patient/family/caregiver, ordering medications, tests, or procedures and documenting information in the medical record  Follow Up   Return in about 22 days (around 9/30/2022) for Medicare Wellness.  Patient was given instructions and counseling regarding his condition or for health maintenance advice. Please see specific information pulled into the AVS if appropriate.       Rico Monzon MD  09/08/22  10:56 EDT

## 2022-09-09 ENCOUNTER — TELEPHONE (OUTPATIENT)
Dept: INTERNAL MEDICINE | Facility: CLINIC | Age: 42
End: 2022-09-09

## 2022-09-09 LAB
ANION GAP SERPL CALCULATED.3IONS-SCNC: 10.2 MMOL/L (ref 5–15)
BUN SERPL-MCNC: 13 MG/DL (ref 6–20)
BUN/CREAT SERPL: 22.4 (ref 7–25)
CALCIUM SPEC-SCNC: 9.2 MG/DL (ref 8.6–10.5)
CHLORIDE SERPL-SCNC: 98 MMOL/L (ref 98–107)
CO2 SERPL-SCNC: 26.8 MMOL/L (ref 22–29)
CREAT SERPL-MCNC: 0.58 MG/DL (ref 0.76–1.27)
EGFRCR SERPLBLD CKD-EPI 2021: 125.7 ML/MIN/1.73
GLUCOSE SERPL-MCNC: 71 MG/DL (ref 65–99)
POTASSIUM SERPL-SCNC: 4.9 MMOL/L (ref 3.5–5.2)
SODIUM SERPL-SCNC: 135 MMOL/L (ref 136–145)

## 2022-09-09 NOTE — TELEPHONE ENCOUNTER
SPOKE WITH [employer]. VERIFIED  AND ADDRESS. OK PER RELEASE.     MADE AWARE. ASK FOR LABS TO BE FAXED OVER, WHICH I DID DO

## 2022-09-09 NOTE — TELEPHONE ENCOUNTER
----- Message from Rico Monzon MD sent at 9/9/2022 12:51 PM EDT -----  Serum sodium is mildly below the normal range.  This is not clinically significant at this time, but we will need to monitor it.  If it worsens, it could lead to worsening seizures, confusion and other issues.  We will repeat this lab at his appointment with me later this month.

## 2022-09-21 ENCOUNTER — TELEPHONE (OUTPATIENT)
Dept: INTERNAL MEDICINE | Facility: CLINIC | Age: 42
End: 2022-09-21

## 2022-09-21 NOTE — TELEPHONE ENCOUNTER
Caller: KARINA PERR[employer] SUPERVISOR     Relationship:     Best call back number: [phone]      What is the best time to reach you: ANYTIME       Who are you requesting to speak with (clinical staff, provider,  specific staff member): CLINICAL       What was the call regarding: KARINA HOUSE SUPERVISOR IS CALLING REQUESTING, CLINICAL DOCUMENTATION  FROM PCP, FOR PATIENT'S DIET RESTRICTIONS, AND CALORIE COUNT, FAX NUMBER  FOR [facility] FAX NUMBER,  [phone]     Do you require a callback: YES

## 2022-09-22 NOTE — TELEPHONE ENCOUNTER
Could you contact [facility] to get more information as to the documentation they need?  I gave them rather a lot of paperwork regarding this last time, and it might just be a matter of us printing it out from media for them.

## 2022-09-23 NOTE — TELEPHONE ENCOUNTER
Spoke with Alicia and asked her what they needed however she couldn't really talk because she was out of it from surgery. She is supposed to call us back on Monday to further discuss things.

## 2022-09-30 ENCOUNTER — OFFICE VISIT (OUTPATIENT)
Dept: NEUROLOGY | Facility: CLINIC | Age: 42
End: 2022-09-30

## 2022-09-30 ENCOUNTER — OFFICE VISIT (OUTPATIENT)
Dept: INTERNAL MEDICINE | Facility: CLINIC | Age: 42
End: 2022-09-30

## 2022-09-30 ENCOUNTER — TELEPHONE (OUTPATIENT)
Dept: NEUROLOGY | Facility: CLINIC | Age: 42
End: 2022-09-30

## 2022-09-30 ENCOUNTER — HOSPITAL ENCOUNTER (EMERGENCY)
Facility: HOSPITAL | Age: 42
Discharge: HOME | End: 2022-10-01
Attending: EMERGENCY MEDICINE

## 2022-09-30 VITALS
HEART RATE: 67 BPM | BODY MASS INDEX: 28.97 KG/M2 | SYSTOLIC BLOOD PRESSURE: 102 MMHG | DIASTOLIC BLOOD PRESSURE: 55 MMHG | WEIGHT: 184.6 LBS | HEIGHT: 67 IN

## 2022-09-30 VITALS
BODY MASS INDEX: 28.94 KG/M2 | TEMPERATURE: 97.1 F | HEART RATE: 60 BPM | DIASTOLIC BLOOD PRESSURE: 65 MMHG | SYSTOLIC BLOOD PRESSURE: 99 MMHG | WEIGHT: 184.4 LBS | OXYGEN SATURATION: 98 % | HEIGHT: 67 IN

## 2022-09-30 DIAGNOSIS — F79 INTELLECTUAL DISABILITY: ICD-10-CM

## 2022-09-30 DIAGNOSIS — R56.9 SEIZURE: Primary | ICD-10-CM

## 2022-09-30 DIAGNOSIS — G40.909 SEIZURE DISORDER: Primary | ICD-10-CM

## 2022-09-30 DIAGNOSIS — G40.909 SEIZURE DISORDER: ICD-10-CM

## 2022-09-30 DIAGNOSIS — Z00.00 MEDICARE ANNUAL WELLNESS VISIT, SUBSEQUENT: Primary | ICD-10-CM

## 2022-09-30 DIAGNOSIS — E86.0 DEHYDRATION: ICD-10-CM

## 2022-09-30 DIAGNOSIS — E03.8 OTHER SPECIFIED HYPOTHYROIDISM: ICD-10-CM

## 2022-09-30 DIAGNOSIS — E87.1 HYPONATREMIA: ICD-10-CM

## 2022-09-30 LAB
ALBUMIN SERPL-MCNC: 4.2 G/DL (ref 3.5–5.2)
ALBUMIN/GLOB SERPL: 2 G/DL
ALP SERPL-CCNC: 50 U/L (ref 39–117)
ALT SERPL W P-5'-P-CCNC: 9 U/L (ref 1–41)
ANION GAP SERPL CALCULATED.3IONS-SCNC: 9 MMOL/L (ref 5–15)
AST SERPL-CCNC: 10 U/L (ref 1–40)
BASOPHILS # BLD AUTO: 0.01 10*3/MM3 (ref 0–0.2)
BASOPHILS # BLD AUTO: 0.01 10*3/MM3 (ref 0–0.2)
BASOPHILS NFR BLD AUTO: 0.1 % (ref 0–1.5)
BASOPHILS NFR BLD AUTO: 0.2 % (ref 0–1.5)
BILIRUB SERPL-MCNC: 0.3 MG/DL (ref 0–1.2)
BUN SERPL-MCNC: 21 MG/DL (ref 6–20)
BUN/CREAT SERPL: 28 (ref 7–25)
CALCIUM SPEC-SCNC: 9.5 MG/DL (ref 8.6–10.5)
CHLORIDE SERPL-SCNC: 98 MMOL/L (ref 98–107)
CHOLEST SERPL-MCNC: 121 MG/DL (ref 0–200)
CO2 SERPL-SCNC: 28 MMOL/L (ref 22–29)
CREAT SERPL-MCNC: 0.75 MG/DL (ref 0.76–1.27)
DEPRECATED RDW RBC AUTO: 41.7 FL (ref 37–54)
DEPRECATED RDW RBC AUTO: 42.9 FL (ref 37–54)
EGFRCR SERPLBLD CKD-EPI 2021: 116.3 ML/MIN/1.73
EOSINOPHIL # BLD AUTO: 0.01 10*3/MM3 (ref 0–0.4)
EOSINOPHIL # BLD AUTO: 0.02 10*3/MM3 (ref 0–0.4)
EOSINOPHIL NFR BLD AUTO: 0.1 % (ref 0.3–6.2)
EOSINOPHIL NFR BLD AUTO: 0.3 % (ref 0.3–6.2)
ERYTHROCYTE [DISTWIDTH] IN BLOOD BY AUTOMATED COUNT: 12.7 % (ref 12.3–15.4)
ERYTHROCYTE [DISTWIDTH] IN BLOOD BY AUTOMATED COUNT: 12.8 % (ref 12.3–15.4)
GLOBULIN UR ELPH-MCNC: 2.1 GM/DL
GLUCOSE SERPL-MCNC: 101 MG/DL (ref 65–99)
HCT VFR BLD AUTO: 41.8 % (ref 37.5–51)
HCT VFR BLD AUTO: 42 % (ref 37.5–51)
HDLC SERPL-MCNC: 39 MG/DL (ref 40–60)
HGB BLD-MCNC: 13.7 G/DL (ref 13–17.7)
HGB BLD-MCNC: 14.2 G/DL (ref 13–17.7)
HOLD SPECIMEN: NORMAL
HOLD SPECIMEN: NORMAL
IMM GRANULOCYTES # BLD AUTO: 0.01 10*3/MM3 (ref 0–0.05)
IMM GRANULOCYTES # BLD AUTO: 0.01 10*3/MM3 (ref 0–0.05)
IMM GRANULOCYTES NFR BLD AUTO: 0.1 % (ref 0–0.5)
IMM GRANULOCYTES NFR BLD AUTO: 0.2 % (ref 0–0.5)
LDLC SERPL CALC-MCNC: 67 MG/DL (ref 0–100)
LDLC/HDLC SERPL: 1.73 {RATIO}
LYMPHOCYTES # BLD AUTO: 3.29 10*3/MM3 (ref 0.7–3.1)
LYMPHOCYTES # BLD AUTO: 3.46 10*3/MM3 (ref 0.7–3.1)
LYMPHOCYTES NFR BLD AUTO: 47.1 % (ref 19.6–45.3)
LYMPHOCYTES NFR BLD AUTO: 53.3 % (ref 19.6–45.3)
MCH RBC QN AUTO: 29.8 PG (ref 26.6–33)
MCH RBC QN AUTO: 30.2 PG (ref 26.6–33)
MCHC RBC AUTO-ENTMCNC: 32.6 G/DL (ref 31.5–35.7)
MCHC RBC AUTO-ENTMCNC: 34 G/DL (ref 31.5–35.7)
MCV RBC AUTO: 88.9 FL (ref 79–97)
MCV RBC AUTO: 91.5 FL (ref 79–97)
MONOCYTES # BLD AUTO: 0.45 10*3/MM3 (ref 0.1–0.9)
MONOCYTES # BLD AUTO: 0.51 10*3/MM3 (ref 0.1–0.9)
MONOCYTES NFR BLD AUTO: 6.4 % (ref 5–12)
MONOCYTES NFR BLD AUTO: 7.9 % (ref 5–12)
NEUTROPHILS NFR BLD AUTO: 2.48 10*3/MM3 (ref 1.7–7)
NEUTROPHILS NFR BLD AUTO: 3.22 10*3/MM3 (ref 1.7–7)
NEUTROPHILS NFR BLD AUTO: 38.1 % (ref 42.7–76)
NEUTROPHILS NFR BLD AUTO: 46.2 % (ref 42.7–76)
NRBC BLD AUTO-RTO: 0 /100 WBC (ref 0–0.2)
NRBC BLD AUTO-RTO: 0 /100 WBC (ref 0–0.2)
PLATELET # BLD AUTO: 154 10*3/MM3 (ref 140–450)
PLATELET # BLD AUTO: 158 10*3/MM3 (ref 140–450)
PMV BLD AUTO: 12.2 FL (ref 6–12)
PMV BLD AUTO: 12.2 FL (ref 6–12)
POTASSIUM SERPL-SCNC: 4.3 MMOL/L (ref 3.5–5.2)
PROT SERPL-MCNC: 6.3 G/DL (ref 6–8.5)
RBC # BLD AUTO: 4.59 10*6/MM3 (ref 4.14–5.8)
RBC # BLD AUTO: 4.7 10*6/MM3 (ref 4.14–5.8)
SODIUM SERPL-SCNC: 135 MMOL/L (ref 136–145)
TRIGL SERPL-MCNC: 72 MG/DL (ref 0–150)
TSH SERPL DL<=0.05 MIU/L-ACNC: 0.93 UIU/ML (ref 0.27–4.2)
VALPROATE SERPL-MCNC: 82.9 MCG/ML (ref 50–125)
VLDLC SERPL-MCNC: 15 MG/DL (ref 5–40)
WBC NRBC COR # BLD: 6.49 10*3/MM3 (ref 3.4–10.8)
WBC NRBC COR # BLD: 6.99 10*3/MM3 (ref 3.4–10.8)
WHOLE BLOOD HOLD COAG: NORMAL
WHOLE BLOOD HOLD SPECIMEN: NORMAL

## 2022-09-30 PROCEDURE — 99213 OFFICE O/P EST LOW 20 MIN: CPT | Performed by: PSYCHIATRY & NEUROLOGY

## 2022-09-30 PROCEDURE — 80061 LIPID PANEL: CPT | Performed by: STUDENT IN AN ORGANIZED HEALTH CARE EDUCATION/TRAINING PROGRAM

## 2022-09-30 PROCEDURE — 85025 COMPLETE CBC W/AUTO DIFF WBC: CPT

## 2022-09-30 PROCEDURE — 1170F FXNL STATUS ASSESSED: CPT | Performed by: STUDENT IN AN ORGANIZED HEALTH CARE EDUCATION/TRAINING PROGRAM

## 2022-09-30 PROCEDURE — 80164 ASSAY DIPROPYLACETIC ACD TOT: CPT

## 2022-09-30 PROCEDURE — 99214 OFFICE O/P EST MOD 30 MIN: CPT | Performed by: STUDENT IN AN ORGANIZED HEALTH CARE EDUCATION/TRAINING PROGRAM

## 2022-09-30 PROCEDURE — 85025 COMPLETE CBC W/AUTO DIFF WBC: CPT | Performed by: STUDENT IN AN ORGANIZED HEALTH CARE EDUCATION/TRAINING PROGRAM

## 2022-09-30 PROCEDURE — 36415 COLL VENOUS BLD VENIPUNCTURE: CPT

## 2022-09-30 PROCEDURE — 82550 ASSAY OF CK (CPK): CPT | Performed by: EMERGENCY MEDICINE

## 2022-09-30 PROCEDURE — 84443 ASSAY THYROID STIM HORMONE: CPT | Performed by: STUDENT IN AN ORGANIZED HEALTH CARE EDUCATION/TRAINING PROGRAM

## 2022-09-30 PROCEDURE — 1160F RVW MEDS BY RX/DR IN RCRD: CPT | Performed by: STUDENT IN AN ORGANIZED HEALTH CARE EDUCATION/TRAINING PROGRAM

## 2022-09-30 PROCEDURE — 99284 EMERGENCY DEPT VISIT MOD MDM: CPT

## 2022-09-30 PROCEDURE — 80053 COMPREHEN METABOLIC PANEL: CPT | Performed by: STUDENT IN AN ORGANIZED HEALTH CARE EDUCATION/TRAINING PROGRAM

## 2022-09-30 PROCEDURE — G0439 PPPS, SUBSEQ VISIT: HCPCS | Performed by: STUDENT IN AN ORGANIZED HEALTH CARE EDUCATION/TRAINING PROGRAM

## 2022-09-30 RX ORDER — LEVETIRACETAM 750 MG/1
TABLET ORAL
Qty: 120 TABLET | Refills: 8 | Status: SHIPPED | OUTPATIENT
Start: 2022-09-30 | End: 2022-09-30 | Stop reason: SDUPTHER

## 2022-09-30 RX ORDER — SODIUM CHLORIDE 0.9 % (FLUSH) 0.9 %
10 SYRINGE (ML) INJECTION AS NEEDED
Status: DISCONTINUED | OUTPATIENT
Start: 2022-09-30 | End: 2022-10-01 | Stop reason: HOSPADM

## 2022-09-30 RX ORDER — LEVETIRACETAM 750 MG/1
TABLET ORAL
Qty: 120 TABLET | Refills: 8 | Status: SHIPPED | OUTPATIENT
Start: 2022-09-30

## 2022-09-30 NOTE — PROGRESS NOTES
"Chief Complaint  Follow-up (Recent spells. )    Subjective          Jaleel Garcia is a 42 y.o. male who presents to [facility]  History of Present Illness  42-year-old man here for follow-up for seizures.  His caregiver is with him today.  Patient had a seizure last night as well as this morning.  Around last night lasted for 1 minute it was a mild generalized tonic-clonic seizure and then the 1 this morning lasted for 42 seconds.  This is the first time he had a seizure since December of last year according to the caregiver.  He is taking Depakote 500 mg 2 tablets daily and levetiracetam 750 mg twice a day.    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  There is people to supervise them.  He goes to her facility where there is another  program from 2:00 to 4:00 and he is taken back to his home.  He is independent with activities of daily living.  He is not aggressive.    He had a Depakote level 3 months ago which was therapeutic.    Objective   Vital Signs:   /55   Pulse 67   Ht 170.2 cm (67.01\")   Wt 83.7 kg (184 lb 9.6 oz)   BMI 28.91 kg/m²     Physical Exam   He is alert, fluent, phasic, follows commands well.  Heart is regular rhythm normal rate.        Assessment and Plan  Diagnoses and all orders for this visit:    1. Seizure disorder (HCC) (Primary)  Assessment & Plan:  He had a recurrent seizure last night and this morning for undetermined reason.  I will increase levetiracetam to 750 mg in the morning and 1500 mg in the evening for 2 weeks and then 1500 mg twice a day.  He is to continue taking Depakote  mg 2 tablets daily.  I will see him again in 8 months time for follow-up.      Other orders  -     levETIRAcetam (KEPPRA) 750 MG tablet; Take 2 tablets twice a day.  Dispense: 120 tablet; Refill: 8       Total time spent with the patient and coordinating patient care " was 25 minutes.    Follow Up  No follow-ups on file.  Patient was given instructions and counseling regarding his condition or for health maintenance advice. Please see specific information pulled into the AVS if appropriate.

## 2022-09-30 NOTE — ASSESSMENT & PLAN NOTE
He had a recurrent seizure last night and this morning for undetermined reason.  I will increase levetiracetam to 750 mg in the morning and 1500 mg in the evening for 2 weeks and then 1500 mg twice a day.  He is to continue taking Depakote  mg 2 tablets daily.  I will see him again in 8 months time for follow-up.

## 2022-09-30 NOTE — PROGRESS NOTES
"Chief Complaint  Medicare Wellness-subsequent    Subjective          Jaleel Garcia presents to [facility]  History of Present Illness    Historian: Priscilla (representative from [facility])    Jaleel experienced his first seizures since Christmas last night and this morning.    Experienced seizures last night and this morning.  Last night this event lasted 1 minute 12 seconds.  He experienced two this morning: la nena. 1.5 minute each (with a short space between).    He was seen and evaluated by Dr. Ponce of neurology, and his keppra has been increased.    Current Outpatient Medications   Medication Instructions   • Calcium Carbonate-Vitamin D (calcium-vitamin D) 500-200 MG-UNIT tablet per tablet 1 tablet, Oral, Daily   • cetirizine (ZYRTEC) 10 mg, Oral, Daily   • desmopressin (DDAVP) 0.2 mg, Oral, 2 Times Daily   • divalproex (DEPAKOTE ER) 1,000 mg, Oral, Daily   • levETIRAcetam (KEPPRA) 750 MG tablet Take 1 tab in am, and 2 tabs in pm for two weeks. Then take 2 tabs BID thereafter.   • levothyroxine (SYNTHROID, LEVOTHROID) 50 mcg, Oral, Every Early Morning   • sertraline (ZOLOFT) 150 mg, Oral, Daily       The following portions of the patient's history were reviewed and updated as appropriate: allergies, current medications, past family history, past medical history, past social history, past surgical history, and problem list.    Objective   Vital Signs:   BP 99/65 (BP Location: Left arm)   Pulse 60   Temp 97.1 °F (36.2 °C) (Temporal)   Ht 170.2 cm (67\")   Wt 83.6 kg (184 lb 6.4 oz)   SpO2 98%   BMI 28.88 kg/m²     Wt Readings from Last 3 Encounters:   09/30/22 83.6 kg (184 lb 6.4 oz)   09/30/22 83.7 kg (184 lb 9.6 oz)   09/08/22 82.6 kg (182 lb 3.2 oz)     BP Readings from Last 3 Encounters:   09/30/22 99/65   09/30/22 102/55   09/08/22 99/61     Physical Exam  Vitals reviewed.   Constitutional:       General: He is not in acute distress.     Appearance: " Normal appearance. He is not ill-appearing, toxic-appearing or diaphoretic.   HENT:      Head: Normocephalic and atraumatic.      Right Ear: Tympanic membrane, ear canal and external ear normal.      Left Ear: Tympanic membrane, ear canal and external ear normal.      Mouth/Throat:      Mouth: Mucous membranes are moist.      Pharynx: Oropharynx is clear. No oropharyngeal exudate or posterior oropharyngeal erythema.   Eyes:      Conjunctiva/sclera: Conjunctivae normal.   Cardiovascular:      Rate and Rhythm: Normal rate and regular rhythm.      Pulses: Normal pulses.      Heart sounds: No murmur heard.  Pulmonary:      Effort: Pulmonary effort is normal. No respiratory distress.      Breath sounds: Normal breath sounds. No stridor. No wheezing, rhonchi or rales.   Chest:      Chest wall: No tenderness.   Abdominal:      General: Abdomen is flat.      Palpations: Abdomen is soft. There is no mass.      Tenderness: There is no abdominal tenderness.   Musculoskeletal:      Right lower leg: No edema.      Left lower leg: No edema.   Skin:     General: Skin is warm and dry.   Neurological:      Mental Status: He is alert. Mental status is at baseline.       Result Review :   The following data was reviewed by: Rico Monzon MD on 09/30/2022:  Common labs    Common Labs 6/27/22 6/27/22 6/27/22 7/5/22 9/8/22    1620 1620 1620     Glucose   77 78 71   BUN   11 19 13   Creatinine   0.63 (A) 0.63 (A) 0.58 (A)   Sodium   125 (A) 139 135 (A)   Potassium   4.8 4.4 4.9   Chloride   92 (A) 104 98   Calcium   8.9 9.2 9.2   Albumin   4.20     Total Bilirubin   0.4     Alkaline Phosphatase   60     AST (SGOT)   16     ALT (SGPT)   11     WBC 5.05       Hemoglobin 14.3       Hematocrit 42.5       Platelets 199       Total Cholesterol  135      Triglycerides  130      HDL Cholesterol  40      LDL Cholesterol   72      (A) Abnormal value                   Lab Results   Component Value Date    COVID19 NOT DETECTED 06/04/2020    INR  1.10 (L) 12/26/2021    BILIRUBINUR Negative 12/26/2021       Procedures        Assessment and Plan    Diagnoses and all orders for this visit:    1. Medicare annual wellness visit, subsequent (Primary)  -     CBC & Differential  -     Comprehensive Metabolic Panel  -     TSH  -     Lipid Panel    2. Hyponatremia  Comments:  -Will cautiously continue 1,500 ml (50 oz) fluid restriction  Orders:  -     Comprehensive Metabolic Panel    3. Seizure disorder (HCC)  Comments:  -Keppra increased due to increased seizure activity  -Checking Na today    4. Other specified hypothyroidism  -     TSH    5. Intellectual disability          There are no discontinued medications.       Follow Up   Return in about 3 months (around 12/30/2022) for Hyponatremia.  Patient was given instructions and counseling regarding his condition or for health maintenance advice. Please see specific information pulled into the AVS if appropriate.       Rico Monzon MD  09/30/22  15:44 EDT

## 2022-09-30 NOTE — PATIENT INSTRUCTIONS
Cooking With Less Salt  Cooking with less salt is one way to reduce the amount of sodium you get from food. Sodium is one of the elements that make up salt. It is found naturally in foods and is also added to certain foods. Depending on your condition and overall health, your health care provider or dietitian may recommend that you reduce your sodium intake. Most people should have less than 2,300 milligrams (mg) of sodium each day. If you have high blood pressure (hypertension), you may need to limit your sodium to 1,500 mg each day. Follow the tips below to help reduce your sodium intake.  What are tips for eating less sodium?  Reading food labels       Check the food label before buying or using packaged ingredients. Always check the label for the serving size and sodium content.  Look for products with no more than 140 mg of sodium in one serving.  Check the % Daily Value column to see what percent of the daily recommended amount of sodium is provided in one serving of the product. Foods with 5% or less in this column are considered low in sodium. Foods with 20% or higher are considered high in sodium.  Do not choose foods with salt as one of the first three ingredients on the ingredients list. If salt is one of the first three ingredients, it usually means the item is high in sodium.     Shopping  Buy sodium-free or low-sodium products. Look for the following words on food labels:  Low-sodium.  Sodium-free.  Reduced-sodium.  No salt added.  Unsalted.  Always check the sodium content even if foods are labeled as low-sodium or no salt added.  Buy fresh foods.  Cooking  Use herbs, seasonings without salt, and spices as substitutes for salt.  Use sodium-free baking soda when baking.  Hamlin, braise, or roast foods to add flavor with less salt.  Avoid adding salt to pasta, rice, or hot cereals.  Drain and rinse canned vegetables, beans, and meat before use.  Avoid adding salt when cooking sweets and desserts.  Cook  "with low-sodium ingredients.  What foods are high in sodium?  Vegetables  Regular canned vegetables (not low-sodium or reduced-sodium). Sauerkraut, pickled vegetables, and relishes. Olives. French fries. Onion rings. Regular canned tomato sauce and paste. Regular tomato and vegetable juice. Frozen vegetables in sauces.  Grains  Instant hot cereals. Bread stuffing, pancake, and biscuit mixes. Croutons. Seasoned rice or pasta mixes. Noodle soup cups. Boxed or frozen macaroni and cheese. Regular salted crackers. Self-rising flour. Rolls. Bagels. Flour tortillas and wraps.  Meats and other proteins  Meat or fish that is salted, canned, smoked, cured, spiced, or pickled. This includes santiago, ham, sausages, hot dogs, corned beef, chipped beef, meat loaves, salt pork, jerky, pickled herring, anchovies, regular canned tuna, and sardines. Salted nuts.  Dairy  Processed cheese and cheese spreads. Cheese curds. Blue cheese. Feta cheese. String cheese. Regular cottage cheese. Buttermilk. Canned milk.  The items listed above may not be a complete list of foods high in sodium. Actual amounts of sodium may be different depending on processing. Contact a dietitian for more information.  What foods are low in sodium?  Fruits  Fresh, frozen, or canned fruit with no sauce added. Fruit juice.  Vegetables  Fresh or frozen vegetables with no sauce added. \"No salt added\" canned vegetables. \"No salt added\" tomato sauce and paste. Low-sodium or reduced-sodium tomato and vegetable juice.  Grains  Noodles, pasta, quinoa, rice. Shredded or puffed wheat or puffed rice. Regular or quick oats (not instant). Low-sodium crackers. Low-sodium bread. Whole-grain bread and whole-grain pasta. Unsalted popcorn.  Meats and other proteins  Fresh or frozen whole meats, poultry (not injected with sodium), and fish with no sauce added. Unsalted nuts. Dried peas, beans, and lentils without added salt. Unsalted canned beans. Eggs. Unsalted nut butters. " Low-sodium canned tuna or chicken.  Dairy  Milk. Soy milk. Yogurt. Low-sodium cheeses, such as Swiss, [place], mozzarella, and ricotta. Sherbet or ice cream (keep to ½ cup per serving). Cream cheese.  Fats and oils  Unsalted butter or margarine.  Other foods  Homemade pudding. Sodium-free baking soda and baking powder. Herbs and spices. Low-sodium seasoning mixes.  Beverages  Coffee and tea. Carbonated beverages.  The items listed above may not be a complete list of foods low in sodium. Actual amounts of sodium may be different depending on processing. Contact a dietitian for more information.  What are some salt alternatives when cooking?  The following are herbs, seasonings, and spices that can be used instead of salt to flavor your food. Herbs should be fresh or dried. Do not choose packaged mixes. Next to the name of the herb, spice, or seasoning are some examples of foods you can pair it with.  Herbs  Bay leaves - Soups, meat and vegetable dishes, and spaghetti sauce.  Basil - Italian dishes, soups, pasta, and fish dishes.  Cilantro - Meat, poultry, and vegetable dishes.  Chili powder - Marinades and Mexican dishes.  Chives - Salad dressings and potato dishes.  Cumin - Mexican dishes, couscous, and meat dishes.  Dill - Fish dishes, sauces, and salads.  Fennel - Meat and vegetable dishes, breads, and cookies.  Garlic (do not use garlic salt) - Italian dishes, meat dishes, salad dressings, and sauces.  Marjoram - Soups, potato dishes, and meat dishes.  Oregano - Pizza and spaghetti sauce.  Parsley - Salads, soups, pasta, and meat dishes.  Quynh - Italian dishes, salad dressings, soups, and red meats.  Saffron - Fish dishes, pasta, and some poultry dishes.  Tony - Stuffings and sauces.  Tarragon - Fish and poultry dishes.  Thyme - Stuffing, meat, and fish dishes.  Seasonings  Lemon juice - Fish dishes, poultry dishes, vegetables, and salads.  Vinegar - Salad dressings, vegetables, and fish  "dishes.  Spices  Cinnamon - Sweet dishes, such as cakes, cookies, and puddings.  Cloves - Gingerbread, puddings, and marinades for meats.  Shukla - Vegetable dishes, fish and poultry dishes, and stir-mello dishes.  Martha - Vegetable dishes, fish dishes, and stir-mello dishes.  Nutmeg - Pasta, vegetables, poultry, fish dishes, and custard.  Summary  Cooking with less salt is one way to reduce the amount of sodium that you get from food.  Buy sodium-free or low-sodium products.  Check the food label before using or buying packaged ingredients.  Use herbs, seasonings without salt, and spices as substitutes for salt in foods.  This information is not intended to replace advice given to you by your health care provider. Make sure you discuss any questions you have with your health care provider.  Document Revised: 12/09/2020 Document Reviewed: 12/09/2020  [employer] Patient Education © 2021 [employer]      https://www.nhlbi.nih.gov/files/docs/public/heart/dash_brief.pdf\">   DASH Eating Plan  DASH stands for Dietary Approaches to Stop Hypertension. The DASH eating plan is a healthy eating plan that has been shown to:  Reduce high blood pressure (hypertension).  Reduce your risk for type 2 diabetes, heart disease, and stroke.  Help with weight loss.  What are tips for following this plan?  Reading food labels  Check food labels for the amount of salt (sodium) per serving. Choose foods with less than 5 percent of the Daily Value of sodium. Generally, foods with less than 300 milligrams (mg) of sodium per serving fit into this eating plan.  To find whole grains, look for the word \"whole\" as the first word in the ingredient list.  Shopping  Buy products labeled as \"low-sodium\" or \"no salt added.\"  Buy fresh foods. Avoid canned foods and pre-made or frozen meals.  Cooking  Avoid adding salt when cooking. Use salt-free seasonings or herbs instead of table salt or sea salt. Check with your health care provider or pharmacist before " using salt substitutes.  Do not mello foods. Cook foods using healthy methods such as baking, boiling, grilling, roasting, and broiling instead.  Cook with heart-healthy oils, such as olive, canola, avocado, soybean, or sunflower oil.  Meal planning       Eat a balanced diet that includes:  4 or more servings of fruits and 4 or more servings of vegetables each day. Try to fill one-half of your plate with fruits and vegetables.  6-8 servings of whole grains each day.  Less than 6 oz (170 g) of lean meat, poultry, or fish each day. A 3-oz (85-g) serving of meat is about the same size as a deck of cards. One egg equals 1 oz (28 g).  2-3 servings of low-fat dairy each day. One serving is 1 cup (237 mL).  1 serving of nuts, seeds, or beans 5 times each week.  2-3 servings of heart-healthy fats. Healthy fats called omega-3 fatty acids are found in foods such as walnuts, flaxseeds, fortified milks, and eggs. These fats are also found in cold-water fish, such as sardines, salmon, and mackerel.  Limit how much you eat of:  Canned or prepackaged foods.  Food that is high in trans fat, such as some fried foods.  Food that is high in saturated fat, such as fatty meat.  Desserts and other sweets, sugary drinks, and other foods with added sugar.  Full-fat dairy products.  Do not salt foods before eating.  Do not eat more than 4 egg yolks a week.  Try to eat at least 2 vegetarian meals a week.  Eat more home-cooked food and less restaurant, buffet, and fast food.     Lifestyle  When eating at a restaurant, ask that your food be prepared with less salt or no salt, if possible.  If you drink alcohol:  Limit how much you use to:  0-1 drink a day for women who are not pregnant.  0-2 drinks a day for men.  Be aware of how much alcohol is in your drink. In the [place] one drink equals one 12 oz bottle of beer (355 mL), one 5 oz glass of wine (148 mL), or one 1½ oz glass of hard liquor (44 mL).  General information  Avoid eating more than  2,300 mg of salt a day. If you have hypertension, you may need to reduce your sodium intake to 1,500 mg a day.  Work with your health care provider to maintain a healthy body weight or to lose weight. Ask what an ideal weight is for you.  Get at least 30 minutes of exercise that causes your heart to beat faster (aerobic exercise) most days of the week. Activities may include walking, swimming, or biking.  Work with your health care provider or dietitian to adjust your eating plan to your individual calorie needs.  What foods should I eat?  Fruits  All fresh, dried, or frozen fruit. Canned fruit in natural juice (without added sugar).  Vegetables  Fresh or frozen vegetables (raw, steamed, roasted, or grilled). Low-sodium or reduced-sodium tomato and vegetable juice. Low-sodium or reduced-sodium tomato sauce and tomato paste. Low-sodium or reduced-sodium canned vegetables.  Grains  Whole-grain or whole-wheat bread. Whole-grain or whole-wheat pasta. Brown rice. Oatmeal. Quinoa. Bulgur. Whole-grain and low-sodium cereals. [place] bread. Low-fat, low-sodium crackers. Whole-wheat flour tortillas.  Meats and other proteins  Skinless chicken or turkey. Ground chicken or turkey. Pork with fat trimmed off. Fish and seafood. Egg whites. Dried beans, peas, or lentils. Unsalted nuts, nut butters, and seeds. Unsalted canned beans. Lean cuts of beef with fat trimmed off. Low-sodium, lean precooked or cured meat, such as sausages or meat loaves.  Dairy  Low-fat (1%) or fat-free (skim) milk. Reduced-fat, low-fat, or fat-free cheeses. Nonfat, low-sodium ricotta or cottage cheese. Low-fat or nonfat yogurt. Low-fat, low-sodium cheese.  Fats and oils  Soft margarine without trans fats. Vegetable oil. Reduced-fat, low-fat, or light mayonnaise and salad dressings (reduced-sodium). Canola, safflower, olive, avocado, soybean, and sunflower oils. Avocado.  Seasonings and condiments  Herbs. Spices. Seasoning mixes without salt.  Other  foods  Unsalted popcorn and pretzels. Fat-free sweets.  The items listed above may not be a complete list of foods and beverages you can eat. Contact a dietitian for more information.  What foods should I avoid?  Fruits  Canned fruit in a light or heavy syrup. Fried fruit. Fruit in cream or butter sauce.  Vegetables  Creamed or fried vegetables. Vegetables in a cheese sauce. Regular canned vegetables (not low-sodium or reduced-sodium). Regular canned tomato sauce and paste (not low-sodium or reduced-sodium). Regular tomato and vegetable juice (not low-sodium or reduced-sodium). Pickles. Olives.  Grains  Baked goods made with fat, such as croissants, muffins, or some breads. Dry pasta or rice meal packs.  Meats and other proteins  Fatty cuts of meat. Ribs. Fried meat. Villegas. Bologna, salami, and other precooked or cured meats, such as sausages or meat loaves. Fat from the back of a pig (fatback). Bratwurst. Salted nuts and seeds. Canned beans with added salt. Canned or smoked fish. Whole eggs or egg yolks. Chicken or turkey with skin.  Dairy  Whole or 2% milk, cream, and half-and-half. Whole or full-fat cream cheese. Whole-fat or sweetened yogurt. Full-fat cheese. Nondairy creamers. Whipped toppings. Processed cheese and cheese spreads.  Fats and oils  Butter. Stick margarine. Lard. Shortening. Ghee. Villegas fat. Tropical oils, such as coconut, palm kernel, or palm oil.  Seasonings and condiments  Onion salt, garlic salt, seasoned salt, table salt, and sea salt. Worcestershire sauce. Tartar sauce. Barbecue sauce. Teriyaki sauce. Soy sauce, including reduced-sodium. Steak sauce. Canned and packaged gravies. Fish sauce. Oyster sauce. Cocktail sauce. Store-bought horseradish. Ketchup. Mustard. Meat flavorings and tenderizers. Bouillon cubes. Hot sauces. Pre-made or packaged marinades. Pre-made or packaged taco seasonings. Relishes. Regular salad dressings.  Other foods  Salted popcorn and pretzels.  The items listed above  may not be a complete list of foods and beverages you should avoid. Contact a dietitian for more information.  Where to find more information  National Heart, Lung, and Blood [place]: www.nhlbi.nih.gov  American Heart Association: www.heart.org  Academy of Nutrition and Dietetics: www.eatright.org  National Kidney Foundation: www.kidney.org  Summary  The DASH eating plan is a healthy eating plan that has been shown to reduce high blood pressure (hypertension). It may also reduce your risk for type 2 diabetes, heart disease, and stroke.  When on the DASH eating plan, aim to eat more fresh fruits and vegetables, whole grains, lean proteins, low-fat dairy, and heart-healthy fats.  With the DASH eating plan, you should limit salt (sodium) intake to 2,300 mg a day. If you have hypertension, you may need to reduce your sodium intake to 1,500 mg a day.  Work with your health care provider or dietitian to adjust your eating plan to your individual calorie needs.  This information is not intended to replace advice given to you by your health care provider. Make sure you discuss any questions you have with your health care provider.  Document Revised: 11/20/2020 Document Reviewed: 11/20/2020  [employer] Patient Education © 2021 [employer].       Heart-Healthy Eating Plan  Heart-healthy meal planning includes:  Eating less unhealthy fats.  Eating more healthy fats.  Making other changes in your diet.  Talk with your doctor or a diet specialist (dietitian) to create an eating plan that is right for you.  What is my plan?  Your doctor may recommend an eating plan that includes:  Total fat: ______% or less of total calories a day.  Saturated fat: ______% or less of total calories a day.  Cholesterol: less than _________mg a day.  What are tips for following this plan?  Cooking  Avoid frying your food. Try to bake, boil, grill, or broil it instead. You can also reduce fat by:  Removing the skin from poultry.  Removing all  visible fats from meats.  Steaming vegetables in water or broth.  Meal planning       At meals, divide your plate into four equal parts:  Fill one-half of your plate with vegetables and green salads.  Fill one-fourth of your plate with whole grains.  Fill one-fourth of your plate with lean protein foods.  Eat 4-5 servings of vegetables per day. A serving of vegetables is:  1 cup of raw or cooked vegetables.  2 cups of raw leafy greens.  Eat 4-5 servings of fruit per day. A serving of fruit is:  1 medium whole fruit.  ¼ cup of dried fruit.  ½ cup of fresh, frozen, or canned fruit.  ½ cup of 100% fruit juice.  Eat more foods that have soluble fiber. These are apples, broccoli, carrots, beans, peas, and barley. Try to get 20-30 g of fiber per day.  Eat 4-5 servings of nuts, legumes, and seeds per week:  1 serving of dried beans or legumes equals ½ cup after being cooked.  1 serving of nuts is ¼ cup.  1 serving of seeds equals 1 tablespoon.     General information  Eat more home-cooked food. Eat less restaurant, buffet, and fast food.  Limit or avoid alcohol.  Limit foods that are high in starch and sugar.  Avoid fried foods.  Lose weight if you are overweight.  Keep track of how much salt (sodium) you eat. This is important if you have high blood pressure. Ask your doctor to tell you more about this.  Try to add vegetarian meals each week.  Fats  Choose healthy fats. These include olive oil and canola oil, flaxseeds, walnuts, almonds, and seeds.  Eat more omega-3 fats. These include salmon, mackerel, sardines, tuna, flaxseed oil, and ground flaxseeds. Try to eat fish at least 2 times each week.  Check food labels. Avoid foods with trans fats or high amounts of saturated fat.  Limit saturated fats.  These are often found in animal products, such as meats, butter, and cream.  These are also found in plant foods, such as palm oil, palm kernel oil, and coconut oil.  Avoid foods with partially hydrogenated oils in them.  These have trans fats. Examples are stick margarine, some tub margarines, cookies, crackers, and other baked goods.  What foods can I eat?  Fruits  All fresh, canned (in natural juice), or frozen fruits.  Vegetables  Fresh or frozen vegetables (raw, steamed, roasted, or grilled). Green salads.  Grains  Most grains. Choose whole wheat and whole grains most of the time. Rice and pasta, including brown rice and pastas made with whole wheat.  Meats and other proteins  Lean, well-trimmed beef, veal, pork, and lamb. Chicken and turkey without skin. All fish and shellfish. Wild duck, rabbit, pheasant, and venison. Egg whites or low-cholesterol egg substitutes. Dried beans, peas, lentils, and tofu. Seeds and most nuts.  Dairy  Low-fat or nonfat cheeses, including ricotta and mozzarella. Skim or 1% milk that is liquid, powdered, or evaporated. Buttermilk that is made with low-fat milk. Nonfat or low-fat yogurt.  Fats and oils  Non-hydrogenated (trans-free) margarines. Vegetable oils, including soybean, sesame, sunflower, olive, peanut, safflower, corn, canola, and cottonseed. Salad dressings or mayonnaise made with a vegetable oil.  Beverages  Mineral water. Coffee and tea. Diet carbonated beverages.  Sweets and desserts  Sherbet, gelatin, and fruit ice. Small amounts of dark chocolate.  Limit all sweets and desserts.  Seasonings and condiments  All seasonings and condiments.  The items listed above may not be a complete list of foods and drinks you can eat. Contact a dietitian for more options.  What foods should I avoid?  Fruits  Canned fruit in heavy syrup. Fruit in cream or butter sauce. Fried fruit. Limit coconut.  Vegetables  Vegetables cooked in cheese, cream, or butter sauce. Fried vegetables.  Grains  Breads that are made with saturated or trans fats, oils, or whole milk. Croissants. Sweet rolls. Donuts. High-fat crackers, such as cheese crackers.  Meats and other proteins  Fatty meats, such as hot dogs, ribs,  sausage, santiago, rib-eye roast or steak. High-fat deli meats, such as salami and bologna. Caviar. Domestic duck and goose. Organ meats, such as liver.  Dairy  Cream, sour cream, cream cheese, and creamed cottage cheese. Whole-milk cheeses. Whole or 2% milk that is liquid, evaporated, or condensed. Whole buttermilk. Cream sauce or high-fat cheese sauce. Yogurt that is made from whole milk.  Fats and oils  Meat fat, or shortening. Cocoa butter, hydrogenated oils, palm oil, coconut oil, palm kernel oil. Solid fats and shortenings, including santiago fat, salt pork, lard, and butter. Nondairy cream substitutes. Salad dressings with cheese or sour cream.  Beverages  Regular sodas and juice drinks with added sugar.  Sweets and desserts  Frosting. Pudding. Cookies. Cakes. Pies. Milk chocolate or white chocolate. Buttered syrups. Full-fat ice cream or ice cream drinks.  The items listed above may not be a complete list of foods and drinks to avoid. Contact a dietitian for more information.  Summary  Heart-healthy meal planning includes eating less unhealthy fats, eating more healthy fats, and making other changes in your diet.  Eat a balanced diet. This includes fruits and vegetables, low-fat or nonfat dairy, lean protein, nuts and legumes, whole grains, and heart-healthy oils and fats.  This information is not intended to replace advice given to you by your health care provider. Make sure you discuss any questions you have with your health care provider.  Document Revised: 02/21/2019 Document Reviewed: 01/25/2019  [employer]vier Patient Education © 2021 [employer] Inc.       Mediterranean Diet  A Mediterranean diet refers to food and lifestyle choices that are based on the traditions of countries located on the [place]. This way of eating has been shown to help prevent certain conditions and improve outcomes for people who have chronic diseases, like kidney disease and heart disease.  What are tips for following this  plan?  Lifestyle  Cook and eat meals together with your family, when possible.  Drink enough fluid to keep your urine clear or pale yellow.  Be physically active every day. This includes:  Aerobic exercise like running or swimming.  Leisure activities like gardening, walking, or housework.  Get 7-8 hours of sleep each night.  If recommended by your health care provider, drink red wine in moderation. This means 1 glass a day for nonpregnant women and 2 glasses a day for men. A glass of wine equals 5 oz (150 mL).  Reading food labels       Check the serving size of packaged foods. For foods such as rice and pasta, the serving size refers to the amount of cooked product, not dry.  Check the total fat in packaged foods. Avoid foods that have saturated fat or trans fats.  Check the ingredients list for added sugars, such as corn syrup.     Shopping  At the grocery store, buy most of your food from the areas near the walls of the store. This includes:  Fresh fruits and vegetables (produce).  Grains, beans, nuts, and seeds. Some of these may be available in unpackaged forms or large amounts (in bulk).  Fresh seafood.  Poultry and eggs.  Low-fat dairy products.  Buy whole ingredients instead of prepackaged foods.  Buy fresh fruits and vegetables in-season from local farmers markets.  Buy frozen fruits and vegetables in resealable bags.  If you do not have access to quality fresh seafood, buy precooked frozen shrimp or canned fish, such as tuna, salmon, or sardines.  Buy small amounts of raw or cooked vegetables, salads, or olives from the deli or salad bar at your store.  Stock your pantry so you always have certain foods on hand, such as olive oil, canned tuna, canned tomatoes, rice, pasta, and beans.  Cooking  Cook foods with extra-virgin olive oil instead of using butter or other vegetable oils.  Have meat as a side dish, and have vegetables or grains as your main dish. This means having meat in small portions or adding  small amounts of meat to foods like pasta or stew.  Use beans or vegetables instead of meat in common dishes like chili or lasagna.  [place] with different cooking methods. Try roasting or broiling vegetables instead of steaming or sautéeing them.  Add frozen vegetables to soups, stews, pasta, or rice.  Add nuts or seeds for added healthy fat at each meal. You can add these to yogurt, salads, or vegetable dishes.  Marinate fish or vegetables using olive oil, lemon juice, garlic, and fresh herbs.  Meal planning       Plan to eat 1 vegetarian meal one day each week. Try to work up to 2 vegetarian meals, if possible.  Eat seafood 2 or more times a week.  Have healthy snacks readily available, such as:  Vegetable sticks with hummus.  Greek yogurt.  Fruit and nut trail mix.  Eat balanced meals throughout the week. This includes:  Fruit: 2-3 servings a day  Vegetables: 4-5 servings a day  Low-fat dairy: 2 servings a day  Fish, poultry, or lean meat: 1 serving a day  Beans and legumes: 2 or more servings a week  Nuts and seeds: 1-2 servings a day  Whole grains: 6-8 servings a day  Extra-virgin olive oil: 3-4 servings a day  Limit red meat and sweets to only a few servings a month     What are my food choices?  Mediterranean diet  Recommended  Grains: Whole-grain pasta. Brown rice. Bulgar wheat. Polenta. Couscous. Whole-wheat bread. Oatmeal. Quinoa.  Vegetables: Artichokes. Beets. Broccoli. Cabbage. Carrots. Eggplant. Green beans. Chard. Kale. Spinach. Onions. Leeks. Peas. Squash. Tomatoes. Peppers. Radishes.  Fruits: Apples. Apricots. Avocado. Berries. Bananas. Cherries. Dates. Figs. Grapes. Minor. Melon. Oranges. Peaches. Plums. Pomegranate.  Meats and other protein foods: Beans. Almonds. Sunflower seeds. Pine nuts. Peanuts. Cod. Chavies. Scallops. Shrimp. Tuna. Tilapia. Clams. Oysters. Eggs.  Dairy: Low-fat milk. Cheese. Greek yogurt.  Beverages: Water. Red wine. Herbal tea.  Fats and oils: Extra virgin olive oil.  Avocado oil. Grape seed oil.  Sweets and desserts: Greek yogurt with honey. Baked apples. Poached pears. Trail mix.  Seasoning and other foods: Basil. Cilantro. Coriander. Cumin. Mint. Parsley. Tony. Rosemary. Tarragon. Garlic. Oregano. Thyme. Pepper. Balsalmic vinegar. Tahini. Hummus. Tomato sauce. Olives. Mushrooms.  Limit these  Grains: Prepackaged pasta or rice dishes. Prepackaged cereal with added sugar.  Vegetables: Deep fried potatoes (french fries).  Fruits: Fruit canned in syrup.  Meats and other protein foods: Beef. Pork. Lamb. Poultry with skin. Hot dogs. Villegas.  Dairy: Ice cream. Sour cream. Whole milk.  Beverages: Juice. Sugar-sweetened soft drinks. Beer. Liquor and spirits.  Fats and oils: Butter. Canola oil. Vegetable oil. Beef fat (tallow). Lard.  Sweets and desserts: Cookies. Cakes. Pies. Candy.  Seasoning and other foods: Mayonnaise. Premade sauces and marinades.  The items listed may not be a complete list. Talk with your dietitian about what dietary choices are right for you.  Summary  The Mediterranean diet includes both food and lifestyle choices.  Eat a variety of fresh fruits and vegetables, beans, nuts, seeds, and whole grains.  Limit the amount of red meat and sweets that you eat.  Talk with your health care provider about whether it is safe for you to drink red wine in moderation. This means 1 glass a day for nonpregnant women and 2 glasses a day for men. A glass of wine equals 5 oz (150 mL).  This information is not intended to replace advice given to you by your health care provider. Make sure you discuss any questions you have with your health care provider.  Document Revised: 08/17/2017 Document Reviewed: 08/10/2017  Elsevier Patient Education © 2020 [employer]         Exercising to Stay Healthy  To become healthy and stay healthy, it is recommended that you do moderate-intensity and vigorous-intensity exercise. You can tell that you are exercising at a moderate intensity if your  heart starts beating faster and you start breathing faster but can still hold a conversation. You can tell that you are exercising at a vigorous intensity if you are breathing much harder and faster and cannot hold a conversation while exercising.  Exercising regularly is important. It has many health benefits, such as:  Improving overall fitness, flexibility, and endurance.  Increasing bone density.  Helping with weight control.  Decreasing body fat.  Increasing muscle strength.  Reducing stress and tension.  Improving overall health.  How often should I exercise?  Choose an activity that you enjoy, and set realistic goals. Your health care provider can help you make an activity plan that works for you.  Exercise regularly as told by your health care provider. This may include:  Doing strength training two times a week, such as:  Lifting weights.  Using resistance bands.  Push-ups.  Sit-ups.  Yoga.  Doing a certain intensity of exercise for a given amount of time. Choose from these options:  A total of 150 minutes of moderate-intensity exercise every week.  A total of 75 minutes of vigorous-intensity exercise every week.  A mix of moderate-intensity and vigorous-intensity exercise every week.  Children, pregnant women, people who have not exercised regularly, people who are overweight, and older adults may need to talk with a health care provider about what activities are safe to do. If you have a medical condition, be sure to talk with your health care provider before you start a new exercise program.  What are some exercise ideas?    Moderate-intensity exercise ideas include:  Walking 1 mile (1.6 km) in about 15 minutes.  Biking.  Hiking.  Golfing.  Dancing.  Water aerobics.  Vigorous-intensity exercise ideas include:  Walking 4.5 miles (7.2 km) or more in about 1 hour.  Jogging or running 5 miles (8 km) in about 1 hour.  Biking 10 miles (16.1 km) or more in about 1 hour.  Lap swimming.  Roller-skating or in-line  skating.  Cross-country skiing.  Vigorous competitive sports, such as football, basketball, and soccer.  Jumping rope.  Aerobic dancing.  What are some everyday activities that can help me to get exercise?  Yard work, such as:  Pushing a .  Raking and bagging leaves.  Washing your car.  Pushing a stroller.  Shoveling snow.  Gardening.  Washing windows or floors.  How can I be more active in my day-to-day activities?  Use stairs instead of an elevator.  Take a walk during your lunch break.  If you drive, park your car farther away from your work or school.  If you take public transportation, get off one stop early and walk the rest of the way.  Stand up or walk around during all of your indoor phone calls.  Get up, stretch, and walk around every 30 minutes throughout the day.  Enjoy exercise with a friend. Support to continue exercising will help you keep a regular routine of activity.  What guidelines can I follow while exercising?  Before you start a new exercise program, talk with your health care provider.  Do not exercise so much that you hurt yourself, feel dizzy, or get very short of breath.  Wear comfortable clothes and wear shoes with good support.  Drink plenty of water while you exercise to prevent dehydration or heat stroke.  Work out until your breathing and your heartbeat get faster.  Where to find more information  [place] Department of Health and Human Services: www.hhs.gov  Centers for Disease Control and Prevention (CDC): www.cdc.gov  Summary  Exercising regularly is important. It will improve your overall fitness, flexibility, and endurance.  Regular exercise also will improve your overall health. It can help you control your weight, reduce stress, and improve your bone density.  Do not exercise so much that you hurt yourself, feel dizzy, or get very short of breath.  Before you start a new exercise program, talk with your health care provider.  This information is not intended to replace  advice given to you by your health care provider. Make sure you discuss any questions you have with your health care provider.  Document Revised: 11/30/2018 Document Reviewed: 11/08/2018  Elsevier Patient Education © 2021 [employer]           Mindfulness-Based Stress Reduction  Mindfulness-based stress reduction (MBSR) is a program that helps people learn to practice mindfulness. Mindfulness is the practice of intentionally paying attention to the present moment. It can be learned and practiced through techniques such as education, breathing exercises, meditation, and yoga. MBSR includes several mindfulness techniques in one program.  MBSR works best when you understand the treatment, are willing to try new things, and can commit to spending time practicing what you learn. MBSR training may include learning about:  How your emotions, thoughts, and reactions affect your body.  New ways to respond to things that cause negative thoughts to start (triggers).  How to notice your thoughts and let go of them.  Practicing awareness of everyday things that you normally do without thinking.  The techniques and goals of different types of meditation.  What are the benefits of MBSR?  MBSR can have many benefits, which include helping you to:  Develop self-awareness. This refers to knowing and understanding yourself.  Learn skills and attitudes that help you to participate in your own health care.  Learn new ways to care for yourself.  Be more accepting about how things are, and let things go.  Be less judgmental and approach things with an open mind.  Be patient with yourself and trust yourself more.  MBSR has also been shown to:  Reduce negative emotions, such as depression and anxiety.  Improve memory and focus.  Change how you sense and approach pain.  Boost your body's ability to fight infections.  Help you connect better with other people.  Improve your sense of well-being.  Follow these instructions at home:       Find  a local in-person or online MBSR program.  Set aside some time regularly for mindfulness practice.  Find a mindfulness practice that works best for you. This may include one or more of the following:  Meditation. Meditation involves focusing your mind on a certain thought or activity.  Breathing awareness exercises. These help you to stay present by focusing on your breath.  Body scan. For this practice, you lie down and pay attention to each part of your body from head to toe. You can identify tension and soreness and intentionally relax parts of your body.  Yoga. Yoga involves stretching and breathing, and it can improve your ability to move and be flexible. It can also provide an experience of testing your body's limits, which can help you release stress.  Mindful eating. This way of eating involves focusing on the taste, texture, color, and smell of each bite of food. Because this slows down eating and helps you feel full sooner, it can be an important part of a weight-loss plan.  Find a podcast or recording that provides guidance for breathing awareness, body scan, or meditation exercises. You can listen to these any time when you have a free moment to rest without distractions.  Follow your treatment plan as told by your health care provider. This may include taking regular medicines and making changes to your diet or lifestyle as recommended.  How to practice mindfulness  To do a basic awareness exercise:  Find a comfortable place to sit.  Pay attention to the present moment. Observe your thoughts, feelings, and surroundings just as they are.  Avoid placing judgment on yourself, your feelings, or your surroundings. Make note of any judgment that comes up, and let it go.  Your mind may wander, and that is okay. Make note of when your thoughts drift, and return your attention to the present moment.  To do basic mindfulness meditation:  Find a comfortable place to sit. This may include a stable chair or a firm  floor cushion.  Sit upright with your back straight. Let your arms fall next to your side with your hands resting on your legs.  If sitting in a chair, rest your feet flat on the floor.  If sitting on a cushion, cross your legs in front of you.  Keep your head in a neutral position with your chin dropped slightly. Relax your jaw and rest the tip of your tongue on the roof of your mouth. Drop your gaze to the floor. You can close your eyes if you like.  Breathe normally and pay attention to your breath. Feel the air moving in and out of your nose. Feel your belly expanding and relaxing with each breath.  Your mind may wander, and that is okay. Make note of when your thoughts drift, and return your attention to your breath.  Avoid placing judgment on yourself, your feelings, or your surroundings. Make note of any judgment or feelings that come up, let them go, and bring your attention back to your breath.  When you are ready, lift your gaze or open your eyes. Pay attention to how your body feels after the meditation.  Where to find more information  You can find more information about MBSR from:  Your health care provider.  Community-based meditation centers or programs.  Programs offered near you.  Summary  Mindfulness-based stress reduction (MBSR) is a program that teaches you how to intentionally pay attention to the present moment. It is used with other treatments to help you cope better with daily stress, emotions, and pain.  MBSR focuses on developing self-awareness, which allows you to respond to life stress without judgment or negative emotions.  MBSR programs may involve learning different mindfulness practices, such as breathing exercises, meditation, yoga, body scan, or mindful eating. Find a mindfulness practice that works best for you, and set aside time for it on a regular basis.  This information is not intended to replace advice given to you by your health care provider. Make sure you discuss any  questions you have with your health care provider.  Document Revised: 02/22/2021 Document Reviewed: 04/26/2018  Elsevier Patient Education © 2021 [employer] Inc.

## 2022-09-30 NOTE — PROGRESS NOTES
The ABCs of the Annual Wellness Visit  Subsequent Medicare Wellness Visit    Chief Complaint   Patient presents with   • Medicare Wellness-subsequent      Subjective    History of Present Illness:  Jaleel Garcia is a 42 y.o. male who presents for a Subsequent Medicare Wellness Visit.    The following portions of the patient's history were reviewed and   updated as appropriate: allergies, current medications, past family history, past medical history, past social history, past surgical history and problem list.    Here with Priscilla from [facility].    Compared to one year ago, the patient feels his physical   health is better.    Compared to one year ago, the patient feels his mental   health is the same.    Recent Hospitalizations:  This patient has had a [facility] admission record on file within the last 365 days.    Current Medical Providers:  Patient Care Team:  Rico Monzon MD as PCP - General (Internal Medicine)    Outpatient Medications Prior to Visit   Medication Sig Dispense Refill   • Calcium Carbonate-Vitamin D (calcium-vitamin D) 500-200 MG-UNIT tablet per tablet Take 1 tablet by mouth Daily.     • cetirizine (zyrTEC) 10 MG tablet Take 10 mg by mouth Daily.     • desmopressin (DDAVP) 0.2 MG tablet Take 0.2 mg by mouth 2 (Two) Times a Day.     • divalproex (DEPAKOTE ER) 500 MG 24 hr tablet Take 1,000 mg by mouth Daily.     • levETIRAcetam (KEPPRA) 750 MG tablet Take 1 tab in am, and 2 tabs in pm for two weeks. Then take 2 tabs BID thereafter. 120 tablet 8   • levothyroxine (SYNTHROID, LEVOTHROID) 50 MCG tablet Take 50 mcg by mouth Every Morning.     • sertraline (ZOLOFT) 100 MG tablet Take 150 mg by mouth Daily.       No facility-administered medications prior to visit.       No opioid medication identified on active medication list. I have reviewed chart for other potential  high risk medication/s and harmful drug interactions in the elderly.          Aspirin is not on active medication  "list.  Aspirin use is not indicated based on review of current medical condition/s. Risk of harm outweighs potential benefits.  .    Patient Active Problem List   Diagnosis   • Seizure disorder (HCC)   • Other specified hypothyroidism   • Major depressive disorder, recurrent, in full remission (HCC)   • Allergic rhinitis, unspecified   • Intellectual disability   • Incontinence   • Polydipsia   • Epidermal cyst     Advance Care Planning  Advance Directive is on file.  ACP discussion was held with the patient during this visit. Patient has an advance directive in EMR which is still valid.           Objective    Vitals:    09/30/22 1453 09/30/22 1501   BP: 95/60 99/65   BP Location: Right arm Left arm   Pulse: 60    Temp: 97.1 °F (36.2 °C)    TempSrc: Temporal    SpO2: 98%    Weight: 83.6 kg (184 lb 6.4 oz)    Height: 170.2 cm (67\")      Estimated body mass index is 28.88 kg/m² as calculated from the following:    Height as of this encounter: 170.2 cm (67\").    Weight as of this encounter: 83.6 kg (184 lb 6.4 oz).    BMI is >= 25 and <30. (Overweight) The following options were offered after discussion;: exercise counseling/recommendations and nutrition counseling/recommendations      Does the patient have evidence of cognitive impairment? Yes    Physical Exam  Vitals reviewed.   Constitutional:       General: He is not in acute distress.     Appearance: Normal appearance. He is not ill-appearing, toxic-appearing or diaphoretic.   HENT:      Head: Normocephalic and atraumatic.      Right Ear: Tympanic membrane, ear canal and external ear normal.      Left Ear: Tympanic membrane, ear canal and external ear normal.      Mouth/Throat:      Mouth: Mucous membranes are moist.      Pharynx: Oropharynx is clear. No oropharyngeal exudate or posterior oropharyngeal erythema.   Eyes:      Conjunctiva/sclera: Conjunctivae normal.   Cardiovascular:      Rate and Rhythm: Normal rate and regular rhythm.      Pulses: Normal pulses. "      Heart sounds: No murmur heard.  Pulmonary:      Effort: Pulmonary effort is normal. No respiratory distress.      Breath sounds: Normal breath sounds. No stridor. No wheezing, rhonchi or rales.   Chest:      Chest wall: No tenderness.   Abdominal:      General: Abdomen is flat.      Palpations: Abdomen is soft. There is no mass.      Tenderness: There is no abdominal tenderness.   Musculoskeletal:      Right lower leg: No edema.      Left lower leg: No edema.   Skin:     General: Skin is warm and dry.   Neurological:      Mental Status: He is alert. Mental status is at baseline.               HEALTH RISK ASSESSMENT    Smoking Status:  Social History     Tobacco Use   Smoking Status Never Smoker   Smokeless Tobacco Never Used     Alcohol Consumption:  Social History     Substance and Sexual Activity   Alcohol Use Never     Fall Risk Screen:    [facility] Fall Risk Assessment was completed, and patient is at MODERATE risk for falls. Assessment completed on:9/30/2022    Depression Screening:  PHQ-2/PHQ-9 Depression Screening 9/30/2022   Little Interest or Pleasure in Doing Things 0-->not at all   Feeling Down, Depressed or Hopeless 0-->not at all   PHQ-9: Brief Depression Severity Measure Score 0       Health Habits and Functional and Cognitive Screening:  Functional & Cognitive Status 9/30/2022   Do you have difficulty preparing food and eating? Yes   Do you have difficulty bathing yourself, getting dressed or grooming yourself? No   Do you have difficulty using the toilet? Yes   Do you have difficulty moving around from place to place? Yes   Do you have trouble with steps or getting out of a bed or a chair? Yes   Current Diet Well Balanced Diet   Dental Exam Up to date   Eye Exam Up to date   Exercise (times per week) 0 times per week   Current Exercises Include No Regular Exercise   Do you need help using the phone?  Yes   Are you deaf or do you have serious difficulty hearing?  No   Do you need help with  transportation? No   Do you need help shopping? No   Do you need help preparing meals?  No   Do you need help with housework?  No   Do you need help with laundry? No   Do you need help taking your medications? No   Do you need help managing money? No   Do you ever drive or ride in a car without wearing a seat belt? No   Have you felt unusual stress, anger or loneliness in the last month? No   Who do you live with? Community   If you need help, do you have trouble finding someone available to you? No   Have you been bothered in the last four weeks by sexual problems? No   Do you have difficulty concentrating, remembering or making decisions? No       Age-appropriate Screening Schedule:  Refer to the list below for future screening recommendations based on patient's age, sex and/or medical conditions. Orders for these recommended tests are listed in the plan section. The patient has been provided with a written plan.    Health Maintenance   Topic Date Due   • INFLUENZA VACCINE  08/01/2022   • TDAP/TD VACCINES (2 - Td or Tdap) 10/18/2027              Assessment & Plan   CMS Preventative Services Quick Reference  Risk Factors Identified During Encounter  Inactivity/Sedentary  Obesity/Overweight    Intellectual Disability  The above risks/problems have been discussed with the patient.  Follow up actions/plans if indicated are seen below in the Assessment/Plan Section.  Pertinent information has been shared with the patient in the After Visit Summary.    Diagnoses and all orders for this visit:    1. Medicare annual wellness visit, subsequent (Primary)  -     CBC & Differential  -     Comprehensive Metabolic Panel  -     TSH  -     Lipid Panel    2. Hyponatremia  Comments:  -Will cautiously continue 1,500 ml (50 oz) fluid restriction  Orders:  -     Comprehensive Metabolic Panel    3. Seizure disorder (HCC)  Comments:  -Keppra increased due to increased seizure activity  -Checking Na today    4. Other specified  hypothyroidism  -     TSH    5. Intellectual disability      Health Maintenance:  -nutritional counseling on the components of a heart healthy diet  -exercise counseling, recommending at least 2.5 hours of moderate exercise weekly  -[facility] unable to get flu shot without consent of guardian - I encouraged her to reach out to guardian and ask if she would like for Jaleel to receive a flu shot    Follow Up:   Return in about 3 months (around 12/30/2022) for Hyponatremia.     An After Visit Summary and PPPS were made available to the patient.

## 2022-09-30 NOTE — TELEPHONE ENCOUNTER
I will increase levetiracetam to 750 mg in the morning and 1500 mg in the evening for 2 weeks and then 1500 mg twice a day.      Pharmacy needed sig updated to match dx paper filled out in office.

## 2022-10-01 ENCOUNTER — APPOINTMENT (OUTPATIENT)
Dept: CT IMAGING | Facility: HOSPITAL | Age: 42
End: 2022-10-01

## 2022-10-01 VITALS
DIASTOLIC BLOOD PRESSURE: 68 MMHG | OXYGEN SATURATION: 93 % | SYSTOLIC BLOOD PRESSURE: 109 MMHG | BODY MASS INDEX: 31.6 KG/M2 | HEART RATE: 78 BPM | HEIGHT: 66 IN | WEIGHT: 196.65 LBS | TEMPERATURE: 97.6 F | RESPIRATION RATE: 18 BRPM

## 2022-10-01 LAB
ANION GAP SERPL CALCULATED.3IONS-SCNC: 9 MMOL/L (ref 5–15)
BILIRUB UR QL STRIP: NEGATIVE
BUN SERPL-MCNC: 22 MG/DL (ref 6–20)
BUN/CREAT SERPL: 27.5 (ref 7–25)
CALCIUM SPEC-SCNC: 9.5 MG/DL (ref 8.6–10.5)
CHLORIDE SERPL-SCNC: 98 MMOL/L (ref 98–107)
CK SERPL-CCNC: 63 U/L (ref 20–200)
CLARITY UR: CLEAR
CO2 SERPL-SCNC: 29 MMOL/L (ref 22–29)
COLOR UR: YELLOW
CREAT SERPL-MCNC: 0.8 MG/DL (ref 0.76–1.27)
EGFRCR SERPLBLD CKD-EPI 2021: 114 ML/MIN/1.73
GLUCOSE SERPL-MCNC: 94 MG/DL (ref 65–99)
GLUCOSE UR STRIP-MCNC: NEGATIVE MG/DL
HGB UR QL STRIP.AUTO: NEGATIVE
KETONES UR QL STRIP: ABNORMAL
LEUKOCYTE ESTERASE UR QL STRIP.AUTO: NEGATIVE
NITRITE UR QL STRIP: NEGATIVE
PH UR STRIP.AUTO: 6 [PH] (ref 5–8)
POTASSIUM SERPL-SCNC: 4.2 MMOL/L (ref 3.5–5.2)
PROT UR QL STRIP: NEGATIVE
SODIUM SERPL-SCNC: 136 MMOL/L (ref 136–145)
SP GR UR STRIP: >1.03 (ref 1–1.03)
UROBILINOGEN UR QL STRIP: ABNORMAL

## 2022-10-01 PROCEDURE — 81003 URINALYSIS AUTO W/O SCOPE: CPT | Performed by: EMERGENCY MEDICINE

## 2022-10-01 PROCEDURE — 70450 CT HEAD/BRAIN W/O DYE: CPT

## 2022-10-01 RX ADMIN — SODIUM CHLORIDE 1000 ML: 9 INJECTION, SOLUTION INTRAVENOUS at 00:00

## 2022-10-01 NOTE — ED PROVIDER NOTES
Time: 11:43 PM EDT  Arrived by: private car  Chief Complaint: seizures  History provided by: mother  History is limited by: MR    History of Present Illness:  Patient is a 42 y.o. year old male who presents to the emergency department with seizures.    Patient is accompanied by his mother. She provided history for him. She states he has been having several seizures today, and is now more somnolent than his baseline. She states the patient has a history of seizures. She has noticed his right ear seems to have been bothering him. He stays in a nursing facility for assisted living, but his mother cannot further specify diagnoses regarding his past medical history.          Similar Symptoms Previously: Yes  Recently seen: No      Patient Care Team  Primary Care Provider: Rico Monzon MD    Past Medical History:     No Known Allergies  Past Medical History:   Diagnosis Date   • Renal disorder    • Seizures (HCC)      Past Surgical History:   Procedure Laterality Date   • KIDNEY SURGERY       History reviewed. No pertinent family history.    Home Medications:  Prior to Admission medications    Medication Sig Start Date End Date Taking? Authorizing Provider   Calcium Carbonate-Vitamin D (calcium-vitamin D) 500-200 MG-UNIT tablet per tablet Take 1 tablet by mouth Daily.    Laurel Salazar MD   cetirizine (zyrTEC) 10 MG tablet Take 10 mg by mouth Daily.    Laurel Salazar MD   desmopressin (DDAVP) 0.2 MG tablet Take 0.2 mg by mouth 2 (Two) Times a Day.    Laurel Salazar MD   divalproex (DEPAKOTE ER) 500 MG 24 hr tablet Take 1,000 mg by mouth Daily.    Laurel Salazar MD   levETIRAcetam (KEPPRA) 750 MG tablet Take 1 tab in am, and 2 tabs in pm for two weeks. Then take 2 tabs BID thereafter. 9/30/22   Bruce Ponce MD   levothyroxine (SYNTHROID, LEVOTHROID) 50 MCG tablet Take 50 mcg by mouth Every Morning.    Laurel Salazar MD   sertraline (ZOLOFT) 100 MG tablet Take 150 mg by  "mouth Daily.    ProviderLaurel MD   levETIRAcetam (KEPPRA) 750 MG tablet Take 750 mg by mouth 2 (Two) Times a Day.  9/30/22  Laurel Salazar MD   levETIRAcetam (KEPPRA) 750 MG tablet Take 2 tablets twice a day. 9/30/22 9/30/22  Bruce Ponce MD        Social History:   Social History     Tobacco Use   • Smoking status: Never Smoker   • Smokeless tobacco: Never Used   Vaping Use   • Vaping Use: Never used   Substance Use Topics   • Alcohol use: Never     Recent travel: no     Review of Systems:  Review of Systems   Unable to perform ROS: Patient nonverbal        Physical Exam:  /68   Pulse 78   Temp 97.6 °F (36.4 °C) (Oral)   Resp 18   Ht 167.6 cm (66\")   Wt 89.2 kg (196 lb 10.4 oz)   SpO2 93%   BMI 31.74 kg/m²     Physical Exam  Vitals and nursing note reviewed.   Constitutional:       General: He is not in acute distress.     Appearance: Normal appearance. He is not toxic-appearing.      Comments: Nonverbal   HENT:      Head: Normocephalic and atraumatic.      Jaw: There is normal jaw occlusion.      Mouth/Throat:      Mouth: Mucous membranes are dry.   Eyes:      General: Lids are normal.      Extraocular Movements: Extraocular movements intact.      Conjunctiva/sclera: Conjunctivae normal.      Pupils: Pupils are equal, round, and reactive to light.   Cardiovascular:      Rate and Rhythm: Normal rate and regular rhythm.      Pulses: Normal pulses.      Heart sounds: Normal heart sounds.   Pulmonary:      Effort: Pulmonary effort is normal. No respiratory distress.      Breath sounds: Normal breath sounds. No wheezing or rhonchi.   Abdominal:      General: Abdomen is flat.      Palpations: Abdomen is soft.      Tenderness: There is no abdominal tenderness. There is no guarding or rebound.   Musculoskeletal:         General: Normal range of motion.      Cervical back: Normal range of motion and neck supple.      Right lower leg: No edema.      Left lower leg: No edema.   Skin:   "   General: Skin is warm and dry.   Neurological:      Comments: Physically appears to have MR/CP   Psychiatric:         Mood and Affect: Mood normal.                Medications in the Emergency Department:  Medications   sodium chloride 0.9 % flush 10 mL (has no administration in time range)   sodium chloride 0.9 % bolus 1,000 mL (0 mL Intravenous Stopped 10/1/22 0030)        Labs  Lab Results (last 24 hours)     Procedure Component Value Units Date/Time    CBC & Differential [[id number]]  (Abnormal) Collected: 09/30/22 1549    Specimen: Blood from Arm, Right Updated: 09/30/22 2257    Narrative:      The following orders were created for panel order CBC & Differential.  Procedure                               Abnormality         Status                     ---------                               -----------         ------                     CBC Auto Differential[[id number]]        Abnormal            Final result                 Please view results for these tests on the individual orders.    Comprehensive Metabolic Panel [[id number]]  (Abnormal) Collected: 09/30/22 1549    Specimen: Blood from Arm, Right Updated: 09/30/22 2311     Glucose 101 mg/dL      BUN 21 mg/dL      Creatinine 0.75 mg/dL      Sodium 135 mmol/L      Potassium 4.3 mmol/L      Chloride 98 mmol/L      CO2 28.0 mmol/L      Calcium 9.5 mg/dL      Total Protein 6.3 g/dL      Albumin 4.20 g/dL      ALT (SGPT) 9 U/L      AST (SGOT) 10 U/L      Alkaline Phosphatase 50 U/L      Total Bilirubin 0.3 mg/dL      Globulin 2.1 gm/dL      A/G Ratio 2.0 g/dL      BUN/Creatinine Ratio 28.0     Anion Gap 9.0 mmol/L      eGFR 116.3 mL/min/1.73      Comment: National Kidney Foundation and American Society of Nephrology (ASN) Task Force recommended calculation based on the Chronic Kidney Disease Epidemiology Collaboration (CKD-EPI) equation refit without adjustment for race.       Narrative:      GFR Normal >60  Chronic Kidney Disease <60  Kidney Failure <15      TSH  [[id number]]  (Normal) Collected: 09/30/22 1549    Specimen: Blood from Arm, Right Updated: 09/30/22 2318     TSH 0.933 uIU/mL     Lipid Panel [[id number]]  (Abnormal) Collected: 09/30/22 1549    Specimen: Blood from Arm, Right Updated: 09/30/22 2311     Total Cholesterol 121 mg/dL      Triglycerides 72 mg/dL      HDL Cholesterol 39 mg/dL      LDL Cholesterol  67 mg/dL      VLDL Cholesterol 15 mg/dL      LDL/HDL Ratio 1.73    Narrative:      Cholesterol Reference Ranges  (U.S. Department of Health and Human Services ATP III Classifications)    Desirable          <200 mg/dL  Borderline High    200-239 mg/dL  High Risk          >240 mg/dL      Triglyceride Reference Ranges  (U.S. Department of Health and Human Services ATP III Classifications)    Normal           <150 mg/dL  Borderline High  150-199 mg/dL  High             200-499 mg/dL  Very High        >500 mg/dL    HDL Reference Ranges  (U.S. Department of Health and Human Services ATP III Classifications)    Low     <40 mg/dl (major risk factor for CHD)  High    >60 mg/dl ('negative' risk factor for CHD)        LDL Reference Ranges  (U.S. Department of Health and Human Services ATP III Classifications)    Optimal          <100 mg/dL  Near Optimal     100-129 mg/dL  Borderline High  130-159 mg/dL  High             160-189 mg/dL  Very High        >189 mg/dL    CBC Auto Differential [[id number]]  (Abnormal) Collected: 09/30/22 1549    Specimen: Blood from Arm, Right Updated: 09/30/22 2257     WBC 6.99 10*3/mm3      RBC 4.59 10*6/mm3      Hemoglobin 13.7 g/dL      Hematocrit 42.0 %      MCV 91.5 fL      MCH 29.8 pg      MCHC 32.6 g/dL      RDW 12.8 %      RDW-SD 42.9 fl      MPV 12.2 fL      Platelets 154 10*3/mm3      Neutrophil % 46.2 %      Lymphocyte % 47.1 %      Monocyte % 6.4 %      Eosinophil % 0.1 %      Basophil % 0.1 %      Immature Grans % 0.1 %      Neutrophils, Absolute 3.22 10*3/mm3      Lymphocytes, Absolute 3.29 10*3/mm3      Monocytes, Absolute 0.45  10*3/mm3      Eosinophils, Absolute 0.01 10*3/mm3      Basophils, Absolute 0.01 10*3/mm3      Immature Grans, Absolute 0.01 10*3/mm3      nRBC 0.0 /100 WBC     CBC & Differential [[id number]]  (Abnormal) Collected: 09/30/22 2109    Specimen: Blood Updated: 09/30/22 2133    Narrative:      The following orders were created for panel order CBC & Differential.  Procedure                               Abnormality         Status                     ---------                               -----------         ------                     CBC Auto Differential[[id number]]        Abnormal            Final result                 Please view results for these tests on the individual orders.    Valproic Acid Level, Total [[id number]]  (Normal) Collected: 09/30/22 2109    Specimen: Blood Updated: 09/30/22 2203     Valproic Acid 82.9 mcg/mL     Narrative:      Therapeutic Ranges for Valproic Acid    Epilepsy:       mcg/ml  Bipolar/Kaylen  up to 125 mcg/ml      CBC Auto Differential [[id number]]  (Abnormal) Collected: 09/30/22 2109    Specimen: Blood Updated: 09/30/22 2133     WBC 6.49 10*3/mm3      RBC 4.70 10*6/mm3      Hemoglobin 14.2 g/dL      Hematocrit 41.8 %      MCV 88.9 fL      MCH 30.2 pg      MCHC 34.0 g/dL      RDW 12.7 %      RDW-SD 41.7 fl      MPV 12.2 fL      Platelets 158 10*3/mm3      Neutrophil % 38.1 %      Lymphocyte % 53.3 %      Monocyte % 7.9 %      Eosinophil % 0.3 %      Basophil % 0.2 %      Immature Grans % 0.2 %      Neutrophils, Absolute 2.48 10*3/mm3      Lymphocytes, Absolute 3.46 10*3/mm3      Monocytes, Absolute 0.51 10*3/mm3      Eosinophils, Absolute 0.02 10*3/mm3      Basophils, Absolute 0.01 10*3/mm3      Immature Grans, Absolute 0.01 10*3/mm3      nRBC 0.0 /100 WBC     Basic Metabolic Panel [[id number]]  (Abnormal) Collected: 09/30/22 2109    Specimen: Blood Updated: 10/01/22 0010     Glucose 94 mg/dL      BUN 22 mg/dL      Creatinine 0.80 mg/dL      Sodium 136 mmol/L      Potassium 4.2 mmol/L       Comment: Slight hemolysis detected by analyzer. Results may be affected.        Chloride 98 mmol/L      CO2 29.0 mmol/L      Calcium 9.5 mg/dL      BUN/Creatinine Ratio 27.5     Anion Gap 9.0 mmol/L      eGFR 114.0 mL/min/1.73      Comment: National Kidney Foundation and American Society of Nephrology (ASN) Task Force recommended calculation based on the Chronic Kidney Disease Epidemiology Collaboration (CKD-EPI) equation refit without adjustment for race.       Narrative:      GFR Normal >60  Chronic Kidney Disease <60  Kidney Failure <15      CK [[id number]]  (Normal) Collected: 09/30/22 2109    Specimen: Blood Updated: 10/01/22 0010     Creatine Kinase 63 U/L     Urinalysis With Microscopic If Indicated (No Culture) - Urine, Clean Catch [[id number]]  (Abnormal) Collected: 10/01/22 0120    Specimen: Urine, Clean Catch Updated: 10/01/22 0136     Color, UA Yellow     Appearance, UA Clear     pH, UA 6.0     Specific Gravity, UA >1.030     Glucose, UA Negative     Ketones, UA Trace     Bilirubin, UA Negative     Blood, UA Negative     Protein, UA Negative     Leuk Esterase, UA Negative     Nitrite, UA Negative     Urobilinogen, UA 0.2 E.U./dL    Narrative:      Urine microscopic not indicated.           Imaging:  CT Head Without Contrast    Result Date: 10/1/2022  PROCEDURE: CT HEAD WO CONTRAST  COMPARISON: 12/26/2021.  INDICATIONS: SEIZURES FOR 2 DAYS.  PROTOCOL:   Standard imaging protocol performed.    RADIATION:   DLP: 2,553.2 mGy*cm   MA and/or KV was adjusted to minimize radiation dose.    TECHNIQUE: After obtaining the patient's consent, 266 CT images were obtained without non-ionic intravenous contrast material.  The study is motion-limited.  Some of the images were repeated due to motion artifact.  The initial acquisition was made with axial technique parent the 2nd acquisition was made with helical technique.  DISCUSSION:   A routine nonenhanced head CT was performed.  No acute brain abnormality is  identified.  No acute intracranial hemorrhage.  No acute infarct is seen.  No acute skull fracture.  No midline shift or acute intracranial mass effect is seen.  The ventricular size and configuration are within normal limits.  The ventricles and extra-axial spaces are prominent for the patient's age, similar to that seen on the 12/26/2021 study.  The study is limited by motion artifact.  Prominent retrocerebellar cystic structures are seen, as before, and probably represent benign arachnoid cysts.  There is chronic rightward nasoseptal deviation.  Grossly, no significant acute findings are seen with regard to the imaged orbits, paranasal sinuses, or middle ear clefts. No significant acute findings are seen with regard to the imaged orbits, paranasal sinuses, or middle ear clefts.       No acute brain abnormality is seen. Specifically, no acute intracranial hemorrhage, no acute infarct, no significant intracranial mass lesion, and no acute intracranial mass effect are appreciated.  The study is motion-limited.    Please note that portions of this note were completed with a voice recognition program.  HUNG SPENCER JR, MD       Electronically Signed and Approved By: HUNG SPENCER JR, MD on 10/01/2022 at 1:41                Procedures:  Procedures    Progress                            Medical Decision Making:  MDM  Number of Diagnoses or Management Options  Dehydration: new and requires workup  Seizure (HCC): established and worsening     Amount and/or Complexity of Data Reviewed  Obtain history from someone other than the patient: yes  Review and summarize past medical records: yes  Independent visualization of images, tracings, or specimens: yes    Risk of Complications, Morbidity, and/or Mortality  Presenting problems: moderate  Management options: low    Patient Progress  Patient progress: improved       Final diagnoses:   Seizure (HCC)   Dehydration        Disposition:  ED Disposition     ED Disposition    Discharge    Condition   Stable    Comment   --             This medical record created using voice recognition software.    Documentation assistance provided by Dave Apodaca MD acting as scribe for Dave Apodaca MD.  Information recorded by the scribe was done at my direction and has been verified and validated by me.       Heather Montaño  09/30/22 5291       Heather Montaño  10/01/22 0024       Dave Apodaca MD  10/01/22 0211

## 2022-10-03 ENCOUNTER — TELEPHONE (OUTPATIENT)
Dept: NEUROLOGY | Facility: CLINIC | Age: 42
End: 2022-10-03

## 2022-10-03 ENCOUNTER — TELEPHONE (OUTPATIENT)
Dept: INTERNAL MEDICINE | Facility: CLINIC | Age: 42
End: 2022-10-03

## 2022-10-03 NOTE — TELEPHONE ENCOUNTER
----- Message from Rico Monzon MD sent at 10/1/2022  7:23 AM EDT -----  Sodium is stable compared to previous labs.  Once again, it is just below the normal range.  I don't think that it is low enough to explain his increased seizures.  We will continue his fluid restriction at the current level.    Labs otherwise notable for the following: HDL cholesterol is mildly low.  This isn't usually treated with medicines.  CBC shows no significant abnormalities in terms of white blood cells, red blood cells or platelets.    In addition to the above, TSH is within normal limits.  We will continue his levothyroxine at its current dose.

## 2022-10-03 NOTE — TELEPHONE ENCOUNTER
Provider: PJ  Caller: LARISSA LEMA  Relationship to Patient: [facility]  Phone Number: [phone]  Reason for Call: LARISSA CALLED FROM [facility] CALLED RE: PATIENT'S LAST APPT ON 9/30/22 DR. OLIVA SAID TO INCREASE HIS KEPPRA   IN THE A.M. AND 1500 IN THE PM FOR TWO WEEKS AND THEN 1500 TWICE A DAY AFTER THAT, BUT THE PATIENT HAS ALREADY BEEN ON 1500 TWICE A DAY SINCE 2020.  ASHIA MANJARREZ WROTE FOR THIS DOSAGE BACK IN 2020 AND HE HAS BEEN ON THAT SINCE THEN.    PLEASE REVIEW AND ADVISE     THANK YOU

## 2022-10-03 NOTE — TELEPHONE ENCOUNTER
Patient's mother is aware of results per SHERRY.     Mother is concerned due to being taken to the ER Friday night, was extremely concerned with fluid intake.     Also had CT scan done. Has follow up scheduled for 10/6/2022 for hospital follow up.

## 2022-10-06 ENCOUNTER — OFFICE VISIT (OUTPATIENT)
Dept: INTERNAL MEDICINE | Facility: CLINIC | Age: 42
End: 2022-10-06

## 2022-10-06 VITALS
HEIGHT: 66 IN | DIASTOLIC BLOOD PRESSURE: 67 MMHG | TEMPERATURE: 97.9 F | OXYGEN SATURATION: 97 % | BODY MASS INDEX: 31.6 KG/M2 | HEART RATE: 63 BPM | WEIGHT: 196.6 LBS | SYSTOLIC BLOOD PRESSURE: 108 MMHG

## 2022-10-06 DIAGNOSIS — Z23 NEED FOR IMMUNIZATION AGAINST INFLUENZA: ICD-10-CM

## 2022-10-06 DIAGNOSIS — Q76.7 CONGENITAL ABNORMALITY OF SHAPE OF STERNUM: ICD-10-CM

## 2022-10-06 DIAGNOSIS — F70 MILD INTELLECTUAL DISABILITY: ICD-10-CM

## 2022-10-06 DIAGNOSIS — E87.1 HYPONATREMIA: ICD-10-CM

## 2022-10-06 DIAGNOSIS — L72.0 EPIDERMOID CYST OF SKIN OF BACK: ICD-10-CM

## 2022-10-06 DIAGNOSIS — F32.A DEPRESSION, UNSPECIFIED DEPRESSION TYPE: ICD-10-CM

## 2022-10-06 DIAGNOSIS — G40.909 SEIZURE DISORDER: Primary | ICD-10-CM

## 2022-10-06 DIAGNOSIS — E03.8 OTHER SPECIFIED HYPOTHYROIDISM: ICD-10-CM

## 2022-10-06 LAB
OSMOLALITY UR: 866 MOSM/KG
SODIUM UR-SCNC: 173 MMOL/L

## 2022-10-06 PROCEDURE — 84300 ASSAY OF URINE SODIUM: CPT | Performed by: STUDENT IN AN ORGANIZED HEALTH CARE EDUCATION/TRAINING PROGRAM

## 2022-10-06 PROCEDURE — 80048 BASIC METABOLIC PNL TOTAL CA: CPT | Performed by: STUDENT IN AN ORGANIZED HEALTH CARE EDUCATION/TRAINING PROGRAM

## 2022-10-06 PROCEDURE — 83935 ASSAY OF URINE OSMOLALITY: CPT | Performed by: STUDENT IN AN ORGANIZED HEALTH CARE EDUCATION/TRAINING PROGRAM

## 2022-10-06 PROCEDURE — 90686 IIV4 VACC NO PRSV 0.5 ML IM: CPT | Performed by: STUDENT IN AN ORGANIZED HEALTH CARE EDUCATION/TRAINING PROGRAM

## 2022-10-06 PROCEDURE — G0008 ADMIN INFLUENZA VIRUS VAC: HCPCS | Performed by: STUDENT IN AN ORGANIZED HEALTH CARE EDUCATION/TRAINING PROGRAM

## 2022-10-06 PROCEDURE — 99214 OFFICE O/P EST MOD 30 MIN: CPT | Performed by: STUDENT IN AN ORGANIZED HEALTH CARE EDUCATION/TRAINING PROGRAM

## 2022-10-06 RX ORDER — ESCITALOPRAM OXALATE 10 MG/1
10 TABLET ORAL DAILY
Qty: 90 TABLET | Refills: 3 | Status: SHIPPED | OUTPATIENT
Start: 2022-11-02

## 2022-10-06 RX ORDER — SERTRALINE HYDROCHLORIDE 100 MG/1
TABLET, FILM COATED ORAL
Qty: 30 TABLET | Refills: 0 | Status: SHIPPED | OUTPATIENT
Start: 2022-10-06 | End: 2023-02-16

## 2022-10-06 NOTE — PROGRESS NOTES
Chief Complaint  Hospital Follow Up Visit, knot on chest, knot on back , and Cerumen Impaction (Right ear impaction )    Subjective          Jaleel Garcia presents to [facility]  History of Present Illness    Here with mother and Priscilla from [facility].    Seen Friday night in the ER for another seizure episode.  No further seizure since Friday.  Initially instructed to increase Keppra to 750 mg in the a.m. and 1500 in the p.m. for 2 weeks and then 1500 twice a day.  However, [facility] contacted neurology and informed him that he has been on 1500 mg twice daily since 2020.  Neurology informed him to continue Keppra at that dosage for now.  Next appointment with neurology scheduled for June however they are working on trying to get a sooner appointment.    Jaleel's mother reports concerns about dehydration due to him being on a 50 ounce a day fluid restriction.  When asked if when he was on a 72 ounce fluid restriction that if it was possible that he was drinking more fluids, Priscilla reports that it is possible.    Of note, Jaleel's mother also would like to have the mass on his back removed and has questions about a hard spot at the bottom part of his breastbone.      Current Outpatient Medications   Medication Instructions   • Calcium Carbonate-Vitamin D (calcium-vitamin D) 500-200 MG-UNIT tablet per tablet 1 tablet, Oral, Daily   • cetirizine (ZYRTEC) 10 mg, Oral, Daily   • desmopressin (DDAVP) 0.2 mg, Oral, 2 Times Daily   • divalproex (DEPAKOTE ER) 1,000 mg, Oral, Daily   • [START ON 11/2/2022] escitalopram (LEXAPRO) 10 mg, Oral, Daily, To start after titrates off sertraline.   • levETIRAcetam (KEPPRA) 750 MG tablet Take 1 tab in am, and 2 tabs in pm for two weeks. Then take 2 tabs BID thereafter.   • levothyroxine (SYNTHROID, LEVOTHROID) 50 mcg, Oral, Every Early Morning   • sertraline (ZOLOFT) 100 MG tablet Take 100 mg PO daily for next two weeks.  Then, 50  "mg PO daily the following two weeks.  Then stop this medicine and start escitalopram       The following portions of the patient's history were reviewed and updated as appropriate: allergies, current medications, past family history, past medical history, past social history, past surgical history, and problem list.    Objective   Vital Signs:   /67 (BP Location: Left arm, Patient Position: Sitting, Cuff Size: Adult)   Pulse 63   Temp 97.9 °F (36.6 °C) (Temporal)   Ht 167.6 cm (66\")   Wt 89.2 kg (196 lb 9.6 oz)   SpO2 97%   BMI 31.73 kg/m²     Wt Readings from Last 3 Encounters:   10/06/22 89.2 kg (196 lb 9.6 oz)   09/30/22 89.2 kg (196 lb 10.4 oz)   09/30/22 83.6 kg (184 lb 6.4 oz)     BP Readings from Last 3 Encounters:   10/06/22 108/67   10/01/22 109/68   09/30/22 99/65     Physical Exam  Vitals reviewed.   Constitutional:       Appearance: Normal appearance.   HENT:      Head: Normocephalic and atraumatic.      Right Ear: Tympanic membrane, ear canal and external ear normal.      Left Ear: Tympanic membrane, ear canal and external ear normal.   Eyes:      Conjunctiva/sclera: Conjunctivae normal.   Cardiovascular:      Rate and Rhythm: Normal rate and regular rhythm.      Pulses: Normal pulses.      Heart sounds: Normal heart sounds. No murmur heard.  Pulmonary:      Effort: Pulmonary effort is normal.      Breath sounds: Normal breath sounds. No wheezing.   Chest:      Comments: Hard protuberance noted at location of xyphoid process.  Abdominal:      General: Abdomen is flat.      Palpations: Abdomen is soft.      Tenderness: There is no abdominal tenderness.   Musculoskeletal:      Right lower leg: No edema.      Left lower leg: No edema.   Skin:     General: Skin is warm and dry.      Comments: Cyst noted, middle of back, la nena. 1 cm diameter   Neurological:      General: No focal deficit present.      Mental Status: He is alert. Mental status is at baseline.   Psychiatric:         Behavior: " Behavior normal.         Thought Content: Thought content normal.         Judgment: Judgment normal.       Result Review :   The following data was reviewed by: Rico Monzon MD on 10/06/2022:  Common labs    Common Labs 7/5/22 9/8/22 9/30/22 9/30/22 9/30/22 9/30/22 9/30/22      1549 1549 1549 2109 2109   Glucose 78 71   101 (A)  94   BUN 19 13   21 (A)  22 (A)   Creatinine 0.63 (A) 0.58 (A)   0.75 (A)  0.80   Sodium 139 135 (A)   135 (A)  136   Potassium 4.4 4.9   4.3  4.2   Chloride 104 98   98  98   Calcium 9.2 9.2   9.5  9.5   Albumin     4.20     Total Bilirubin     0.3     Alkaline Phosphatase     50     AST (SGOT)     10     ALT (SGPT)     9     WBC   6.99   6.49    Hemoglobin   13.7   14.2    Hematocrit   42.0   41.8    Platelets   154   158    Total Cholesterol    121      Triglycerides    72      HDL Cholesterol    39 (A)      LDL Cholesterol     67      (A) Abnormal value       Comments are available for some flowsheets but are not being displayed.                  Lab Results   Component Value Date    COVID19 NOT DETECTED 06/04/2020    INR 1.10 (L) 12/26/2021    BILIRUBINUR Negative 10/01/2022       Procedures        Assessment and Plan    Diagnoses and all orders for this visit:    1. Seizure disorder (HCC) (Primary)    2. Hyponatremia  -     Basic metabolic panel  -     Osmolality, Urine - Urine, Clean Catch  -     Sodium, Urine, Random - Urine, Clean Catch  -     Cortisol - AM; Future    3. Mild intellectual disability    4. Other specified hypothyroidism    5. 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 Epidermoid cyst of skin of back  -     Ambulatory Referral to General Surgery    8. Depression, unspecified depression type  -     sertraline (ZOLOFT) 100 MG tablet; Take 100 mg PO daily for next two weeks.  Then, 50 mg PO daily the following two weeks.  Then stop this  medicine and start escitalopram  Dispense: 30 tablet; Refill: 0  -     escitalopram (Lexapro) 10 MG tablet; Take 1 tablet by mouth Daily. To start after titrates off sertraline.  Dispense: 90 tablet; Refill: 3      Hyponatremia:  -Overall I suspect that he is drinking more fluids than expected whenever he had worsening hyponatremia  -With that said, I think it is quite reasonable to explore other potential etiologies  -Firstly I will titrate off Zoloft and replace with Lexapro; we will titrate off Zoloft over the course of 4 weeks, reducing to 100 mg daily for the first 2 weeks then 50 mg for 2 weeks then stop  -I will also obtain an a.m. cortisol  -I have obtained urine labs today as well  -Sodium is stable at his follow-up with me December 30 and rest of work-up was reassuring, I will cautiously attempt a return to his 72 ounces a day fluid restriction  -The meantime, we will continue the 50 ounce daily fluid restriction    Immunizations:  -Discussed risks/benefits to vaccination, reviewed components of the vaccine, discussed VIS, discussed informed consent, informed consent obtained. Patient/Parent was allowed to accept or refuse vaccine. Questions answered to satisfactory state of patient/parent. We reviewed typical age appropriate and seasonally appropriate vaccinations. Reviewed immunization history and updated state vaccination form as needed. Patient/Parent was counseled on the above vaccines.      Medications Discontinued During This Encounter   Medication Reason   • sertraline (ZOLOFT) 100 MG tablet Reorder          Follow Up   Return if symptoms worsen or fail to improve.  Patient was given instructions and counseling regarding his condition or for health maintenance advice. Please see specific information pulled into the AVS if appropriate.       Rico Monzon MD  10/06/22  18:11 EDT

## 2022-10-07 ENCOUNTER — TELEPHONE (OUTPATIENT)
Dept: INTERNAL MEDICINE | Facility: CLINIC | Age: 42
End: 2022-10-07

## 2022-10-07 LAB
ANION GAP SERPL CALCULATED.3IONS-SCNC: 9.5 MMOL/L (ref 5–15)
BUN SERPL-MCNC: 14 MG/DL (ref 6–20)
BUN/CREAT SERPL: 23.7 (ref 7–25)
CALCIUM SPEC-SCNC: 9.1 MG/DL (ref 8.6–10.5)
CHLORIDE SERPL-SCNC: 98 MMOL/L (ref 98–107)
CO2 SERPL-SCNC: 27.5 MMOL/L (ref 22–29)
CREAT SERPL-MCNC: 0.59 MG/DL (ref 0.76–1.27)
EGFRCR SERPLBLD CKD-EPI 2021: 125 ML/MIN/1.73
GLUCOSE SERPL-MCNC: 73 MG/DL (ref 65–99)
POTASSIUM SERPL-SCNC: 4.7 MMOL/L (ref 3.5–5.2)
SODIUM SERPL-SCNC: 135 MMOL/L (ref 136–145)

## 2022-10-07 NOTE — TELEPHONE ENCOUNTER
----- Message from Rico Monzon MD sent at 10/7/2022  7:42 AM EDT -----  BMP notable for mildly low sodium (135, which is just below the normal range).  This is similar to several of his recent measurements.  I would like to continue with the plan to taper off sertraline, obtain the morning cortisol test, and continue with his current fluid restriction.

## 2022-10-07 NOTE — TELEPHONE ENCOUNTER
OKAY FOR HUB TO READ/ADVISE     ----- Message from Rico Monzon MD sent at 10/7/2022  7:42 AM EDT -----  BMP notable for mildly low sodium (135, which is just below the normal range).  This is similar to several of his recent measurements.  I would like to continue with the plan to taper off sertraline, obtain the morning cortisol test, and continue with his current fluid restriction.

## 2022-10-17 ENCOUNTER — HOSPITAL ENCOUNTER (OUTPATIENT)
Dept: GENERAL RADIOLOGY | Facility: HOSPITAL | Age: 42
Discharge: HOME | End: 2022-10-17

## 2022-10-17 ENCOUNTER — LAB (OUTPATIENT)
Dept: LAB | Facility: HOSPITAL | Age: 42
End: 2022-10-17

## 2022-10-17 DIAGNOSIS — E87.1 HYPONATREMIA: ICD-10-CM

## 2022-10-17 DIAGNOSIS — Q76.7 CONGENITAL ABNORMALITY OF SHAPE OF STERNUM: ICD-10-CM

## 2022-10-17 LAB — CORTIS AM PEAK SERPL-MCNC: 15.12 MCG/DL (ref 6.02–18.4)

## 2022-10-17 PROCEDURE — 82533 TOTAL CORTISOL: CPT

## 2022-10-17 PROCEDURE — 71046 X-RAY EXAM CHEST 2 VIEWS: CPT

## 2022-10-17 PROCEDURE — 36415 COLL VENOUS BLD VENIPUNCTURE: CPT

## 2022-10-25 ENCOUNTER — OFFICE VISIT (OUTPATIENT)
Dept: SURGERY | Facility: CLINIC | Age: 42
End: 2022-10-25

## 2022-10-25 VITALS — BODY MASS INDEX: 29.51 KG/M2 | WEIGHT: 183.6 LBS | HEIGHT: 66 IN | RESPIRATION RATE: 14 BRPM

## 2022-10-25 DIAGNOSIS — L72.0 EPIDERMAL INCLUSION CYST: Primary | ICD-10-CM

## 2022-10-25 PROCEDURE — 88304 TISSUE EXAM BY PATHOLOGIST: CPT | Performed by: SURGERY

## 2022-10-25 PROCEDURE — 11403 EXC TR-EXT B9+MARG 2.1-3CM: CPT | Performed by: SURGERY

## 2022-10-25 PROCEDURE — 99203 OFFICE O/P NEW LOW 30 MIN: CPT | Performed by: SURGERY

## 2022-10-26 ENCOUNTER — TELEPHONE (OUTPATIENT)
Dept: INTERNAL MEDICINE | Facility: CLINIC | Age: 42
End: 2022-10-26

## 2022-10-26 RX ORDER — DIVALPROEX SODIUM 500 MG/1
1000 TABLET, EXTENDED RELEASE ORAL DAILY
Qty: 180 TABLET | Refills: 2 | Status: SHIPPED | OUTPATIENT
Start: 2022-10-26 | End: 2022-10-27 | Stop reason: SDUPTHER

## 2022-10-26 RX ORDER — DIVALPROEX SODIUM 500 MG/1
1000 TABLET, EXTENDED RELEASE ORAL DAILY
Status: CANCELLED | OUTPATIENT
Start: 2022-10-26

## 2022-10-26 NOTE — PROGRESS NOTES
General Surgery/Colorectal Surgery Note    Patient Name:  Jaleel Garcia  YOB: 1980  [id number]    Referring Provider: Rico Monzon MD      Patient Care Team:  Rico Monzon MD as PCP - General (Internal Medicine)    Chief complaint cyst    Subjective .     History of present illness:    He comes in with his caretakers.  He has a long history of a cyst to his upper back which has increased in size and become more painful.  Patient is a poor historian.  No blood thinner use.  No imaging      History:  Past Medical History:   Diagnosis Date   • Renal disorder    • Seizures (HCC)        Past Surgical History:   Procedure Laterality Date   • KIDNEY SURGERY         History reviewed. No pertinent family history.    Social History     Tobacco Use   • Smoking status: Never   • Smokeless tobacco: Never   Vaping Use   • Vaping Use: Never used   Substance Use Topics   • Alcohol use: Never       Review of Systems  All systems were reviewed and negative except for:   Review of Systems   Constitutional: Negative for chills, fever and unexpected weight loss.   HENT: Negative for congestion, nosebleeds and voice change.    Eyes: Negative for blurred vision, double vision and discharge.   Respiratory: Negative for apnea, chest tightness and shortness of breath.    Cardiovascular: Negative for chest pain and leg swelling.   Gastrointestinal: No nausea vomiting diarrhea abdominal pain   Endocrine: Negative for cold intolerance and heat intolerance.   Genitourinary: Negative for dysuria, hematuria and urgency.   Musculoskeletal: Negative for back pain, joint swelling and neck pain.   Skin: Negative for color change and dry skin.  See HPI  Neurological: Negative for dizziness and confusion.   Hematological: Negative for adenopathy.   Psychiatric/Behavioral: Negative for agitation and behavioral problems.     MEDS:  Prior to Admission medications    Medication Sig Start Date End Date Taking? Authorizing  Provider   Calcium Carbonate-Vitamin D (calcium-vitamin D) 500-200 MG-UNIT tablet per tablet Take 1 tablet by mouth Daily.   Yes Laurel Salazar MD   cetirizine (zyrTEC) 10 MG tablet Take 10 mg by mouth Daily.   Yes Laurel Salazar MD   desmopressin (DDAVP) 0.2 MG tablet Take 0.2 mg by mouth 2 (Two) Times a Day.   Yes Laurel Salazar MD   divalproex (DEPAKOTE ER) 500 MG 24 hr tablet Take 1,000 mg by mouth Daily.   Yes Laurel Salazar MD   escitalopram (Lexapro) 10 MG tablet Take 1 tablet by mouth Daily. To start after titrates off sertraline. 11/2/22  Yes Rico Monzon MD   levETIRAcetam (KEPPRA) 750 MG tablet Take 1 tab in am, and 2 tabs in pm for two weeks. Then take 2 tabs BID thereafter. 9/30/22  Yes Bruce Ponce MD   levothyroxine (SYNTHROID, LEVOTHROID) 50 MCG tablet Take 50 mcg by mouth Every Morning.   Yes Laurel Salazar MD   sertraline (ZOLOFT) 100 MG tablet Take 100 mg PO daily for next two weeks.  Then, 50 mg PO daily the following two weeks.  Then stop this medicine and start escitalopram 10/6/22  Yes Rico Monzon MD        Allergies:  Patient has no known allergies.    Objective     Vital Signs   Resp:  [14] 14    Physical Exam:     General Appearance:    Alert, cooperative, in no acute distress   Head:    Normocephalic, without obvious abnormality, atraumatic   Eyes:          Conjunctivae and sclerae normal, no icterus,     Ears:    Ears appear intact with no abnormalities noted   Throat:   No oral lesions, no thrush, oral mucosa moist   Neck:   No adenopathy, supple, trachea midline, no thyromegaly   Back:     No kyphosis present, no scoliosis present, no skin lesions,      erythema or scars, no tenderness to percussion or                   palpation,   range of motion normal   Lungs:     Clear to auscultation,respirations regular, even and                  unlabored    Heart:    Regular rhythm and normal rate, normal S1 and S2, no            murmur,  "no gallop, no rub, no click   Chest Wall:    No abnormalities observed   Abdomen:     Normal bowel sounds, no masses, no organomegaly, soft        non-tender, non-distended, no guarding, no rebound                tenderness   Rectal:        Extremities:   Moves all extremities well, no edema, no cyanosis, no             redness   Pulses:   Pulses palpable and equal bilaterally   Skin:   No bleeding, bruising or rash, noninflamed epidermal inclusion cyst upper back   Lymph nodes:   No palpable adenopathy   Neurologic:   A/o x 4 with no deficits       Results Review:   {Results Review:95898::\"I reviewed the patient's new clinical results.\"    LABS/IMAGING:  Results for orders placed or performed in visit on 10/17/22   Cortisol - AM    Specimen: Blood   Result Value Ref Range    Cortisol - AM 15.12 6.02 - 18.40 mcg/dL        Result Review :     Assessment & Plan     Symptomatic epidermal inclusion cyst upper back    I recommended excision in the office.  Benefits and alternatives discussed.  Risk procedure including risk of anesthesia, bleeding, infection, recurrence were discussed.  All questions answered.  Consent obtained.  Patient tolerated procedure well.  His caretakers were instructed to follow-up in 10 to 14 days for suture removal.  Call for concern for infection.  Tylenol for pain.  May shower starting tomorrow.  All questions answered.    Procedure note  Preoperative and postoperative diagnosis epidermal inclusion cyst upper back, Surgeon Guillermina, procedure excision of 3 x 3 x 3 cm epidermal inclusion cyst upper back, anesthesia local, estimated blood loss minimal, complications none              This document has been electronically signed by Kenny Sarmiento MD  October 26, 2022 08:12 EDT  "

## 2022-10-26 NOTE — TELEPHONE ENCOUNTER
Caller: KARINA    Relationship: [employer]    Best call back number: [phone]    Requested Prescriptions:   Requested Prescriptions     Pending Prescriptions Disp Refills   • divalproex (DEPAKOTE ER) 500 MG 24 hr tablet       Sig: Take 2 tablets by mouth Daily.   • cetirizine (zyrTEC) 10 MG tablet       Sig: Take 1 tablet by mouth Daily.   • desmopressin (DDAVP) 0.2 MG tablet       Sig: Take 1 tablet by mouth 2 (Two) Times a Day.   • levothyroxine (SYNTHROID, LEVOTHROID) 50 MCG tablet       Sig: Take 1 tablet by mouth Every Morning.    CALCIUM    Pharmacy where request should be sent: [facility], [address] - [phone]  - [phone]     Additional details provided by patient: CALLER STATED THAT PATIENT NEEDS UPDATED SCRIPTS.    Does the patient have less than a 3 day supply:  [] Yes  [x] No    Dashawn Bourne Rep   10/26/22 14:03 EDT

## 2022-10-26 NOTE — TELEPHONE ENCOUNTER
Rx Refill Note  Requested Prescriptions     Pending Prescriptions Disp Refills   • divalproex (DEPAKOTE ER) 500 MG 24 hr tablet       Sig: Take 2 tablets by mouth Daily.   • cetirizine (zyrTEC) 10 MG tablet       Sig: Take 1 tablet by mouth Daily.   • desmopressin (DDAVP) 0.2 MG tablet       Sig: Take 1 tablet by mouth 2 (Two) Times a Day.   • levothyroxine (SYNTHROID, LEVOTHROID) 50 MCG tablet       Sig: Take 1 tablet by mouth Every Morning.      Last office visit with prescribing clinician: 10/6/2022      Next office visit with prescribing clinician: 12/30/2022            Eleazar Camacho  10/26/22, 14:13 EDT

## 2022-10-27 ENCOUNTER — TELEPHONE (OUTPATIENT)
Dept: INTERNAL MEDICINE | Facility: CLINIC | Age: 42
End: 2022-10-27

## 2022-10-27 LAB
CYTO UR: NORMAL
LAB AP CASE REPORT: NORMAL
LAB AP CLINICAL INFORMATION: NORMAL
PATH REPORT.FINAL DX SPEC: NORMAL
PATH REPORT.GROSS SPEC: NORMAL

## 2022-10-27 RX ORDER — DESMOPRESSIN ACETATE 0.2 MG/1
0.2 TABLET ORAL 2 TIMES DAILY
Qty: 180 TABLET | Refills: 2 | Status: SHIPPED | OUTPATIENT
Start: 2022-10-27 | End: 2022-11-02

## 2022-10-27 RX ORDER — CETIRIZINE HYDROCHLORIDE 10 MG/1
10 TABLET ORAL DAILY
Qty: 90 TABLET | Refills: 2 | Status: SHIPPED | OUTPATIENT
Start: 2022-10-27

## 2022-10-27 RX ORDER — DIVALPROEX SODIUM 500 MG/1
1000 TABLET, EXTENDED RELEASE ORAL 2 TIMES DAILY
Qty: 360 TABLET | Refills: 2 | Status: SHIPPED | OUTPATIENT
Start: 2022-10-27 | End: 2023-03-01

## 2022-10-27 RX ORDER — LEVOTHYROXINE SODIUM 0.05 MG/1
50 TABLET ORAL
Qty: 90 TABLET | Refills: 3 | Status: SHIPPED | OUTPATIENT
Start: 2022-10-27 | End: 2022-12-30 | Stop reason: SDUPTHER

## 2022-10-27 NOTE — TELEPHONE ENCOUNTER
[facility] CALLED SAYING WE SENT IN:  DEPAKOTE  MG TAKE 2 TABLETS PO QD     PHARMACY STATES PATIENT WAS TAKING DEPAKOTE  MG TAKE 2 TABLETS PO BID    PHARMACY WANTS TO VERIFY IF PATIENT SHOULD BE TAKING 2 TABLETS PER DAY OR 4 TABLETS PER DAY.     PLEASE ADVISE.

## 2022-10-27 NOTE — TELEPHONE ENCOUNTER
I'd like to continue him on the dose and administration of depakote he was so recently taking.  New prescription sent.

## 2022-10-28 RX ORDER — DESMOPRESSIN ACETATE 0.2 MG/1
TABLET ORAL
Qty: 180 TABLET | Refills: 2 | OUTPATIENT
Start: 2022-10-28

## 2022-10-28 NOTE — TELEPHONE ENCOUNTER
I'm okay with a 2,000 calorie a day diet.  I've written a prescription, and it is in the scan folder.  We can scan it, and let [facility] know about the new calorie limit (they'll want a written prescription).

## 2022-10-28 NOTE — TELEPHONE ENCOUNTER
CONTACTED PT MOTHER REGARDING LAB / IMAGINING RESULTS. MOTHER VERIFIED . I INFORMED MOTHER OF THE BELOW RESULTS NOTE PER . MOTHER HAD NO CONCERNS BUT HAD QUESTIONS REGARDING CALORIE LIMIT, STATES PT IS CURRENTLY ONLY USING 1800 CALORIES PER DAY AND WOULD LIKE IT TO BE 2000. MOTHER DOES NOT WANT A REFERRAL FOR PECTUS CARINATUM. MOTHER VOICED UNDERSTANDING. NO FURTHER QUESTIONS.    Rico Monzon MD   10/20/2022  8:12 AM EDT       X-ray shows 70 degrees of angulation in the xyphoid process, which is the tip of the breastbone.  Overall, I think that what Mr. Garcia has is pectus carinatum, which is a deformity of the breastbone.  It is not dangerous in and of itself.  It can potentially be corrected surgically.  I can place a referral if you would like.     AM cortisol does not suggest adrenal insufficiency.  We can consider additional testing in the future, but for now I think it's less likely that he has problems with his adrenals.

## 2022-11-02 ENCOUNTER — TELEPHONE (OUTPATIENT)
Dept: INTERNAL MEDICINE | Facility: CLINIC | Age: 42
End: 2022-11-02

## 2022-11-02 RX ORDER — DESMOPRESSIN ACETATE 0.2 MG/1
0.2 TABLET ORAL DAILY
Qty: 90 TABLET | Refills: 3 | Status: SHIPPED | OUTPATIENT
Start: 2022-11-02 | End: 2022-11-02 | Stop reason: SDUPTHER

## 2022-11-02 RX ORDER — DESMOPRESSIN ACETATE 0.2 MG/1
0.4 TABLET ORAL NIGHTLY
Qty: 180 TABLET | Refills: 3 | Status: SHIPPED | OUTPATIENT
Start: 2022-11-02

## 2022-11-02 NOTE — TELEPHONE ENCOUNTER
Spoke with Rose with [facility].  Patient's desmopressin was sent in incorrectly. I have corrected it and sent. She reports patient takes 2 tablets PO Nightly. OK per Dr. Monzon.

## 2022-11-08 ENCOUNTER — OFFICE VISIT (OUTPATIENT)
Dept: SURGERY | Facility: CLINIC | Age: 42
End: 2022-11-08

## 2022-11-08 VITALS — RESPIRATION RATE: 12 BRPM | WEIGHT: 183 LBS | HEIGHT: 66 IN | BODY MASS INDEX: 29.41 KG/M2

## 2022-11-08 DIAGNOSIS — L72.0 EPIDERMAL INCLUSION CYST: Primary | ICD-10-CM

## 2022-11-08 PROCEDURE — 99212 OFFICE O/P EST SF 10 MIN: CPT | Performed by: SURGERY

## 2022-11-10 NOTE — PROGRESS NOTES
General Surgery/Colorectal Surgery Note    Patient Name:  Jaleel Garcia  YOB: 1980  [id number]    Referring Provider: No ref. provider found      Patient Care Team:  Rico Monzon MD as PCP - General (Internal Medicine)    Chief complaint follow-up    Subjective .     History of present illness:    Status post excision epidermal inclusion cyst upper back 10/25/2022.  Pathology with epidermal inclusion cyst    He comes in for follow-up.  No fever, erythema, drainage.  No pain.  He is pleased with the surgery    History:  Past Medical History:   Diagnosis Date   • Renal disorder    • Seizures (HCC)        Past Surgical History:   Procedure Laterality Date   • KIDNEY SURGERY         History reviewed. No pertinent family history.    Social History     Tobacco Use   • Smoking status: Never   • Smokeless tobacco: Never   Vaping Use   • Vaping Use: Never used   Substance Use Topics   • Alcohol use: Never       Review of Systems  All systems were reviewed and negative except for:   Review of Systems   Constitutional: Negative for chills, fever and unexpected weight loss.   HENT: Negative for congestion, nosebleeds and voice change.    Eyes: Negative for blurred vision, double vision and discharge.   Respiratory: Negative for apnea, chest tightness and shortness of breath.    Cardiovascular: Negative for chest pain and leg swelling.   Gastrointestinal:        See HPI   Endocrine: Negative for cold intolerance and heat intolerance.   Genitourinary: Negative for dysuria, hematuria and urgency.   Musculoskeletal: Negative for back pain, joint swelling and neck pain.   Skin: Negative for color change and dry skin.   Neurological: Negative for dizziness and confusion.   Hematological: Negative for adenopathy.   Psychiatric/Behavioral: Negative for agitation and behavioral problems.     MEDS:  Prior to Admission medications    Medication Sig Start Date End Date Taking? Authorizing Provider   Calcium  "Carb-Cholecalciferol (Oyster Shell Calcium w/D) 500-5 MG-MCG tablet TAKE ONE TABLET BY MOUTH EVERY DAY 11/2/22  Yes Rico Monzon MD   Calcium Carbonate-Vitamin D (calcium-vitamin D) 500-200 MG-UNIT tablet per tablet Take 1 tablet by mouth Daily.   Yes Provider, MD Laurel   cetirizine (zyrTEC) 10 MG tablet Take 1 tablet by mouth Daily. 10/27/22  Yes Rico Monzon MD   desmopressin (DDAVP) 0.2 MG tablet Take 2 tablets by mouth Every Night. 11/2/22  Yes Rico Monzon MD   divalproex (DEPAKOTE ER) 500 MG 24 hr tablet Take 2 tablets by mouth 2 (Two) Times a Day for 90 days. 10/27/22 1/25/23 Yes Rico Monzon MD   escitalopram (Lexapro) 10 MG tablet Take 1 tablet by mouth Daily. To start after titrates off sertraline. 11/2/22  Yes Rico Monzon MD   levETIRAcetam (KEPPRA) 750 MG tablet Take 1 tab in am, and 2 tabs in pm for two weeks. Then take 2 tabs BID thereafter. 9/30/22  Yes RoelpillBruce rockwell MD   levothyroxine (SYNTHROID, LEVOTHROID) 50 MCG tablet Take 1 tablet by mouth Every Morning. 10/27/22  Yes Rico Monzon MD   sertraline (ZOLOFT) 100 MG tablet Take 100 mg PO daily for next two weeks.  Then, 50 mg PO daily the following two weeks.  Then stop this medicine and start escitalopram 10/6/22  Yes Rico Monzon MD        Allergies:  Patient has no known allergies.    Objective     Vital Signs        Physical Exam: Incision without evidence of infection        Results Review:   {Results Review:23773::\"I reviewed the patient's new clinical results.\"    LABS/IMAGING:  Results for orders placed or performed in visit on 10/25/22   Tissue Pathology Exam    Specimen: Back, Middle; Tissue   Result Value Ref Range    Case Report       Surgical Pathology Report                         Case: [id number]                                  Authorizing Provider:  Kenny Sarmiento MD  Collected:           10/25/2022 12:53 PM          Ordering Location:     [facility]" "Received:            10/25/2022 12:55 PM                                 GROUP GENERAL SURGERY                                                        Pathologist:           Karen Christina DO                                                       Specimen:    Back, Middle                                                                               Clinical Information       Epidermal inclusion cyst      Final Diagnosis       Middle back cyst, excision:   - Epidermal inclusion cyst        Gross Description       1. Back, Middle.  The specimen is received in 1 formalin filled container labeled \"middle back\" and consists of a 2.0 cm tan, disrupted cyst containing tan, friable, keratinized material.  No skin is grossly identified.  Sectioning reveals a tan, soft cut surface.  Representative sections are submitted in 1 cassette. OLGA LIDIA        Microscopic Description          Result Review :     Assessment & Plan     Status post excision epidermal inclusion cyst upper back 10/25/2022.  Pathology with epidermal inclusion cyst    I reviewed the pathology with the patient.  We removed just sutures.  Activity as tolerated.  Follow-up me as needed.  All questions answered.  He agrees with the plan              This document has been electronically signed by eKnny Sarmiento MD  November 10, 2022 10:19 EST  "

## 2022-12-30 ENCOUNTER — OFFICE VISIT (OUTPATIENT)
Dept: INTERNAL MEDICINE | Facility: CLINIC | Age: 42
End: 2022-12-30

## 2022-12-30 VITALS
HEIGHT: 66 IN | RESPIRATION RATE: 14 BRPM | WEIGHT: 187.6 LBS | BODY MASS INDEX: 30.15 KG/M2 | HEART RATE: 61 BPM | TEMPERATURE: 97.6 F | OXYGEN SATURATION: 96 % | SYSTOLIC BLOOD PRESSURE: 99 MMHG | DIASTOLIC BLOOD PRESSURE: 63 MMHG

## 2022-12-30 DIAGNOSIS — E03.8 OTHER SPECIFIED HYPOTHYROIDISM: ICD-10-CM

## 2022-12-30 DIAGNOSIS — G40.909 SEIZURE DISORDER: ICD-10-CM

## 2022-12-30 DIAGNOSIS — E87.1 HYPONATREMIA: Primary | ICD-10-CM

## 2022-12-30 DIAGNOSIS — F70 MILD INTELLECTUAL DISABILITY: ICD-10-CM

## 2022-12-30 LAB
ANION GAP SERPL CALCULATED.3IONS-SCNC: 9 MMOL/L (ref 5–15)
BUN SERPL-MCNC: 14 MG/DL (ref 6–20)
BUN/CREAT SERPL: 22.6 (ref 7–25)
CALCIUM SPEC-SCNC: 8.9 MG/DL (ref 8.6–10.5)
CHLORIDE SERPL-SCNC: 95 MMOL/L (ref 98–107)
CO2 SERPL-SCNC: 27 MMOL/L (ref 22–29)
CREAT SERPL-MCNC: 0.62 MG/DL (ref 0.76–1.27)
EGFRCR SERPLBLD CKD-EPI 2021: 123.1 ML/MIN/1.73
GLUCOSE SERPL-MCNC: 81 MG/DL (ref 65–99)
POTASSIUM SERPL-SCNC: 4.7 MMOL/L (ref 3.5–5.2)
SODIUM SERPL-SCNC: 131 MMOL/L (ref 136–145)

## 2022-12-30 PROCEDURE — 99214 OFFICE O/P EST MOD 30 MIN: CPT | Performed by: STUDENT IN AN ORGANIZED HEALTH CARE EDUCATION/TRAINING PROGRAM

## 2022-12-30 PROCEDURE — 80048 BASIC METABOLIC PNL TOTAL CA: CPT | Performed by: STUDENT IN AN ORGANIZED HEALTH CARE EDUCATION/TRAINING PROGRAM

## 2022-12-30 RX ORDER — LEVOTHYROXINE SODIUM 0.05 MG/1
50 TABLET ORAL
Qty: 90 TABLET | Refills: 3 | Status: SHIPPED | OUTPATIENT
Start: 2022-12-30

## 2022-12-30 NOTE — PROGRESS NOTES
"Chief Complaint  Follow-up (hyponatremia)    Subjective          Jaleel Garcia presents to [facility]  History of Present Illness    Here with Dari from [facility].    Wasn't able to spend Shahram with family due to the weather, but will have a late celebration with them soon.    No seizures since last visit.      Current Outpatient Medications   Medication Instructions   • Calcium Carb-Cholecalciferol (Oyster Shell Calcium w/D) 500-5 MG-MCG tablet TAKE ONE TABLET BY MOUTH EVERY DAY   • Calcium Carbonate-Vitamin D (calcium-vitamin D) 500-200 MG-UNIT tablet per tablet 1 tablet, Oral, Daily   • cetirizine (ZYRTEC) 10 mg, Oral, Daily   • desmopressin (DDAVP) 400 mcg, Oral, Nightly   • divalproex (DEPAKOTE ER) 1,000 mg, Oral, 2 Times Daily   • escitalopram (LEXAPRO) 10 mg, Oral, Daily, To start after titrates off sertraline.   • levETIRAcetam (KEPPRA) 750 MG tablet Take 1 tab in am, and 2 tabs in pm for two weeks. Then take 2 tabs BID thereafter.   • levothyroxine (SYNTHROID, LEVOTHROID) 50 mcg, Oral, Every Early Morning   • sertraline (ZOLOFT) 100 MG tablet Take 100 mg PO daily for next two weeks.  Then, 50 mg PO daily the following two weeks.  Then stop this medicine and start escitalopram       The following portions of the patient's history were reviewed and updated as appropriate: allergies, current medications, past family history, past medical history, past social history, past surgical history, and problem list.    Objective   Vital Signs:   BP 99/63   Pulse 61   Temp 97.6 °F (36.4 °C) (Temporal)   Resp 14   Ht 167.6 cm (66\")   Wt 85.1 kg (187 lb 9.6 oz)   SpO2 96%   BMI 30.28 kg/m²     Wt Readings from Last 3 Encounters:   12/30/22 85.1 kg (187 lb 9.6 oz)   11/08/22 83 kg (183 lb)   10/25/22 83.3 kg (183 lb 9.6 oz)     BP Readings from Last 3 Encounters:   12/30/22 99/63   10/06/22 108/67   10/01/22 109/68     Physical Exam  Vitals reviewed. "   Constitutional:       General: He is not in acute distress.     Appearance: Normal appearance. He is not ill-appearing, toxic-appearing or diaphoretic.   HENT:      Head: Normocephalic and atraumatic.      Right Ear: External ear normal.      Left Ear: External ear normal.   Eyes:      Conjunctiva/sclera: Conjunctivae normal.   Cardiovascular:      Rate and Rhythm: Normal rate and regular rhythm.      Pulses: Normal pulses.      Heart sounds: Normal heart sounds. No murmur heard.    No friction rub. No gallop.   Pulmonary:      Effort: Pulmonary effort is normal. No respiratory distress.      Breath sounds: Normal breath sounds. No stridor. No wheezing, rhonchi or rales.   Chest:      Chest wall: No tenderness.   Abdominal:      General: Abdomen is flat.      Palpations: Abdomen is soft. There is no mass.      Tenderness: There is no abdominal tenderness.   Musculoskeletal:      Right lower leg: No edema.      Left lower leg: No edema.   Skin:     General: Skin is warm and dry.   Neurological:      General: No focal deficit present.      Mental Status: He is alert. Mental status is at baseline.   Psychiatric:         Mood and Affect: Mood normal.         Behavior: Behavior normal.         Thought Content: Thought content normal.         Judgment: Judgment normal.       Result Review :   The following data was reviewed by: Rico Monzon MD on 12/30/2022:  Common labs    Common Labs 9/8/22 9/30/22 9/30/22 9/30/22 9/30/22 9/30/22 10/6/22     1549 1549 1549 2109 2109    Glucose 71   101 (A)  94 73   BUN 13   21 (A)  22 (A) 14   Creatinine 0.58 (A)   0.75 (A)  0.80 0.59 (A)   Sodium 135 (A)   135 (A)  136 135 (A)   Potassium 4.9   4.3  4.2 4.7   Chloride 98   98  98 98   Calcium 9.2   9.5  9.5 9.1   Albumin    4.20      Total Bilirubin    0.3      Alkaline Phosphatase    50      AST (SGOT)    10      ALT (SGPT)    9      WBC  6.99   6.49     Hemoglobin  13.7   14.2     Hematocrit  42.0   41.8     Platelets  154    158     Total Cholesterol   121       Triglycerides   72       HDL Cholesterol   39 (A)       LDL Cholesterol    67       (A) Abnormal value       Comments are available for some flowsheets but are not being displayed.                  Lab Results   Component Value Date    COVID19 NOT DETECTED 06/04/2020    INR 1.10 (L) 12/26/2021    BILIRUBINUR Negative 10/01/2022       Procedures        Assessment and Plan    Diagnoses and all orders for this visit:    1. Hyponatremia (Primary)  -     Basic Metabolic Panel    2. Seizure disorder (HCC)    3. Mild intellectual disability    4. Other specified hypothyroidism  -     levothyroxine (SYNTHROID, LEVOTHROID) 50 MCG tablet; Take 1 tablet by mouth Every Morning.  Dispense: 90 tablet; Refill: 3      Hyponatremia:  -will continue fluid restriction  -will check labs today  -AM cortisol was reassuring    Seizure disorder:  -stable    Medications Discontinued During This Encounter   Medication Reason   • levothyroxine (SYNTHROID, LEVOTHROID) 50 MCG tablet Reorder          Follow Up   Return in about 3 months (around 3/30/2023) for hyponatremia.  Patient was given instructions and counseling regarding his condition or for health maintenance advice. Please see specific information pulled into the AVS if appropriate.       Rico Monzon MD  12/30/22  11:30 EST

## 2023-01-03 ENCOUNTER — TELEPHONE (OUTPATIENT)
Dept: INTERNAL MEDICINE | Facility: CLINIC | Age: 43
End: 2023-01-03
Payer: MEDICARE

## 2023-01-03 NOTE — TELEPHONE ENCOUNTER
Left message to return call.       HUB OK TO READ/ADVISE   ----- Message from Rico Monzon MD sent at 1/1/2023  3:56 PM EST -----  BMP notable for mild decrease in sodium from 135 to 131.  I would like to continue Mr. Garcia's current fluid restriction.  We will continue to monitor.

## 2023-01-03 NOTE — TELEPHONE ENCOUNTER
----- Message from Rico Monzon MD sent at 1/1/2023  3:56 PM EST -----  BMP notable for mild decrease in sodium from 135 to 131.  I would like to continue Mr. Gacria's current fluid restriction.  We will continue to monitor.

## 2023-01-04 NOTE — TELEPHONE ENCOUNTER
HUB TRANSFERRED OVER  MOTHER WAS JUST NEEDING MORE INFORMATION ON THAT. I EXPLAINED EVERYTHING TO HER, SHE VERBALLY UNDERSTOOD

## 2023-01-10 ENCOUNTER — TELEPHONE (OUTPATIENT)
Dept: INTERNAL MEDICINE | Facility: CLINIC | Age: 43
End: 2023-01-10
Payer: MEDICARE

## 2023-01-10 NOTE — LETTER
January 16, 2023    Jaleel Garcia  [address] [place] [address]      To whom it may concern,    It is my recommendation that patient follow a 2,000 calorie diet with a 50 oz fluid restriction. Within the 2,000 calorie diet, patient should split this with whole grains, vegetables, and protein, dairy and fruit. It is my recommendation that this is split with one-third being from whole grains, one-third being from vegetables, and the last third being from dairy/meat/fruits.    Please contact our office with any further questions or concerns.      Sincerely,    [facility]  Rico Monzon MD

## 2023-01-10 NOTE — TELEPHONE ENCOUNTER
Caller: MELINDA    Relationship: [facility]     Best call back number: [phone]    What is the best time to reach you: ANYTIME     Who are you requesting to speak with (clinical staff, provider,  specific staff member): CLINICAL     What was the call regarding: MELINDA WOULD LIKE A CALLBACK TO SPEAK WITH PCP REGARDING CLARIFICATION ON PATIENTS DIET. PLEASE ADVISE     Do you require a callback: YES

## 2023-01-13 NOTE — TELEPHONE ENCOUNTER
"Paperwork and written prescriptions scanned under media tab 12/30/22 under \"[employer] Signed\" specifying 2,000 calorie diet as well as a 50 oz fluid restriction.    Can you call [employer] to let them know?  His sodium worsened last visit ,and I don't feel comfortable going up on his fluid restriction just now.  "

## 2023-01-13 NOTE — TELEPHONE ENCOUNTER
HUB OK TO READ/ADVISE:  2,000 calorie diet as well as a 50 oz fluid restriction.  Per Dr. Monzon: His sodium worsened last visit ,and I don't feel comfortable going up on his fluid restriction just now.

## 2023-01-16 NOTE — TELEPHONE ENCOUNTER
Dari from [employer] if wanting to know what specifically the calorie intake needs to be. If it needs to be sugar or carb. Please advise.    FAX # [phone]

## 2023-01-16 NOTE — TELEPHONE ENCOUNTER
That's an excellent question.  Broadly speaking, I'd recommend about a 1/3 in a day come for whole grains, another 1/3 from vegetables.  For the final third, I'd split it up evenly between proteins (meats), dairy products, and fruit.

## 2023-01-16 NOTE — TELEPHONE ENCOUNTER
Attempted to call [facility] for MELINDA. Left a voicemail to call our office back.     HUB OK TO READ/ADVISE:  2,000 calorie diet as well as a 50 oz fluid restriction.  Per Dr. Monzon: His sodium worsened last visit ,and I don't feel comfortable going up on his fluid restriction just now.

## 2023-01-16 NOTE — TELEPHONE ENCOUNTER
Spoke with Dari at [facility] again, requested that letter be sent stating the specifics for diet recommendations. Letter was written and faxed to number provided below.

## 2023-01-17 ENCOUNTER — TELEPHONE (OUTPATIENT)
Dept: INTERNAL MEDICINE | Facility: CLINIC | Age: 43
End: 2023-01-17
Payer: MEDICARE

## 2023-01-17 NOTE — TELEPHONE ENCOUNTER
Caller: [employer]    Best call back number: [phone]    Who are you requesting to speak with (clinical staff, provider,  specific staff member): MEDICAL STAFF    Do you know the name of the person who called: MELINDA    What was the call regarding: RASHARD RECEIVED THE LETTER REGARDING PATIENTS DIET. THEY ARE UNABLE TO TRACK IF PATIENT IS GETTING 1/3 OF EACH SERVING. MELINDA IS REQUESTING THAT A LETTER IS WRITTEN OUT STATING THAT THE PATIENT NEEDS TO HAVE A HEALTHY WELL BALANCED 2000 CALORIE DIET AND STAFF DOES NOT NEED TO TRACK THE CALORIES OR SUGAR.

## 2023-01-18 ENCOUNTER — TELEPHONE (OUTPATIENT)
Dept: INTERNAL MEDICINE | Facility: CLINIC | Age: 43
End: 2023-01-18
Payer: MEDICARE

## 2023-01-18 NOTE — TELEPHONE ENCOUNTER
Please draft a letter stating the following:    To Whom It May Concern,    Mr. Garcia is a patient of mine, that currently is cared for at [facility].  They have requested further documentation as to his diet.  It is my recommendation that Mr. Garcia consume a healthy, well balanced 2,000 calorie diet each day.  [facility] has also communicated to our clinic that their staff are unable to track calories, sugar content, etc.    Sincerely,    Rico Smiley MD

## 2023-01-18 NOTE — TELEPHONE ENCOUNTER
Caller: LARISSA LEMA    Relationship: [employer]     Best call back number:    [phone]         What form or medical record are you requesting: MOST RECENT FLU SHOT     Who is requesting this form or medical record from you: [employer]     How would you like to receive the form or medical records (pick-up, mail, fax): [phone]    Timeframe paperwork needed: ASAP

## 2023-01-24 ENCOUNTER — TELEPHONE (OUTPATIENT)
Dept: INTERNAL MEDICINE | Facility: CLINIC | Age: 43
End: 2023-01-24
Payer: MEDICARE

## 2023-02-03 NOTE — TELEPHONE ENCOUNTER
Rx Refill Note  Requested Prescriptions     Pending Prescriptions Disp Refills   • divalproex (DEPAKOTE ER) 500 MG 24 hr tablet 90 tablet 0     Sig: Take 2 tablets by mouth Daily.      Last office visit with prescribing clinician: 10/6/2022      Next office visit with prescribing clinician: 12/30/2022            Eleazar Camacho  10/26/22, 11:01 EDT  
Yes

## 2023-02-06 ENCOUNTER — TELEPHONE (OUTPATIENT)
Dept: INTERNAL MEDICINE | Facility: CLINIC | Age: 43
End: 2023-02-06
Payer: MEDICARE

## 2023-02-16 ENCOUNTER — OFFICE VISIT (OUTPATIENT)
Dept: INTERNAL MEDICINE | Facility: CLINIC | Age: 43
End: 2023-02-16
Payer: MEDICARE

## 2023-02-16 VITALS
TEMPERATURE: 98.2 F | HEIGHT: 66 IN | HEART RATE: 65 BPM | OXYGEN SATURATION: 98 % | WEIGHT: 185.6 LBS | BODY MASS INDEX: 29.83 KG/M2 | SYSTOLIC BLOOD PRESSURE: 134 MMHG | DIASTOLIC BLOOD PRESSURE: 71 MMHG

## 2023-02-16 DIAGNOSIS — G40.909 SEIZURE DISORDER: ICD-10-CM

## 2023-02-16 DIAGNOSIS — F70 MILD INTELLECTUAL DISABILITY: ICD-10-CM

## 2023-02-16 DIAGNOSIS — H61.20 COMPLAINT OF WAX IN EAR: Primary | ICD-10-CM

## 2023-02-16 DIAGNOSIS — H61.23 BILATERAL IMPACTED CERUMEN: ICD-10-CM

## 2023-02-16 DIAGNOSIS — E87.1 HYPONATREMIA: ICD-10-CM

## 2023-02-16 LAB
ANION GAP SERPL CALCULATED.3IONS-SCNC: 9.6 MMOL/L (ref 5–15)
BUN SERPL-MCNC: 11 MG/DL (ref 6–20)
BUN/CREAT SERPL: 18 (ref 7–25)
CALCIUM SPEC-SCNC: 9 MG/DL (ref 8.6–10.5)
CHLORIDE SERPL-SCNC: 97 MMOL/L (ref 98–107)
CO2 SERPL-SCNC: 29.4 MMOL/L (ref 22–29)
CREAT SERPL-MCNC: 0.61 MG/DL (ref 0.76–1.27)
EGFRCR SERPLBLD CKD-EPI 2021: 123 ML/MIN/1.73
GLUCOSE SERPL-MCNC: 75 MG/DL (ref 65–99)
POTASSIUM SERPL-SCNC: 5 MMOL/L (ref 3.5–5.2)
SODIUM SERPL-SCNC: 136 MMOL/L (ref 136–145)

## 2023-02-16 PROCEDURE — 69209 REMOVE IMPACTED EAR WAX UNI: CPT | Performed by: STUDENT IN AN ORGANIZED HEALTH CARE EDUCATION/TRAINING PROGRAM

## 2023-02-16 PROCEDURE — 99214 OFFICE O/P EST MOD 30 MIN: CPT | Performed by: STUDENT IN AN ORGANIZED HEALTH CARE EDUCATION/TRAINING PROGRAM

## 2023-02-16 PROCEDURE — 80048 BASIC METABOLIC PNL TOTAL CA: CPT | Performed by: STUDENT IN AN ORGANIZED HEALTH CARE EDUCATION/TRAINING PROGRAM

## 2023-02-16 NOTE — PROGRESS NOTES
"Chief Complaint  Ear Fullness    Subjective          Jaleel Garcia presents to [facility]  History of Present Illness    Here with Dari from [facility], who is primary historian.    Here with complaint of right sided otalgia for one week.  Using tylenol PRN.    Compliant with fluid restriction.    No interim seizures.  Following up with neurology (Dr. Ponce) later this month.    Current Outpatient Medications   Medication Instructions   • Calcium Carb-Cholecalciferol (Oyster Shell Calcium w/D) 500-5 MG-MCG tablet TAKE ONE TABLET BY MOUTH EVERY DAY   • Calcium Carbonate-Vitamin D (calcium-vitamin D) 500-200 MG-UNIT tablet per tablet 1 tablet, Oral, Daily   • cetirizine (ZYRTEC) 10 mg, Oral, Daily   • desmopressin (DDAVP) 400 mcg, Oral, Nightly   • divalproex (DEPAKOTE ER) 1,000 mg, Oral, 2 Times Daily   • escitalopram (LEXAPRO) 10 mg, Oral, Daily, To start after titrates off sertraline.   • levETIRAcetam (KEPPRA) 750 MG tablet Take 1 tab in am, and 2 tabs in pm for two weeks. Then take 2 tabs BID thereafter.   • levothyroxine (SYNTHROID, LEVOTHROID) 50 mcg, Oral, Every Early Morning   • sertraline (ZOLOFT) 100 MG tablet Take 100 mg PO daily for next two weeks.  Then, 50 mg PO daily the following two weeks.  Then stop this medicine and start escitalopram       The following portions of the patient's history were reviewed and updated as appropriate: allergies, current medications, past family history, past medical history, past social history, past surgical history, and problem list.    Objective   Vital Signs:   /71   Pulse 65   Temp 98.2 °F (36.8 °C) (Temporal)   Ht 167.6 cm (66\")   Wt 84.2 kg (185 lb 9.6 oz)   SpO2 98%   BMI 29.96 kg/m²     Wt Readings from Last 3 Encounters:   02/16/23 84.2 kg (185 lb 9.6 oz)   12/30/22 85.1 kg (187 lb 9.6 oz)   11/08/22 83 kg (183 lb)     BP Readings from Last 3 Encounters:   02/16/23 134/71   12/30/22 99/63 "   10/06/22 108/67     Physical Exam  Vitals reviewed.   Constitutional:       General: He is not in acute distress.     Appearance: Normal appearance. He is not ill-appearing, toxic-appearing or diaphoretic.   HENT:      Head: Normocephalic and atraumatic.      Right Ear: External ear normal.      Left Ear: External ear normal.      Ears:      Comments: Bilateral cerumen impaction noted.  After bilateral irrigation, I noted TM WNL.  Canals with mild erythema noted after irrgation  Eyes:      Conjunctiva/sclera: Conjunctivae normal.   Cardiovascular:      Rate and Rhythm: Normal rate and regular rhythm.      Pulses: Normal pulses.      Heart sounds: Normal heart sounds. No murmur heard.    No friction rub. No gallop.   Pulmonary:      Effort: Pulmonary effort is normal. No respiratory distress.      Breath sounds: Normal breath sounds. No stridor. No wheezing, rhonchi or rales.   Chest:      Chest wall: No tenderness.   Abdominal:      General: Abdomen is flat.      Palpations: Abdomen is soft. There is no mass.      Tenderness: There is no abdominal tenderness.   Musculoskeletal:      Right lower leg: No edema.      Left lower leg: No edema.   Skin:     General: Skin is warm and dry.   Neurological:      Mental Status: He is alert. Mental status is at baseline.       Result Review :   The following data was reviewed by: Rico Monzon MD on 02/16/2023:  Common labs    Common Labs 9/30/22 9/30/22 9/30/22 9/30/22 9/30/22 10/6/22 12/30/22    1549 1549 1549 2109 2109     Glucose   101 (A)  94 73 81   BUN   21 (A)  22 (A) 14 14   Creatinine   0.75 (A)  0.80 0.59 (A) 0.62 (A)   Sodium   135 (A)  136 135 (A) 131 (A)   Potassium   4.3  4.2 4.7 4.7   Chloride   98  98 98 95 (A)   Calcium   9.5  9.5 9.1 8.9   Albumin   4.20       Total Bilirubin   0.3       Alkaline Phosphatase   50       AST (SGOT)   10       ALT (SGPT)   9       WBC 6.99   6.49      Hemoglobin 13.7   14.2      Hematocrit 42.0   41.8      Platelets 154    158      Total Cholesterol  121        Triglycerides  72        HDL Cholesterol  39 (A)        LDL Cholesterol   67        (A) Abnormal value       Comments are available for some flowsheets but are not being displayed.                  Lab Results   Component Value Date    COVID19 NOT DETECTED 06/04/2020    INR 1.10 (L) 12/26/2021    BILIRUBINUR Negative 10/01/2022       Ear Cerumen Removal    Date/Time: 2/16/2023 4:47 PM  Performed by: Rico Monzon MD  Authorized by: Rico Monzon MD     Anesthesia:  Local Anesthetic: none  Location details: left ear and right ear  Patient tolerance: patient tolerated the procedure well with no immediate complications  Procedure type: irrigation   Sedation:  Patient sedated: no                Assessment and Plan    Diagnoses and all orders for this visit:    1. Complaint of wax in ear (Primary)  -     Ear Cerumen Removal    2. Hyponatremia  -     Basic Metabolic Panel    3. Mild intellectual disability    4. Bilateral impacted cerumen  -     Ear Cerumen Removal    5. Seizure disorder (HCC)      Mild ID:  -stable    Seizure disorder:  -stable  -will continue depakote ER and keppra        There are no discontinued medications.       Follow Up   Return in about 3 months (around 5/16/2023) for Hyponatremia.  Patient was given instructions and counseling regarding his condition or for health maintenance advice. Please see specific information pulled into the AVS if appropriate.       Rico Monzon MD  02/16/23  16:48 EST

## 2023-02-20 ENCOUNTER — TELEPHONE (OUTPATIENT)
Dept: INTERNAL MEDICINE | Facility: CLINIC | Age: 43
End: 2023-02-20
Payer: MEDICARE

## 2023-02-20 NOTE — TELEPHONE ENCOUNTER
----- Message from Rico Monzon MD sent at 2/17/2023  6:22 AM EST -----  BMP is reassuring.  Sodium is in the normal range.  Renal function is stable, and also in the normal range.

## 2023-02-20 NOTE — TELEPHONE ENCOUNTER
I contact the number on file for the patients legal guardian, Zofia. Dari Hicks picked up the phone and states she is the supervisor for the home that the pt lives in and says there are papers in his stating she is allowed to take information regarding his health, I did verify this in his media tab. Dari voiced understanding to the lab results and ensured me that she would ensure Zofia was made aware

## 2023-02-27 ENCOUNTER — TELEPHONE (OUTPATIENT)
Dept: INTERNAL MEDICINE | Facility: CLINIC | Age: 43
End: 2023-02-27
Payer: MEDICARE

## 2023-02-27 NOTE — TELEPHONE ENCOUNTER
Patient care taker called our office asking when the last time a vit d and vit b12 were drawn, per pt chart it looks like we have never done those. She was curious as well to the last time a full panel was done, I stated it had been awhile and she informed me the family thinks its time for a routine lab draw and to look at ecerything as they believe he is not his normal self. Please let me know if its ok these labs be ordered, pt has nurse visit 3/1/23 to get labs drawn n our office.

## 2023-02-27 NOTE — TELEPHONE ENCOUNTER
I'm sorry to hear that.  I think it would be a great idea to schedule a physician so I can assess him, and we can obtain labs.

## 2023-02-27 NOTE — TELEPHONE ENCOUNTER
Attempted to contact Melinda and let her know we now have canceled the nurse visit due to  wanting to see him in office and assess. Left message to call our office back.    HUB OK TO READ/ADVISE:  PLEASE SCHEDULE PT AN OFFICE VISIT SOMETIME WITHIN THE NEXT WEEK TO OBTAIN LABS AND DISCUSS. ANYTHING THAT WORKS FOR MELINDA ALBARRAN, SUPERVISOR.

## 2023-02-28 ENCOUNTER — TELEPHONE (OUTPATIENT)
Dept: INTERNAL MEDICINE | Facility: CLINIC | Age: 43
End: 2023-02-28
Payer: MEDICARE

## 2023-02-28 NOTE — TELEPHONE ENCOUNTER
RASHARD NEEDING DX FOR 2000 CALORIE DIET SCRIPT.  RASHARD STATED THE OFFICE NOTE SHOULD SUFFICE. FAXED OFFICE NOTES WITH REASON FOR DIET RESTRICTIONS      FAX # [phone]

## 2023-03-01 ENCOUNTER — OFFICE VISIT (OUTPATIENT)
Dept: INTERNAL MEDICINE | Facility: CLINIC | Age: 43
End: 2023-03-01
Payer: MEDICARE

## 2023-03-01 VITALS
WEIGHT: 183.2 LBS | BODY MASS INDEX: 29.44 KG/M2 | SYSTOLIC BLOOD PRESSURE: 106 MMHG | HEART RATE: 62 BPM | HEIGHT: 66 IN | TEMPERATURE: 97.3 F | DIASTOLIC BLOOD PRESSURE: 72 MMHG | OXYGEN SATURATION: 96 %

## 2023-03-01 DIAGNOSIS — E03.8 OTHER SPECIFIED HYPOTHYROIDISM: ICD-10-CM

## 2023-03-01 DIAGNOSIS — E53.8 B12 DEFICIENCY: ICD-10-CM

## 2023-03-01 DIAGNOSIS — E66.3 OVERWEIGHT (BMI 25.0-29.9): ICD-10-CM

## 2023-03-01 DIAGNOSIS — F70 MILD INTELLECTUAL DISABILITY: ICD-10-CM

## 2023-03-01 DIAGNOSIS — E87.1 HYPONATREMIA: Primary | ICD-10-CM

## 2023-03-01 DIAGNOSIS — Z01.89 ENCOUNTER FOR LABORATORY EXAMINATION: ICD-10-CM

## 2023-03-01 DIAGNOSIS — G40.909 SEIZURE DISORDER: ICD-10-CM

## 2023-03-01 LAB
BASOPHILS # BLD AUTO: 0.01 10*3/MM3 (ref 0–0.2)
BASOPHILS NFR BLD AUTO: 0.2 % (ref 0–1.5)
DEPRECATED RDW RBC AUTO: 43.2 FL (ref 37–54)
EOSINOPHIL # BLD AUTO: 0.06 10*3/MM3 (ref 0–0.4)
EOSINOPHIL NFR BLD AUTO: 1 % (ref 0.3–6.2)
ERYTHROCYTE [DISTWIDTH] IN BLOOD BY AUTOMATED COUNT: 13.1 % (ref 12.3–15.4)
FOLATE SERPL-MCNC: 11.6 NG/ML (ref 4.78–24.2)
HCT VFR BLD AUTO: 45.8 % (ref 37.5–51)
HGB BLD-MCNC: 15.3 G/DL (ref 13–17.7)
IMM GRANULOCYTES # BLD AUTO: 0.01 10*3/MM3 (ref 0–0.05)
IMM GRANULOCYTES NFR BLD AUTO: 0.2 % (ref 0–0.5)
LYMPHOCYTES # BLD AUTO: 2.67 10*3/MM3 (ref 0.7–3.1)
LYMPHOCYTES NFR BLD AUTO: 45.2 % (ref 19.6–45.3)
MCH RBC QN AUTO: 30.2 PG (ref 26.6–33)
MCHC RBC AUTO-ENTMCNC: 33.4 G/DL (ref 31.5–35.7)
MCV RBC AUTO: 90.5 FL (ref 79–97)
MONOCYTES # BLD AUTO: 0.4 10*3/MM3 (ref 0.1–0.9)
MONOCYTES NFR BLD AUTO: 6.8 % (ref 5–12)
NEUTROPHILS NFR BLD AUTO: 2.76 10*3/MM3 (ref 1.7–7)
NEUTROPHILS NFR BLD AUTO: 46.6 % (ref 42.7–76)
NRBC BLD AUTO-RTO: 0 /100 WBC (ref 0–0.2)
PLATELET # BLD AUTO: 145 10*3/MM3 (ref 140–450)
PMV BLD AUTO: 12.4 FL (ref 6–12)
RBC # BLD AUTO: 5.06 10*6/MM3 (ref 4.14–5.8)
VIT B12 BLD-MCNC: 636 PG/ML (ref 211–946)
WBC NRBC COR # BLD: 5.91 10*3/MM3 (ref 3.4–10.8)

## 2023-03-01 PROCEDURE — 85025 COMPLETE CBC W/AUTO DIFF WBC: CPT | Performed by: STUDENT IN AN ORGANIZED HEALTH CARE EDUCATION/TRAINING PROGRAM

## 2023-03-01 PROCEDURE — 80053 COMPREHEN METABOLIC PANEL: CPT | Performed by: STUDENT IN AN ORGANIZED HEALTH CARE EDUCATION/TRAINING PROGRAM

## 2023-03-01 PROCEDURE — 99214 OFFICE O/P EST MOD 30 MIN: CPT | Performed by: STUDENT IN AN ORGANIZED HEALTH CARE EDUCATION/TRAINING PROGRAM

## 2023-03-01 PROCEDURE — 82607 VITAMIN B-12: CPT | Performed by: STUDENT IN AN ORGANIZED HEALTH CARE EDUCATION/TRAINING PROGRAM

## 2023-03-01 PROCEDURE — 82746 ASSAY OF FOLIC ACID SERUM: CPT | Performed by: STUDENT IN AN ORGANIZED HEALTH CARE EDUCATION/TRAINING PROGRAM

## 2023-03-01 PROCEDURE — 84443 ASSAY THYROID STIM HORMONE: CPT | Performed by: STUDENT IN AN ORGANIZED HEALTH CARE EDUCATION/TRAINING PROGRAM

## 2023-03-01 NOTE — PROGRESS NOTES
"Chief Complaint  Follow-up (Wanting lab work/ need DX for calorie restriction script)    Subjective          Jaleel Garcia presents to [facility]  History of Present Illness    Historian: Dari from [facility]    Family requesting labwork today, including B12 and Vitamin D testing.    Family also reportedly with concern that Mr. Garcia is too thin and too pale on 2,000 calorie diet.      Current Outpatient Medications   Medication Instructions   • Calcium Carb-Cholecalciferol (Oyster Shell Calcium w/D) 500-5 MG-MCG tablet TAKE ONE TABLET BY MOUTH EVERY DAY   • Calcium Carbonate-Vitamin D (calcium-vitamin D) 500-200 MG-UNIT tablet per tablet 1 tablet, Oral, Daily   • cetirizine (ZYRTEC) 10 mg, Oral, Daily   • desmopressin (DDAVP) 400 mcg, Oral, Nightly   • divalproex (DEPAKOTE ER) 1,000 mg, Oral, 2 Times Daily   • escitalopram (LEXAPRO) 10 mg, Oral, Daily, To start after titrates off sertraline.   • levETIRAcetam (KEPPRA) 750 MG tablet Take 1 tab in am, and 2 tabs in pm for two weeks. Then take 2 tabs BID thereafter.   • levothyroxine (SYNTHROID, LEVOTHROID) 50 mcg, Oral, Every Early Morning       The following portions of the patient's history were reviewed and updated as appropriate: allergies, current medications, past family history, past medical history, past social history, past surgical history, and problem list.    Objective   Vital Signs:   /72   Pulse 62   Temp 97.3 °F (36.3 °C) (Temporal)   Ht 167.6 cm (66\")   Wt 83.1 kg (183 lb 3.2 oz)   SpO2 96%   BMI 29.57 kg/m²     Wt Readings from Last 3 Encounters:   03/01/23 83.1 kg (183 lb 3.2 oz)   02/16/23 84.2 kg (185 lb 9.6 oz)   12/30/22 85.1 kg (187 lb 9.6 oz)     BP Readings from Last 3 Encounters:   03/01/23 106/72   02/16/23 134/71   12/30/22 99/63     Physical Exam  Vitals reviewed.   Constitutional:       General: He is not in acute distress.     Appearance: Normal appearance. He is not " ill-appearing, toxic-appearing or diaphoretic.   HENT:      Head: Normocephalic and atraumatic.      Right Ear: External ear normal.      Left Ear: External ear normal.   Eyes:      Conjunctiva/sclera: Conjunctivae normal.   Cardiovascular:      Rate and Rhythm: Normal rate and regular rhythm.      Pulses: Normal pulses.      Heart sounds: Normal heart sounds. No murmur heard.    No friction rub. No gallop.   Pulmonary:      Effort: Pulmonary effort is normal. No respiratory distress.      Breath sounds: Normal breath sounds. No stridor. No wheezing, rhonchi or rales.   Chest:      Chest wall: No tenderness.   Abdominal:      General: Abdomen is flat.      Palpations: Abdomen is soft. There is no mass.      Tenderness: There is no abdominal tenderness.   Musculoskeletal:      Right lower leg: No edema.      Left lower leg: No edema.   Skin:     General: Skin is warm and dry.   Neurological:      Mental Status: He is alert. Mental status is at baseline.       Result Review :   The following data was reviewed by: Rico Monzon MD on 03/01/2023:  Common labs    Common Labs 10/6/22 12/30/22 2/16/23   Glucose 73 81 75   BUN 14 14 11   Creatinine 0.59 (A) 0.62 (A) 0.61 (A)   Sodium 135 (A) 131 (A) 136   Potassium 4.7 4.7 5.0   Chloride 98 95 (A) 97 (A)   Calcium 9.1 8.9 9.0   (A) Abnormal value                   Lab Results   Component Value Date    COVID19 NOT DETECTED 06/04/2020    INR 1.10 (L) 12/26/2021    BILIRUBINUR Negative 10/01/2022       Procedures        Assessment and Plan    Diagnoses and all orders for this visit:    1. Hyponatremia (Primary)  -     Comprehensive Metabolic Panel    2. Other specified hypothyroidism  -     TSH    3. B12 deficiency  -     Vitamin B12  -     Folate  -     CBC & Differential    4. Seizure disorder (HCC)    5. Mild intellectual disability    6. Encounter for laboratory examination  -     Vitamin B12  -     Folate  -     CBC & Differential  -     Comprehensive Metabolic  Panel  -     TSH    7. Overweight (BMI 25.0-29.9)      -will hold off on vitamin D testing as no covered indication; family would need to sign a waiver form  -will continue 2,000 calorie diet.  BMI currently 29.57    There are no discontinued medications.       Follow Up   Return if symptoms worsen or fail to improve.  Patient was given instructions and counseling regarding his condition or for health maintenance advice. Please see specific information pulled into the AVS if appropriate.       Rico Monzon MD  03/01/23  17:48 EST

## 2023-03-02 LAB
ALBUMIN SERPL-MCNC: 4.3 G/DL (ref 3.5–5.2)
ALBUMIN/GLOB SERPL: 1.7 G/DL
ALP SERPL-CCNC: 49 U/L (ref 39–117)
ALT SERPL W P-5'-P-CCNC: 16 U/L (ref 1–41)
ANION GAP SERPL CALCULATED.3IONS-SCNC: 6.5 MMOL/L (ref 5–15)
AST SERPL-CCNC: 17 U/L (ref 1–40)
BILIRUB SERPL-MCNC: 0.8 MG/DL (ref 0–1.2)
BUN SERPL-MCNC: 14 MG/DL (ref 6–20)
BUN/CREAT SERPL: 22.6 (ref 7–25)
CALCIUM SPEC-SCNC: 9.4 MG/DL (ref 8.6–10.5)
CHLORIDE SERPL-SCNC: 97 MMOL/L (ref 98–107)
CO2 SERPL-SCNC: 30.5 MMOL/L (ref 22–29)
CREAT SERPL-MCNC: 0.62 MG/DL (ref 0.76–1.27)
EGFRCR SERPLBLD CKD-EPI 2021: 122.4 ML/MIN/1.73
GLOBULIN UR ELPH-MCNC: 2.5 GM/DL
GLUCOSE SERPL-MCNC: 78 MG/DL (ref 65–99)
POTASSIUM SERPL-SCNC: 5.2 MMOL/L (ref 3.5–5.2)
PROT SERPL-MCNC: 6.8 G/DL (ref 6–8.5)
SODIUM SERPL-SCNC: 134 MMOL/L (ref 136–145)
TSH SERPL DL<=0.05 MIU/L-ACNC: 2.02 UIU/ML (ref 0.27–4.2)

## 2023-03-29 ENCOUNTER — TELEPHONE (OUTPATIENT)
Dept: INTERNAL MEDICINE | Facility: CLINIC | Age: 43
End: 2023-03-29
Payer: MEDICARE

## 2023-03-29 NOTE — TELEPHONE ENCOUNTER
Caller: MELINDA    Relationship: [facility] call back number: [phone]    What form or medical record are you requesting: MELINDA IS REQUESTING A STATEMENT FROM OFFICE EXPLAINING WHY THE APPOINTMENT WAS CANCELLED.  SHE IS REQUESTING THIS FOR DOCUMENTATION PURPOSES FOR HER EMPLOYER.    Who is requesting this form or medical record from you: EMPLOYER    How would you like to receive the form or medical records (pick-up, mail, fax):  FAX: [phone]

## 2023-04-06 ENCOUNTER — OFFICE VISIT (OUTPATIENT)
Dept: INTERNAL MEDICINE | Facility: CLINIC | Age: 43
End: 2023-04-06
Payer: MEDICARE

## 2023-04-06 VITALS
BODY MASS INDEX: 29.77 KG/M2 | DIASTOLIC BLOOD PRESSURE: 69 MMHG | WEIGHT: 185.2 LBS | HEIGHT: 66 IN | TEMPERATURE: 97.8 F | SYSTOLIC BLOOD PRESSURE: 106 MMHG | OXYGEN SATURATION: 98 % | HEART RATE: 67 BPM

## 2023-04-06 DIAGNOSIS — Z71.3 ENCOUNTER FOR NUTRITIONAL COUNSELING: Primary | ICD-10-CM

## 2023-04-06 DIAGNOSIS — E66.3 OVERWEIGHT (BMI 25.0-29.9): ICD-10-CM

## 2023-04-06 DIAGNOSIS — F70 MILD INTELLECTUAL DISABILITY: ICD-10-CM

## 2023-04-06 NOTE — PROGRESS NOTES
"Chief Complaint  Follow-up (Needing a referral for nutrition services )    Subjective          Jaleel Garcia presents to [facility]  History of Present Illness    Here with Dari from [facility], who is the historian.    Here to request nutrition consult.  Jaleel' mother with concerns that he isn't getting sufficient calories.  They are working on a new plan of care, and need orders from nutritional services to authorize a new calorie count.      No interim issues.    Current Outpatient Medications   Medication Instructions   • Calcium Carb-Cholecalciferol (Oyster Shell Calcium w/D) 500-5 MG-MCG tablet TAKE ONE TABLET BY MOUTH EVERY DAY   • cetirizine (ZYRTEC) 10 mg, Oral, Daily   • desmopressin (DDAVP) 400 mcg, Oral, Nightly   • divalproex (DEPAKOTE ER) 1,000 mg, Oral, 2 Times Daily   • escitalopram (LEXAPRO) 10 mg, Oral, Daily, To start after titrates off sertraline.   • levETIRAcetam (KEPPRA) 750 MG tablet Take 1 tab in am, and 2 tabs in pm for two weeks. Then take 2 tabs BID thereafter.   • levothyroxine (SYNTHROID, LEVOTHROID) 50 mcg, Oral, Every Early Morning       The following portions of the patient's history were reviewed and updated as appropriate: allergies, current medications, past family history, past medical history, past social history, past surgical history, and problem list.    Objective   Vital Signs:   /69 (BP Location: Right arm, Patient Position: Sitting, Cuff Size: Adult)   Pulse 67   Temp 97.8 °F (36.6 °C) (Temporal)   Ht 167.6 cm (66\")   Wt 84 kg (185 lb 3.2 oz)   SpO2 98%   BMI 29.89 kg/m²     Wt Readings from Last 3 Encounters:   04/06/23 84 kg (185 lb 3.2 oz)   03/01/23 83.1 kg (183 lb 3.2 oz)   02/16/23 84.2 kg (185 lb 9.6 oz)     BP Readings from Last 3 Encounters:   04/06/23 106/69   03/01/23 106/72   02/16/23 134/71     Physical Exam  Vitals reviewed.   Constitutional:       General: He is not in acute distress.     " Appearance: Normal appearance. He is not ill-appearing, toxic-appearing or diaphoretic.   HENT:      Head: Normocephalic and atraumatic.      Right Ear: Tympanic membrane, ear canal and external ear normal.      Left Ear: Tympanic membrane, ear canal and external ear normal.   Eyes:      Conjunctiva/sclera: Conjunctivae normal.   Cardiovascular:      Rate and Rhythm: Normal rate and regular rhythm.      Pulses: Normal pulses.      Heart sounds: Normal heart sounds. No murmur heard.    No friction rub. No gallop.   Pulmonary:      Effort: Pulmonary effort is normal. No respiratory distress.      Breath sounds: Normal breath sounds. No stridor. No wheezing, rhonchi or rales.   Chest:      Chest wall: No tenderness.   Abdominal:      General: Abdomen is flat.      Palpations: Abdomen is soft. There is no mass.      Tenderness: There is no abdominal tenderness.   Musculoskeletal:      Right lower leg: No edema.      Left lower leg: No edema.   Skin:     General: Skin is warm and dry.   Neurological:      General: No focal deficit present.      Mental Status: He is alert. Mental status is at baseline.   Psychiatric:         Mood and Affect: Mood normal.         Behavior: Behavior normal.         Thought Content: Thought content normal.         Judgment: Judgment normal.       Result Review :   The following data was reviewed by: Rico Monzon MD on 04/06/2023:  Common labs    Common Labs 12/30/22 2/16/23 3/1/23 3/1/23      0945 0945   Glucose 81 75  78   BUN 14 11  14   Creatinine 0.62 (A) 0.61 (A)  0.62 (A)   Sodium 131 (A) 136  134 (A)   Potassium 4.7 5.0  5.2   Chloride 95 (A) 97 (A)  97 (A)   Calcium 8.9 9.0  9.4   Albumin    4.3   Total Bilirubin    0.8   Alkaline Phosphatase    49   AST (SGOT)    17   ALT (SGPT)    16   WBC   5.91    Hemoglobin   15.3    Hematocrit   45.8    Platelets   145    (A) Abnormal value                   Lab Results   Component Value Date    COVID19 NOT DETECTED 06/04/2020    INR 1.10  (L) 12/26/2021    BILIRUBINUR Negative 10/01/2022       Procedures        Assessment and Plan    Diagnoses and all orders for this visit:    1. Encounter for nutritional counseling (Primary)    2. Overweight (BMI 25.0-29.9)  -     Ambulatory Referral to Nutrition Services    3. Mild intellectual disability      Overweight, nutritional concerns:  -weight stable, BMI 29  -have placed a nutritional services consult as requested    There are no discontinued medications.       Follow Up   Return if symptoms worsen or fail to improve.  Patient was given instructions and counseling regarding his condition or for health maintenance advice. Please see specific information pulled into the AVS if appropriate.       Rico Monzon MD  04/06/23  09:06 EDT

## 2023-05-04 ENCOUNTER — TELEPHONE (OUTPATIENT)
Dept: INTERNAL MEDICINE | Facility: CLINIC | Age: 43
End: 2023-05-04
Payer: MEDICARE

## 2023-05-04 NOTE — TELEPHONE ENCOUNTER
Caller: MELINDA ALBARRAN    Relationship to patient: Home Health    Best call back number: [phone]    Patient is needing: CALLER STATES THAT SHE WAS WITH PATIENT ON 04.06.2023 ABOUT REFERRAL FOR NUTRITION SERVICES AND THEY HAVE NOT HEARD ANYTHING BACK ABOUT IT YET. CALLER WOULD LIKE TO SPEAK WITH SOMEONE ABOUT IT AS SOON AS POSSIBLE.

## 2023-05-17 ENCOUNTER — OFFICE VISIT (OUTPATIENT)
Dept: INTERNAL MEDICINE | Facility: CLINIC | Age: 43
End: 2023-05-17
Payer: MEDICARE

## 2023-05-17 VITALS
WEIGHT: 184.8 LBS | TEMPERATURE: 98.2 F | OXYGEN SATURATION: 96 % | HEIGHT: 66 IN | BODY MASS INDEX: 29.7 KG/M2 | DIASTOLIC BLOOD PRESSURE: 63 MMHG | HEART RATE: 63 BPM | SYSTOLIC BLOOD PRESSURE: 100 MMHG

## 2023-05-17 DIAGNOSIS — G40.909 SEIZURE DISORDER: ICD-10-CM

## 2023-05-17 DIAGNOSIS — E87.1 HYPONATREMIA: Primary | ICD-10-CM

## 2023-05-17 DIAGNOSIS — F70 MILD INTELLECTUAL DISABILITY: ICD-10-CM

## 2023-05-17 DIAGNOSIS — E03.8 OTHER SPECIFIED HYPOTHYROIDISM: ICD-10-CM

## 2023-05-17 PROBLEM — E53.8 B12 DEFICIENCY: Status: RESOLVED | Noted: 2023-03-01 | Resolved: 2023-05-17

## 2023-05-17 LAB
ALBUMIN SERPL-MCNC: 4.3 G/DL (ref 3.5–5.2)
ALBUMIN/GLOB SERPL: 1.9 G/DL
ALP SERPL-CCNC: 55 U/L (ref 39–117)
ALT SERPL W P-5'-P-CCNC: 15 U/L (ref 1–41)
ANION GAP SERPL CALCULATED.3IONS-SCNC: 9.3 MMOL/L (ref 5–15)
AST SERPL-CCNC: 18 U/L (ref 1–40)
BILIRUB SERPL-MCNC: 0.6 MG/DL (ref 0–1.2)
BUN SERPL-MCNC: 13 MG/DL (ref 6–20)
BUN/CREAT SERPL: 18.3 (ref 7–25)
CALCIUM SPEC-SCNC: 9.2 MG/DL (ref 8.6–10.5)
CHLORIDE SERPL-SCNC: 96 MMOL/L (ref 98–107)
CO2 SERPL-SCNC: 26.7 MMOL/L (ref 22–29)
CREAT SERPL-MCNC: 0.71 MG/DL (ref 0.76–1.27)
EGFRCR SERPLBLD CKD-EPI 2021: 117.5 ML/MIN/1.73
GLOBULIN UR ELPH-MCNC: 2.3 GM/DL
GLUCOSE SERPL-MCNC: 79 MG/DL (ref 65–99)
POTASSIUM SERPL-SCNC: 4.7 MMOL/L (ref 3.5–5.2)
PROT SERPL-MCNC: 6.6 G/DL (ref 6–8.5)
SODIUM SERPL-SCNC: 132 MMOL/L (ref 136–145)
TSH SERPL DL<=0.05 MIU/L-ACNC: 2.52 UIU/ML (ref 0.27–4.2)

## 2023-05-17 PROCEDURE — 84443 ASSAY THYROID STIM HORMONE: CPT | Performed by: STUDENT IN AN ORGANIZED HEALTH CARE EDUCATION/TRAINING PROGRAM

## 2023-05-17 PROCEDURE — 80053 COMPREHEN METABOLIC PANEL: CPT | Performed by: STUDENT IN AN ORGANIZED HEALTH CARE EDUCATION/TRAINING PROGRAM

## 2023-05-17 NOTE — PROGRESS NOTES
"Chief Complaint  Seizures (Follow up)    Subjective          Jaleel Garcia presents to [facility]  History of Present Illness    Here with Dari from [facility].    No interim issue.  Weight stable.   No interim seizures.    Still working on nutritional consult.  Mother reportedly has goal weight for Jaleel of exactly 185 lbs.      Current Outpatient Medications   Medication Instructions   • Calcium Carb-Cholecalciferol (Oyster Shell Calcium w/D) 500-5 MG-MCG tablet TAKE ONE TABLET BY MOUTH EVERY DAY   • cetirizine (ZYRTEC) 10 mg, Oral, Daily   • desmopressin (DDAVP) 400 mcg, Oral, Nightly   • divalproex (DEPAKOTE ER) 1,000 mg, Oral, 2 Times Daily   • escitalopram (LEXAPRO) 10 mg, Oral, Daily, To start after titrates off sertraline.   • levETIRAcetam (KEPPRA) 750 MG tablet Take 1 tab in am, and 2 tabs in pm for two weeks. Then take 2 tabs BID thereafter.   • levothyroxine (SYNTHROID, LEVOTHROID) 50 mcg, Oral, Every Early Morning       The following portions of the patient's history were reviewed and updated as appropriate: allergies, current medications, past family history, past medical history, past social history, past surgical history, and problem list.    Objective   Vital Signs:   /63 (BP Location: Right arm, Patient Position: Sitting)   Pulse 63   Temp 98.2 °F (36.8 °C) (Temporal)   Ht 167.6 cm (66\")   Wt 83.8 kg (184 lb 12.8 oz)   SpO2 96%   BMI 29.83 kg/m²     Wt Readings from Last 3 Encounters:   05/17/23 83.8 kg (184 lb 12.8 oz)   04/06/23 84 kg (185 lb 3.2 oz)   03/01/23 83.1 kg (183 lb 3.2 oz)     BP Readings from Last 3 Encounters:   05/17/23 100/63   04/06/23 106/69   03/01/23 106/72     Physical Exam  Vitals reviewed.   Constitutional:       General: He is not in acute distress.     Appearance: Normal appearance. He is not ill-appearing, toxic-appearing or diaphoretic.   HENT:      Head: Normocephalic and atraumatic.      Right Ear: " External ear normal.      Left Ear: External ear normal.   Eyes:      Conjunctiva/sclera: Conjunctivae normal.   Cardiovascular:      Rate and Rhythm: Normal rate and regular rhythm.      Pulses: Normal pulses.      Heart sounds: Normal heart sounds. No murmur heard.    No friction rub. No gallop.   Pulmonary:      Effort: Pulmonary effort is normal. No respiratory distress.      Breath sounds: Normal breath sounds. No stridor. No wheezing, rhonchi or rales.   Chest:      Chest wall: No tenderness.   Abdominal:      General: Abdomen is flat.      Palpations: Abdomen is soft. There is no mass.      Tenderness: There is no abdominal tenderness.   Musculoskeletal:      Right lower leg: No edema.      Left lower leg: No edema.   Skin:     General: Skin is warm and dry.   Neurological:      Mental Status: He is alert. Mental status is at baseline.   Psychiatric:         Mood and Affect: Mood normal.         Behavior: Behavior normal.         Thought Content: Thought content normal.         Judgment: Judgment normal.          Result Review :   The following data was reviewed by: Rico Monzon MD on 05/17/2023:  Common labs        12/30/2022    11:08 2/16/2023    15:36 3/1/2023    09:45   Common Labs   Glucose 81   75   78     BUN 14   11   14     Creatinine 0.62   0.61   0.62     Sodium 131   136   134     Potassium 4.7   5.0   5.2     Chloride 95   97   97     Calcium 8.9   9.0   9.4     Albumin   4.3     Total Bilirubin   0.8     Alkaline Phosphatase   49     AST (SGOT)   17     ALT (SGPT)   16     WBC   5.91     Hemoglobin   15.3     Hematocrit   45.8     Platelets   145              Lab Results   Component Value Date    COVID19 NOT DETECTED 06/04/2020    INR 1.10 (L) 12/26/2021    BILIRUBINUR Negative 10/01/2022       Procedures        Assessment and Plan    Diagnoses and all orders for this visit:    1. Hyponatremia (Primary)  -     Comprehensive Metabolic Panel    2. Other specified hypothyroidism  -      TSH    3. Mild intellectual disability    4. Seizure disorder      Seizure Disorder:  -stable, will continue current regimen    There are no discontinued medications.       Follow Up   Return in about 4 months (around 9/30/2023) for Medicare Wellness.  Patient was given instructions and counseling regarding his condition or for health maintenance advice. Please see specific information pulled into the AVS if appropriate.       Rico Monzon MD  05/17/23  14:00 EDT

## 2023-05-18 RX ORDER — LEVETIRACETAM 750 MG/1
TABLET ORAL
Qty: 120 TABLET | Refills: 1 | Status: SHIPPED | OUTPATIENT
Start: 2023-05-18

## 2023-06-13 RX ORDER — CETIRIZINE HYDROCHLORIDE 10 MG/1
TABLET ORAL
Qty: 90 TABLET | Refills: 2 | Status: SHIPPED | OUTPATIENT
Start: 2023-06-13

## 2023-06-13 RX ORDER — DIVALPROEX SODIUM 500 MG/1
TABLET, EXTENDED RELEASE ORAL
Qty: 360 TABLET | Refills: 2 | Status: SHIPPED | OUTPATIENT
Start: 2023-06-13

## 2023-07-29 DIAGNOSIS — E03.8 OTHER SPECIFIED HYPOTHYROIDISM: ICD-10-CM

## 2023-07-29 DIAGNOSIS — F32.A DEPRESSION, UNSPECIFIED DEPRESSION TYPE: ICD-10-CM

## 2023-07-31 RX ORDER — ESCITALOPRAM OXALATE 10 MG/1
10 TABLET ORAL DAILY
Qty: 90 TABLET | Refills: 3 | Status: SHIPPED | OUTPATIENT
Start: 2023-07-31

## 2023-07-31 RX ORDER — DESMOPRESSIN ACETATE 0.2 MG/1
0.4 TABLET ORAL NIGHTLY
Qty: 180 TABLET | Refills: 3 | Status: SHIPPED | OUTPATIENT
Start: 2023-07-31

## 2023-07-31 RX ORDER — CETIRIZINE HYDROCHLORIDE 10 MG/1
10 TABLET ORAL DAILY
Qty: 90 TABLET | Refills: 2 | Status: SHIPPED | OUTPATIENT
Start: 2023-07-31

## 2023-07-31 RX ORDER — LEVOTHYROXINE SODIUM 0.05 MG/1
50 TABLET ORAL
Qty: 90 TABLET | Refills: 3 | Status: SHIPPED | OUTPATIENT
Start: 2023-07-31

## 2023-08-28 ENCOUNTER — TELEPHONE (OUTPATIENT)
Dept: INTERNAL MEDICINE | Facility: CLINIC | Age: 43
End: 2023-08-28
Payer: MEDICARE

## 2023-08-28 NOTE — TELEPHONE ENCOUNTER
Caller: MELINDA ALBARRAN WITH [facility]    Relationship to patient: Home Health    Best call back number: [phone]     Patient is needing: MELINDA ALBARRAN CALLING FOR PATIENT. STATES THAT THEY NEED A NEW FLUID INTAKE PROTOCOL FOR THE PATIENT. CALLER UNSURE IF APPOINTMENT IS NEEDED OR IF ANOTHER ONE CAN BE MADE WITHOUT APPOINTMENT.

## 2023-08-30 ENCOUNTER — TELEPHONE (OUTPATIENT)
Dept: INTERNAL MEDICINE | Facility: CLINIC | Age: 43
End: 2023-08-30
Payer: MEDICARE

## 2023-08-30 ENCOUNTER — DOCUMENTATION (OUTPATIENT)
Dept: INTERNAL MEDICINE | Facility: CLINIC | Age: 43
End: 2023-08-30
Payer: MEDICARE

## 2023-08-30 NOTE — TELEPHONE ENCOUNTER
Caller: MELINDA ALBARRAN    Relationship to patient: Other    Best call back number: [phone]       MELINDA LEUNG  CALLING FROM [facility] IS REQUESTING A NEW ORDER FOR DISPOSABLE UNDERWEAR SIZE XL FOR FOUR PER DAY NUMBER 120 AND REFILLS FOR ONE YEAR. VERY CLOSE OF BEING OUT.     PLEASE ADVISE

## 2023-08-30 NOTE — TELEPHONE ENCOUNTER
Written prescription in scan bin.  Please scan, and let [facility] know that it's available to  at their convenience.  If they're needing additional paperwork regarding this, we can discuss at his upcoming appointment.

## 2023-08-31 NOTE — TELEPHONE ENCOUNTER
Prescription is in my scan folder.  Please scan, and let Dari from [facility] know that it is ready for .

## 2023-09-01 NOTE — TELEPHONE ENCOUNTER
Called again today no answer left vm     OKAY FOR HUB TO READ/ADVISE       Prescription is in my scan folder. Please scan, and let Dari from [facility] know that it is ready for .

## 2023-09-06 NOTE — TELEPHONE ENCOUNTER
Called again today no answer left vm      OKAY FOR HUB TO READ/ADVISE         Prescription is in my scan folder. Please scan, and let Dari from [facility] know that it is ready for .

## 2023-09-12 ENCOUNTER — TELEPHONE (OUTPATIENT)
Dept: NEUROLOGY | Facility: CLINIC | Age: 43
End: 2023-09-12

## 2023-09-12 NOTE — TELEPHONE ENCOUNTER
Provider: PJ    Caller: MELINDA     Relationship to Patient:  WITH [facility]    Pharmacy: LISTED    Phone Number: [phone]    Reason for Call: MELINDA WITH [facility] CALLED REGARDING A MISSED DOSE OF MEDICATION ON 9/4/23.  THE PT MISSED HALF A DOSE OF KEPPRA, HE IS SUPPOSED TO TAKE 2 PILLS TWICE A DAY AND ONLY TOOK 1 PILL IN THE MORNING AND 2 AT NIGHT.  PT HAS NOT HAD ANY PROBLEMS OR SYMPTOMS SINCE MISSING THE DOSE. THIS IS JUST AN FYI, MELINDA ONLY NEEDS A CALL BACK IF NECESSARY.     PLEASE REVIEW AND ADVISE     THANK YOU

## 2023-09-19 ENCOUNTER — DOCUMENTATION (OUTPATIENT)
Dept: INTERNAL MEDICINE | Facility: CLINIC | Age: 43
End: 2023-09-19
Payer: MEDICARE

## 2023-09-20 ENCOUNTER — TELEPHONE (OUTPATIENT)
Dept: INTERNAL MEDICINE | Facility: CLINIC | Age: 43
End: 2023-09-20

## 2023-09-20 NOTE — TELEPHONE ENCOUNTER
Attempted to contact patient, left message to call back.    HUB TO READ:  Appointment needs to be rescheduled for today. [facility] was scheduled 1 week too soon.

## 2023-09-20 NOTE — TELEPHONE ENCOUNTER
Appointment has been rescheduled and information for this has been faxed to [facility] as requested.

## 2023-10-03 NOTE — PATIENT INSTRUCTIONS
Cooking With Less Salt  Cooking with less salt is one way to reduce the amount of sodium you get from food. Sodium is one of the elements that make up salt. It is found naturally in foods and is also added to certain foods. Depending on your condition and overall health, your health care provider or dietitian may recommend that you reduce your sodium intake. Most people should have less than 2,300 milligrams (mg) of sodium each day. If you have high blood pressure (hypertension), you may need to limit your sodium to 1,500 mg each day. Follow the tips below to help reduce your sodium intake.  What are tips for eating less sodium?  Reading food labels       Check the food label before buying or using packaged ingredients. Always check the label for the serving size and sodium content.  Look for products with no more than 140 mg of sodium in one serving.  Check the % Daily Value column to see what percent of the daily recommended amount of sodium is provided in one serving of the product. Foods with 5% or less in this column are considered low in sodium. Foods with 20% or higher are considered high in sodium.  Do not choose foods with salt as one of the first three ingredients on the ingredients list. If salt is one of the first three ingredients, it usually means the item is high in sodium.     Shopping  Buy sodium-free or low-sodium products. Look for the following words on food labels:  Low-sodium.  Sodium-free.  Reduced-sodium.  No salt added.  Unsalted.  Always check the sodium content even if foods are labeled as low-sodium or no salt added.  Buy fresh foods.  Cooking  Use herbs, seasonings without salt, and spices as substitutes for salt.  Use sodium-free baking soda when baking.  Sumner, braise, or roast foods to add flavor with less salt.  Avoid adding salt to pasta, rice, or hot cereals.  Drain and rinse canned vegetables, beans, and meat before use.  Avoid adding salt when cooking sweets and desserts.  Cook  "with low-sodium ingredients.  What foods are high in sodium?  Vegetables  Regular canned vegetables (not low-sodium or reduced-sodium). Sauerkraut, pickled vegetables, and relishes. Olives. French fries. Onion rings. Regular canned tomato sauce and paste. Regular tomato and vegetable juice. Frozen vegetables in sauces.  Grains  Instant hot cereals. Bread stuffing, pancake, and biscuit mixes. Croutons. Seasoned rice or pasta mixes. Noodle soup cups. Boxed or frozen macaroni and cheese. Regular salted crackers. Self-rising flour. Rolls. Bagels. Flour tortillas and wraps.  Meats and other proteins  Meat or fish that is salted, canned, smoked, cured, spiced, or pickled. This includes santiago, ham, sausages, hot dogs, corned beef, chipped beef, meat loaves, salt pork, jerky, pickled herring, anchovies, regular canned tuna, and sardines. Salted nuts.  Dairy  Processed cheese and cheese spreads. Cheese curds. Blue cheese. Feta cheese. String cheese. Regular cottage cheese. Buttermilk. Canned milk.  The items listed above may not be a complete list of foods high in sodium. Actual amounts of sodium may be different depending on processing. Contact a dietitian for more information.  What foods are low in sodium?  Fruits  Fresh, frozen, or canned fruit with no sauce added. Fruit juice.  Vegetables  Fresh or frozen vegetables with no sauce added. \"No salt added\" canned vegetables. \"No salt added\" tomato sauce and paste. Low-sodium or reduced-sodium tomato and vegetable juice.  Grains  Noodles, pasta, quinoa, rice. Shredded or puffed wheat or puffed rice. Regular or quick oats (not instant). Low-sodium crackers. Low-sodium bread. Whole-grain bread and whole-grain pasta. Unsalted popcorn.  Meats and other proteins  Fresh or frozen whole meats, poultry (not injected with sodium), and fish with no sauce added. Unsalted nuts. Dried peas, beans, and lentils without added salt. Unsalted canned beans. Eggs. Unsalted nut butters. " Low-sodium canned tuna or chicken.  Dairy  Milk. Soy milk. Yogurt. Low-sodium cheeses, such as Swiss, Eugene Clement, mozzarella, and ricotta. Sherbet or ice cream (keep to ½ cup per serving). Cream cheese.  Fats and oils  Unsalted butter or margarine.  Other foods  Homemade pudding. Sodium-free baking soda and baking powder. Herbs and spices. Low-sodium seasoning mixes.  Beverages  Coffee and tea. Carbonated beverages.  The items listed above may not be a complete list of foods low in sodium. Actual amounts of sodium may be different depending on processing. Contact a dietitian for more information.  What are some salt alternatives when cooking?  The following are herbs, seasonings, and spices that can be used instead of salt to flavor your food. Herbs should be fresh or dried. Do not choose packaged mixes. Next to the name of the herb, spice, or seasoning are some examples of foods you can pair it with.  Herbs  Bay leaves - Soups, meat and vegetable dishes, and spaghetti sauce.  Basil - Italian dishes, soups, pasta, and fish dishes.  Cilantro - Meat, poultry, and vegetable dishes.  Chili powder - Marinades and Mexican dishes.  Chives - Salad dressings and potato dishes.  Cumin - Mexican dishes, couscous, and meat dishes.  Dill - Fish dishes, sauces, and salads.  Fennel - Meat and vegetable dishes, breads, and cookies.  Garlic (do not use garlic salt) - Italian dishes, meat dishes, salad dressings, and sauces.  Marjoram - Soups, potato dishes, and meat dishes.  Oregano - Pizza and spaghetti sauce.  Parsley - Salads, soups, pasta, and meat dishes.  Quynh - Italian dishes, salad dressings, soups, and red meats.  Saffron - Fish dishes, pasta, and some poultry dishes.  Tony - Stuffings and sauces.  Tarragon - Fish and poultry dishes.  Thyme - Stuffing, meat, and fish dishes.  Seasonings  Lemon juice - Fish dishes, poultry dishes, vegetables, and salads.  Vinegar - Salad dressings, vegetables, and fish  "dishes.  Spices  Cinnamon - Sweet dishes, such as cakes, cookies, and puddings.  Cloves - Gingerbread, puddings, and marinades for meats.  Shukla - Vegetable dishes, fish and poultry dishes, and stir-mello dishes.  Martha - Vegetable dishes, fish dishes, and stir-mello dishes.  Nutmeg - Pasta, vegetables, poultry, fish dishes, and custard.  Summary  Cooking with less salt is one way to reduce the amount of sodium that you get from food.  Buy sodium-free or low-sodium products.  Check the food label before using or buying packaged ingredients.  Use herbs, seasonings without salt, and spices as substitutes for salt in foods.  This information is not intended to replace advice given to you by your health care provider. Make sure you discuss any questions you have with your health care provider.  Document Revised: 12/09/2020 Document Reviewed: 12/09/2020  [employer] Patient Education © 2021 [employer]      https://www.nhlbi.nih.gov/files/docs/public/heart/dash_brief.pdf\">   DASH Eating Plan  DASH stands for Dietary Approaches to Stop Hypertension. The DASH eating plan is a healthy eating plan that has been shown to:  Reduce high blood pressure (hypertension).  Reduce your risk for type 2 diabetes, heart disease, and stroke.  Help with weight loss.  What are tips for following this plan?  Reading food labels  Check food labels for the amount of salt (sodium) per serving. Choose foods with less than 5 percent of the Daily Value of sodium. Generally, foods with less than 300 milligrams (mg) of sodium per serving fit into this eating plan.  To find whole grains, look for the word \"whole\" as the first word in the ingredient list.  Shopping  Buy products labeled as \"low-sodium\" or \"no salt added.\"  Buy fresh foods. Avoid canned foods and pre-made or frozen meals.  Cooking  Avoid adding salt when cooking. Use salt-free seasonings or herbs instead of table salt or sea salt. Check with your health care provider or pharmacist before " using salt substitutes.  Do not mello foods. Cook foods using healthy methods such as baking, boiling, grilling, roasting, and broiling instead.  Cook with heart-healthy oils, such as olive, canola, avocado, soybean, or sunflower oil.  Meal planning       Eat a balanced diet that includes:  4 or more servings of fruits and 4 or more servings of vegetables each day. Try to fill one-half of your plate with fruits and vegetables.  6-8 servings of whole grains each day.  Less than 6 oz (170 g) of lean meat, poultry, or fish each day. A 3-oz (85-g) serving of meat is about the same size as a deck of cards. One egg equals 1 oz (28 g).  2-3 servings of low-fat dairy each day. One serving is 1 cup (237 mL).  1 serving of nuts, seeds, or beans 5 times each week.  2-3 servings of heart-healthy fats. Healthy fats called omega-3 fatty acids are found in foods such as walnuts, flaxseeds, fortified milks, and eggs. These fats are also found in cold-water fish, such as sardines, salmon, and mackerel.  Limit how much you eat of:  Canned or prepackaged foods.  Food that is high in trans fat, such as some fried foods.  Food that is high in saturated fat, such as fatty meat.  Desserts and other sweets, sugary drinks, and other foods with added sugar.  Full-fat dairy products.  Do not salt foods before eating.  Do not eat more than 4 egg yolks a week.  Try to eat at least 2 vegetarian meals a week.  Eat more home-cooked food and less restaurant, buffet, and fast food.     Lifestyle  When eating at a restaurant, ask that your food be prepared with less salt or no salt, if possible.  If you drink alcohol:  Limit how much you use to:  0-1 drink a day for women who are not pregnant.  0-2 drinks a day for men.  Be aware of how much alcohol is in your drink. In the [place] one drink equals one 12 oz bottle of beer (355 mL), one 5 oz glass of wine (148 mL), or one 1½ oz glass of hard liquor (44 mL).  General information  Avoid eating more than  2,300 mg of salt a day. If you have hypertension, you may need to reduce your sodium intake to 1,500 mg a day.  Work with your health care provider to maintain a healthy body weight or to lose weight. Ask what an ideal weight is for you.  Get at least 30 minutes of exercise that causes your heart to beat faster (aerobic exercise) most days of the week. Activities may include walking, swimming, or biking.  Work with your health care provider or dietitian to adjust your eating plan to your individual calorie needs.  What foods should I eat?  Fruits  All fresh, dried, or frozen fruit. Canned fruit in natural juice (without added sugar).  Vegetables  Fresh or frozen vegetables (raw, steamed, roasted, or grilled). Low-sodium or reduced-sodium tomato and vegetable juice. Low-sodium or reduced-sodium tomato sauce and tomato paste. Low-sodium or reduced-sodium canned vegetables.  Grains  Whole-grain or whole-wheat bread. Whole-grain or whole-wheat pasta. Brown rice. Oatmeal. Quinoa. Bulgur. Whole-grain and low-sodium cereals. Kaycee bread. Low-fat, low-sodium crackers. Whole-wheat flour tortillas.  Meats and other proteins  Skinless chicken or turkey. Ground chicken or turkey. Pork with fat trimmed off. Fish and seafood. Egg whites. Dried beans, peas, or lentils. Unsalted nuts, nut butters, and seeds. Unsalted canned beans. Lean cuts of beef with fat trimmed off. Low-sodium, lean precooked or cured meat, such as sausages or meat loaves.  Dairy  Low-fat (1%) or fat-free (skim) milk. Reduced-fat, low-fat, or fat-free cheeses. Nonfat, low-sodium ricotta or cottage cheese. Low-fat or nonfat yogurt. Low-fat, low-sodium cheese.  Fats and oils  Soft margarine without trans fats. Vegetable oil. Reduced-fat, low-fat, or light mayonnaise and salad dressings (reduced-sodium). Canola, safflower, olive, avocado, soybean, and sunflower oils. Avocado.  Seasonings and condiments  Herbs. Spices. Seasoning mixes without salt.  Other  foods  Unsalted popcorn and pretzels. Fat-free sweets.  The items listed above may not be a complete list of foods and beverages you can eat. Contact a dietitian for more information.  What foods should I avoid?  Fruits  Canned fruit in a light or heavy syrup. Fried fruit. Fruit in cream or butter sauce.  Vegetables  Creamed or fried vegetables. Vegetables in a cheese sauce. Regular canned vegetables (not low-sodium or reduced-sodium). Regular canned tomato sauce and paste (not low-sodium or reduced-sodium). Regular tomato and vegetable juice (not low-sodium or reduced-sodium). Pickles. Olives.  Grains  Baked goods made with fat, such as croissants, muffins, or some breads. Dry pasta or rice meal packs.  Meats and other proteins  Fatty cuts of meat. Ribs. Fried meat. Villegas. Bologna, salami, and other precooked or cured meats, such as sausages or meat loaves. Fat from the back of a pig (fatback). Bratwurst. Salted nuts and seeds. Canned beans with added salt. Canned or smoked fish. Whole eggs or egg yolks. Chicken or turkey with skin.  Dairy  Whole or 2% milk, cream, and half-and-half. Whole or full-fat cream cheese. Whole-fat or sweetened yogurt. Full-fat cheese. Nondairy creamers. Whipped toppings. Processed cheese and cheese spreads.  Fats and oils  Butter. Stick margarine. Lard. Shortening. Ghee. Villegas fat. Tropical oils, such as coconut, palm kernel, or palm oil.  Seasonings and condiments  Onion salt, garlic salt, seasoned salt, table salt, and sea salt. Worcestershire sauce. Tartar sauce. Barbecue sauce. Teriyaki sauce. Soy sauce, including reduced-sodium. Steak sauce. Canned and packaged gravies. Fish sauce. Oyster sauce. Cocktail sauce. Store-bought horseradish. Ketchup. Mustard. Meat flavorings and tenderizers. Bouillon cubes. Hot sauces. Pre-made or packaged marinades. Pre-made or packaged taco seasonings. Relishes. Regular salad dressings.  Other foods  Salted popcorn and pretzels.  The items listed above  may not be a complete list of foods and beverages you should avoid. Contact a dietitian for more information.  Where to find more information  National Heart, Lung, and Blood [place]: www.nhlbi.nih.gov  American Heart Association: www.heart.org  Academy of Nutrition and Dietetics: www.eatright.org  National Kidney Foundation: www.kidney.org  Summary  The DASH eating plan is a healthy eating plan that has been shown to reduce high blood pressure (hypertension). It may also reduce your risk for type 2 diabetes, heart disease, and stroke.  When on the DASH eating plan, aim to eat more fresh fruits and vegetables, whole grains, lean proteins, low-fat dairy, and heart-healthy fats.  With the DASH eating plan, you should limit salt (sodium) intake to 2,300 mg a day. If you have hypertension, you may need to reduce your sodium intake to 1,500 mg a day.  Work with your health care provider or dietitian to adjust your eating plan to your individual calorie needs.  This information is not intended to replace advice given to you by your health care provider. Make sure you discuss any questions you have with your health care provider.  Document Revised: 11/20/2020 Document Reviewed: 11/20/2020  [employer] Patient Education © 2021 [employer].       Heart-Healthy Eating Plan  Heart-healthy meal planning includes:  Eating less unhealthy fats.  Eating more healthy fats.  Making other changes in your diet.  Talk with your doctor or a diet specialist (dietitian) to create an eating plan that is right for you.  What is my plan?  Your doctor may recommend an eating plan that includes:  Total fat: ______% or less of total calories a day.  Saturated fat: ______% or less of total calories a day.  Cholesterol: less than _________mg a day.  What are tips for following this plan?  Cooking  Avoid frying your food. Try to bake, boil, grill, or broil it instead. You can also reduce fat by:  Removing the skin from poultry.  Removing all  visible fats from meats.  Steaming vegetables in water or broth.  Meal planning       At meals, divide your plate into four equal parts:  Fill one-half of your plate with vegetables and green salads.  Fill one-fourth of your plate with whole grains.  Fill one-fourth of your plate with lean protein foods.  Eat 4-5 servings of vegetables per day. A serving of vegetables is:  1 cup of raw or cooked vegetables.  2 cups of raw leafy greens.  Eat 4-5 servings of fruit per day. A serving of fruit is:  1 medium whole fruit.  ¼ cup of dried fruit.  ½ cup of fresh, frozen, or canned fruit.  ½ cup of 100% fruit juice.  Eat more foods that have soluble fiber. These are apples, broccoli, carrots, beans, peas, and barley. Try to get 20-30 g of fiber per day.  Eat 4-5 servings of nuts, legumes, and seeds per week:  1 serving of dried beans or legumes equals ½ cup after being cooked.  1 serving of nuts is ¼ cup.  1 serving of seeds equals 1 tablespoon.     General information  Eat more home-cooked food. Eat less restaurant, buffet, and fast food.  Limit or avoid alcohol.  Limit foods that are high in starch and sugar.  Avoid fried foods.  Lose weight if you are overweight.  Keep track of how much salt (sodium) you eat. This is important if you have high blood pressure. Ask your doctor to tell you more about this.  Try to add vegetarian meals each week.  Fats  Choose healthy fats. These include olive oil and canola oil, flaxseeds, walnuts, almonds, and seeds.  Eat more omega-3 fats. These include salmon, mackerel, sardines, tuna, flaxseed oil, and ground flaxseeds. Try to eat fish at least 2 times each week.  Check food labels. Avoid foods with trans fats or high amounts of saturated fat.  Limit saturated fats.  These are often found in animal products, such as meats, butter, and cream.  These are also found in plant foods, such as palm oil, palm kernel oil, and coconut oil.  Avoid foods with partially hydrogenated oils in them.  These have trans fats. Examples are stick margarine, some tub margarines, cookies, crackers, and other baked goods.  What foods can I eat?  Fruits  All fresh, canned (in natural juice), or frozen fruits.  Vegetables  Fresh or frozen vegetables (raw, steamed, roasted, or grilled). Green salads.  Grains  Most grains. Choose whole wheat and whole grains most of the time. Rice and pasta, including brown rice and pastas made with whole wheat.  Meats and other proteins  Lean, well-trimmed beef, veal, pork, and lamb. Chicken and turkey without skin. All fish and shellfish. Wild duck, rabbit, pheasant, and venison. Egg whites or low-cholesterol egg substitutes. Dried beans, peas, lentils, and tofu. Seeds and most nuts.  Dairy  Low-fat or nonfat cheeses, including ricotta and mozzarella. Skim or 1% milk that is liquid, powdered, or evaporated. Buttermilk that is made with low-fat milk. Nonfat or low-fat yogurt.  Fats and oils  Non-hydrogenated (trans-free) margarines. Vegetable oils, including soybean, sesame, sunflower, olive, peanut, safflower, corn, canola, and cottonseed. Salad dressings or mayonnaise made with a vegetable oil.  Beverages  Mineral water. Coffee and tea. Diet carbonated beverages.  Sweets and desserts  Sherbet, gelatin, and fruit ice. Small amounts of dark chocolate.  Limit all sweets and desserts.  Seasonings and condiments  All seasonings and condiments.  The items listed above may not be a complete list of foods and drinks you can eat. Contact a dietitian for more options.  What foods should I avoid?  Fruits  Canned fruit in heavy syrup. Fruit in cream or butter sauce. Fried fruit. Limit coconut.  Vegetables  Vegetables cooked in cheese, cream, or butter sauce. Fried vegetables.  Grains  Breads that are made with saturated or trans fats, oils, or whole milk. Croissants. Sweet rolls. Donuts. High-fat crackers, such as cheese crackers.  Meats and other proteins  Fatty meats, such as hot dogs, ribs,  sausage, santiago, rib-eye roast or steak. High-fat deli meats, such as salami and bologna. Caviar. Domestic duck and goose. Organ meats, such as liver.  Dairy  Cream, sour cream, cream cheese, and creamed cottage cheese. Whole-milk cheeses. Whole or 2% milk that is liquid, evaporated, or condensed. Whole buttermilk. Cream sauce or high-fat cheese sauce. Yogurt that is made from whole milk.  Fats and oils  Meat fat, or shortening. Cocoa butter, hydrogenated oils, palm oil, coconut oil, palm kernel oil. Solid fats and shortenings, including santiago fat, salt pork, lard, and butter. Nondairy cream substitutes. Salad dressings with cheese or sour cream.  Beverages  Regular sodas and juice drinks with added sugar.  Sweets and desserts  Frosting. Pudding. Cookies. Cakes. Pies. Milk chocolate or white chocolate. Buttered syrups. Full-fat ice cream or ice cream drinks.  The items listed above may not be a complete list of foods and drinks to avoid. Contact a dietitian for more information.  Summary  Heart-healthy meal planning includes eating less unhealthy fats, eating more healthy fats, and making other changes in your diet.  Eat a balanced diet. This includes fruits and vegetables, low-fat or nonfat dairy, lean protein, nuts and legumes, whole grains, and heart-healthy oils and fats.  This information is not intended to replace advice given to you by your health care provider. Make sure you discuss any questions you have with your health care provider.  Document Revised: 02/21/2019 Document Reviewed: 01/25/2019  Elsevier Patient Education © 2021 [employer].       Mediterranean Diet  A Mediterranean diet refers to food and lifestyle choices that are based on the traditions of countries located on the [place]. This way of eating has been shown to help prevent certain conditions and improve outcomes for people who have chronic diseases, like kidney disease and heart disease.  What are tips for following this  plan?  Lifestyle  Cook and eat meals together with your family, when possible.  Drink enough fluid to keep your urine clear or pale yellow.  Be physically active every day. This includes:  Aerobic exercise like running or swimming.  Leisure activities like gardening, walking, or housework.  Get 7-8 hours of sleep each night.  If recommended by your health care provider, drink red wine in moderation. This means 1 glass a day for nonpregnant women and 2 glasses a day for men. A glass of wine equals 5 oz (150 mL).  Reading food labels       Check the serving size of packaged foods. For foods such as rice and pasta, the serving size refers to the amount of cooked product, not dry.  Check the total fat in packaged foods. Avoid foods that have saturated fat or trans fats.  Check the ingredients list for added sugars, such as corn syrup.     Shopping  At the grocery store, buy most of your food from the areas near the walls of the store. This includes:  Fresh fruits and vegetables (produce).  Grains, beans, nuts, and seeds. Some of these may be available in unpackaged forms or large amounts (in bulk).  Fresh seafood.  Poultry and eggs.  Low-fat dairy products.  Buy whole ingredients instead of prepackaged foods.  Buy fresh fruits and vegetables in-season from local farmers markets.  Buy frozen fruits and vegetables in resealable bags.  If you do not have access to quality fresh seafood, buy precooked frozen shrimp or canned fish, such as tuna, salmon, or sardines.  Buy small amounts of raw or cooked vegetables, salads, or olives from the deli or salad bar at your store.  Stock your pantry so you always have certain foods on hand, such as olive oil, canned tuna, canned tomatoes, rice, pasta, and beans.  Cooking  Cook foods with extra-virgin olive oil instead of using butter or other vegetable oils.  Have meat as a side dish, and have vegetables or grains as your main dish. This means having meat in small portions or adding  small amounts of meat to foods like pasta or stew.  Use beans or vegetables instead of meat in common dishes like chili or lasagna.  [place] with different cooking methods. Try roasting or broiling vegetables instead of steaming or sautéeing them.  Add frozen vegetables to soups, stews, pasta, or rice.  Add nuts or seeds for added healthy fat at each meal. You can add these to yogurt, salads, or vegetable dishes.  Marinate fish or vegetables using olive oil, lemon juice, garlic, and fresh herbs.  Meal planning       Plan to eat 1 vegetarian meal one day each week. Try to work up to 2 vegetarian meals, if possible.  Eat seafood 2 or more times a week.  Have healthy snacks readily available, such as:  Vegetable sticks with hummus.  Greek yogurt.  Fruit and nut trail mix.  Eat balanced meals throughout the week. This includes:  Fruit: 2-3 servings a day  Vegetables: 4-5 servings a day  Low-fat dairy: 2 servings a day  Fish, poultry, or lean meat: 1 serving a day  Beans and legumes: 2 or more servings a week  Nuts and seeds: 1-2 servings a day  Whole grains: 6-8 servings a day  Extra-virgin olive oil: 3-4 servings a day  Limit red meat and sweets to only a few servings a month     What are my food choices?  Mediterranean diet  Recommended  Grains: Whole-grain pasta. Brown rice. Bulgar wheat. Polenta. Couscous. Whole-wheat bread. Oatmeal. Quinoa.  Vegetables: Artichokes. Beets. Broccoli. Cabbage. Carrots. Eggplant. Green beans. Chard. Kale. Spinach. Onions. Leeks. Peas. Squash. Tomatoes. Peppers. Radishes.  Fruits: Apples. Apricots. Avocado. Berries. Bananas. Cherries. Dates. Figs. Grapes. Minor. Melon. Oranges. Peaches. Plums. Pomegranate.  Meats and other protein foods: Beans. Almonds. Sunflower seeds. Pine nuts. Peanuts. Cod. [place]. Scallops. Shrimp. Tuna. Tilapia. Clams. Oysters. Eggs.  Dairy: Low-fat milk. Cheese. Greek yogurt.  Beverages: Water. Red wine. Herbal tea.  Fats and oils: Extra virgin olive oil.  Avocado oil. Grape seed oil.  Sweets and desserts: Greek yogurt with honey. Baked apples. Poached pears. Trail mix.  Seasoning and other foods: Basil. Cilantro. Coriander. Cumin. Mint. Parsley. Tony. Rosemary. Tarragon. Garlic. Oregano. Thyme. Pepper. Balsalmic vinegar. Tahini. Hummus. Tomato sauce. Olives. Mushrooms.  Limit these  Grains: Prepackaged pasta or rice dishes. Prepackaged cereal with added sugar.  Vegetables: Deep fried potatoes (french fries).  Fruits: Fruit canned in syrup.  Meats and other protein foods: Beef. Pork. Lamb. Poultry with skin. Hot dogs. Villegas.  Dairy: Ice cream. Sour cream. Whole milk.  Beverages: Juice. Sugar-sweetened soft drinks. Beer. Liquor and spirits.  Fats and oils: Butter. Canola oil. Vegetable oil. Beef fat (tallow). Lard.  Sweets and desserts: Cookies. Cakes. Pies. Candy.  Seasoning and other foods: Mayonnaise. Premade sauces and marinades.  The items listed may not be a complete list. Talk with your dietitian about what dietary choices are right for you.  Summary  The Mediterranean diet includes both food and lifestyle choices.  Eat a variety of fresh fruits and vegetables, beans, nuts, seeds, and whole grains.  Limit the amount of red meat and sweets that you eat.  Talk with your health care provider about whether it is safe for you to drink red wine in moderation. This means 1 glass a day for nonpregnant women and 2 glasses a day for men. A glass of wine equals 5 oz (150 mL).  This information is not intended to replace advice given to you by your health care provider. Make sure you discuss any questions you have with your health care provider.  Document Revised: 08/17/2017 Document Reviewed: 08/10/2017  Elsevier Patient Education © 2020 [employer]         Exercising to Stay Healthy  To become healthy and stay healthy, it is recommended that you do moderate-intensity and vigorous-intensity exercise. You can tell that you are exercising at a moderate intensity if your  heart starts beating faster and you start breathing faster but can still hold a conversation. You can tell that you are exercising at a vigorous intensity if you are breathing much harder and faster and cannot hold a conversation while exercising.  Exercising regularly is important. It has many health benefits, such as:  Improving overall fitness, flexibility, and endurance.  Increasing bone density.  Helping with weight control.  Decreasing body fat.  Increasing muscle strength.  Reducing stress and tension.  Improving overall health.  How often should I exercise?  Choose an activity that you enjoy, and set realistic goals. Your health care provider can help you make an activity plan that works for you.  Exercise regularly as told by your health care provider. This may include:  Doing strength training two times a week, such as:  Lifting weights.  Using resistance bands.  Push-ups.  Sit-ups.  Yoga.  Doing a certain intensity of exercise for a given amount of time. Choose from these options:  A total of 150 minutes of moderate-intensity exercise every week.  A total of 75 minutes of vigorous-intensity exercise every week.  A mix of moderate-intensity and vigorous-intensity exercise every week.  Children, pregnant women, people who have not exercised regularly, people who are overweight, and older adults may need to talk with a health care provider about what activities are safe to do. If you have a medical condition, be sure to talk with your health care provider before you start a new exercise program.  What are some exercise ideas?    Moderate-intensity exercise ideas include:  Walking 1 mile (1.6 km) in about 15 minutes.  Biking.  Hiking.  Golfing.  Dancing.  Water aerobics.  Vigorous-intensity exercise ideas include:  Walking 4.5 miles (7.2 km) or more in about 1 hour.  Jogging or running 5 miles (8 km) in about 1 hour.  Biking 10 miles (16.1 km) or more in about 1 hour.  Lap swimming.  Roller-skating or in-line  skating.  Cross-country skiing.  Vigorous competitive sports, such as football, basketball, and soccer.  Jumping rope.  Aerobic dancing.  What are some everyday activities that can help me to get exercise?  Yard work, such as:  Pushing a .  Raking and bagging leaves.  Washing your car.  Pushing a stroller.  Shoveling snow.  Gardening.  Washing windows or floors.  How can I be more active in my day-to-day activities?  Use stairs instead of an elevator.  Take a walk during your lunch break.  If you drive, park your car farther away from your work or school.  If you take public transportation, get off one stop early and walk the rest of the way.  Stand up or walk around during all of your indoor phone calls.  Get up, stretch, and walk around every 30 minutes throughout the day.  Enjoy exercise with a friend. Support to continue exercising will help you keep a regular routine of activity.  What guidelines can I follow while exercising?  Before you start a new exercise program, talk with your health care provider.  Do not exercise so much that you hurt yourself, feel dizzy, or get very short of breath.  Wear comfortable clothes and wear shoes with good support.  Drink plenty of water while you exercise to prevent dehydration or heat stroke.  Work out until your breathing and your heartbeat get faster.  Where to find more information  [place] Department of Health and Human Services: www.hhs.gov  Centers for Disease Control and Prevention (CDC): www.cdc.gov  Summary  Exercising regularly is important. It will improve your overall fitness, flexibility, and endurance.  Regular exercise also will improve your overall health. It can help you control your weight, reduce stress, and improve your bone density.  Do not exercise so much that you hurt yourself, feel dizzy, or get very short of breath.  Before you start a new exercise program, talk with your health care provider.  This information is not intended to replace  advice given to you by your health care provider. Make sure you discuss any questions you have with your health care provider.  Document Revised: 11/30/2018 Document Reviewed: 11/08/2018  Elsevier Patient Education © 2021 [employer]           Mindfulness-Based Stress Reduction  Mindfulness-based stress reduction (MBSR) is a program that helps people learn to practice mindfulness. Mindfulness is the practice of intentionally paying attention to the present moment. It can be learned and practiced through techniques such as education, breathing exercises, meditation, and yoga. MBSR includes several mindfulness techniques in one program.  MBSR works best when you understand the treatment, are willing to try new things, and can commit to spending time practicing what you learn. MBSR training may include learning about:  How your emotions, thoughts, and reactions affect your body.  New ways to respond to things that cause negative thoughts to start (triggers).  How to notice your thoughts and let go of them.  Practicing awareness of everyday things that you normally do without thinking.  The techniques and goals of different types of meditation.  What are the benefits of MBSR?  MBSR can have many benefits, which include helping you to:  Develop self-awareness. This refers to knowing and understanding yourself.  Learn skills and attitudes that help you to participate in your own health care.  Learn new ways to care for yourself.  Be more accepting about how things are, and let things go.  Be less judgmental and approach things with an open mind.  Be patient with yourself and trust yourself more.  MBSR has also been shown to:  Reduce negative emotions, such as depression and anxiety.  Improve memory and focus.  Change how you sense and approach pain.  Boost your body's ability to fight infections.  Help you connect better with other people.  Improve your sense of well-being.  Follow these instructions at home:       Find  a local in-person or online MBSR program.  Set aside some time regularly for mindfulness practice.  Find a mindfulness practice that works best for you. This may include one or more of the following:  Meditation. Meditation involves focusing your mind on a certain thought or activity.  Breathing awareness exercises. These help you to stay present by focusing on your breath.  Body scan. For this practice, you lie down and pay attention to each part of your body from head to toe. You can identify tension and soreness and intentionally relax parts of your body.  Yoga. Yoga involves stretching and breathing, and it can improve your ability to move and be flexible. It can also provide an experience of testing your body's limits, which can help you release stress.  Mindful eating. This way of eating involves focusing on the taste, texture, color, and smell of each bite of food. Because this slows down eating and helps you feel full sooner, it can be an important part of a weight-loss plan.  Find a podcast or recording that provides guidance for breathing awareness, body scan, or meditation exercises. You can listen to these any time when you have a free moment to rest without distractions.  Follow your treatment plan as told by your health care provider. This may include taking regular medicines and making changes to your diet or lifestyle as recommended.  How to practice mindfulness  To do a basic awareness exercise:  Find a comfortable place to sit.  Pay attention to the present moment. Observe your thoughts, feelings, and surroundings just as they are.  Avoid placing judgment on yourself, your feelings, or your surroundings. Make note of any judgment that comes up, and let it go.  Your mind may wander, and that is okay. Make note of when your thoughts drift, and return your attention to the present moment.  To do basic mindfulness meditation:  Find a comfortable place to sit. This may include a stable chair or a firm  floor cushion.  Sit upright with your back straight. Let your arms fall next to your side with your hands resting on your legs.  If sitting in a chair, rest your feet flat on the floor.  If sitting on a cushion, cross your legs in front of you.  Keep your head in a neutral position with your chin dropped slightly. Relax your jaw and rest the tip of your tongue on the roof of your mouth. Drop your gaze to the floor. You can close your eyes if you like.  Breathe normally and pay attention to your breath. Feel the air moving in and out of your nose. Feel your belly expanding and relaxing with each breath.  Your mind may wander, and that is okay. Make note of when your thoughts drift, and return your attention to your breath.  Avoid placing judgment on yourself, your feelings, or your surroundings. Make note of any judgment or feelings that come up, let them go, and bring your attention back to your breath.  When you are ready, lift your gaze or open your eyes. Pay attention to how your body feels after the meditation.  Where to find more information  You can find more information about MBSR from:  Your health care provider.  Community-based meditation centers or programs.  Programs offered near you.  Summary  Mindfulness-based stress reduction (MBSR) is a program that teaches you how to intentionally pay attention to the present moment. It is used with other treatments to help you cope better with daily stress, emotions, and pain.  MBSR focuses on developing self-awareness, which allows you to respond to life stress without judgment or negative emotions.  MBSR programs may involve learning different mindfulness practices, such as breathing exercises, meditation, yoga, body scan, or mindful eating. Find a mindfulness practice that works best for you, and set aside time for it on a regular basis.  This information is not intended to replace advice given to you by your health care provider. Make sure you discuss any  questions you have with your health care provider.  Document Revised: 02/22/2021 Document Reviewed: 04/26/2018  Elsevier Patient Education © 2021 [employer]

## 2023-10-04 ENCOUNTER — OFFICE VISIT (OUTPATIENT)
Dept: INTERNAL MEDICINE | Facility: CLINIC | Age: 43
End: 2023-10-04
Payer: MEDICARE

## 2023-10-04 ENCOUNTER — LAB (OUTPATIENT)
Dept: LAB | Facility: HOSPITAL | Age: 43
End: 2023-10-04
Payer: MEDICARE

## 2023-10-04 VITALS
OXYGEN SATURATION: 96 % | TEMPERATURE: 97.7 F | BODY MASS INDEX: 30.05 KG/M2 | HEART RATE: 70 BPM | WEIGHT: 187 LBS | DIASTOLIC BLOOD PRESSURE: 62 MMHG | HEIGHT: 66 IN | SYSTOLIC BLOOD PRESSURE: 98 MMHG

## 2023-10-04 DIAGNOSIS — F70 MILD INTELLECTUAL DISABILITY: ICD-10-CM

## 2023-10-04 DIAGNOSIS — F32.A DEPRESSION, UNSPECIFIED DEPRESSION TYPE: ICD-10-CM

## 2023-10-04 DIAGNOSIS — Z23 ENCOUNTER FOR IMMUNIZATION: ICD-10-CM

## 2023-10-04 DIAGNOSIS — E87.1 HYPONATREMIA: ICD-10-CM

## 2023-10-04 DIAGNOSIS — E03.8 OTHER SPECIFIED HYPOTHYROIDISM: ICD-10-CM

## 2023-10-04 DIAGNOSIS — E53.8 B12 DEFICIENCY: ICD-10-CM

## 2023-10-04 DIAGNOSIS — F79 INTELLECTUAL DISABILITY: ICD-10-CM

## 2023-10-04 DIAGNOSIS — Z00.00 MEDICARE ANNUAL WELLNESS VISIT, SUBSEQUENT: Primary | ICD-10-CM

## 2023-10-04 DIAGNOSIS — E66.09 CLASS 1 OBESITY DUE TO EXCESS CALORIES WITHOUT SERIOUS COMORBIDITY WITH BODY MASS INDEX (BMI) OF 30.0 TO 30.9 IN ADULT: ICD-10-CM

## 2023-10-04 DIAGNOSIS — G40.909 SEIZURE DISORDER: ICD-10-CM

## 2023-10-04 DIAGNOSIS — Z00.00 MEDICARE ANNUAL WELLNESS VISIT, SUBSEQUENT: ICD-10-CM

## 2023-10-04 LAB
ALBUMIN SERPL-MCNC: 4.4 G/DL (ref 3.5–5.2)
ALBUMIN/GLOB SERPL: 1.8 G/DL
ALP SERPL-CCNC: 56 U/L (ref 39–117)
ALT SERPL W P-5'-P-CCNC: 15 U/L (ref 1–41)
ANION GAP SERPL CALCULATED.3IONS-SCNC: 8 MMOL/L (ref 5–15)
AST SERPL-CCNC: 18 U/L (ref 1–40)
BILIRUB SERPL-MCNC: 0.5 MG/DL (ref 0–1.2)
BUN SERPL-MCNC: 15 MG/DL (ref 6–20)
BUN/CREAT SERPL: 19.2 (ref 7–25)
CALCIUM SPEC-SCNC: 9.4 MG/DL (ref 8.6–10.5)
CHLORIDE SERPL-SCNC: 99 MMOL/L (ref 98–107)
CHOLEST SERPL-MCNC: 121 MG/DL (ref 0–200)
CO2 SERPL-SCNC: 29 MMOL/L (ref 22–29)
CREAT SERPL-MCNC: 0.78 MG/DL (ref 0.76–1.27)
DEPRECATED RDW RBC AUTO: 42.2 FL (ref 37–54)
EGFRCR SERPLBLD CKD-EPI 2021: 114.2 ML/MIN/1.73
ERYTHROCYTE [DISTWIDTH] IN BLOOD BY AUTOMATED COUNT: 12.8 % (ref 12.3–15.4)
FOLATE SERPL-MCNC: 17.6 NG/ML (ref 4.78–24.2)
GLOBULIN UR ELPH-MCNC: 2.4 GM/DL
GLUCOSE SERPL-MCNC: 84 MG/DL (ref 65–99)
HCT VFR BLD AUTO: 44.3 % (ref 37.5–51)
HDLC SERPL-MCNC: 41 MG/DL (ref 40–60)
HGB BLD-MCNC: 15 G/DL (ref 13–17.7)
LDLC SERPL CALC-MCNC: 65 MG/DL (ref 0–100)
LDLC/HDLC SERPL: 1.58 {RATIO}
LYMPHOCYTES # BLD MANUAL: 3.34 10*3/MM3 (ref 0.7–3.1)
LYMPHOCYTES NFR BLD MANUAL: 4.1 % (ref 5–12)
MCH RBC QN AUTO: 30.6 PG (ref 26.6–33)
MCHC RBC AUTO-ENTMCNC: 33.9 G/DL (ref 31.5–35.7)
MCV RBC AUTO: 90.4 FL (ref 79–97)
MONOCYTES # BLD: 0.22 10*3/MM3 (ref 0.1–0.9)
NEUTROPHILS # BLD AUTO: 1.89 10*3/MM3 (ref 1.7–7)
NEUTROPHILS NFR BLD MANUAL: 34.7 % (ref 42.7–76)
NRBC BLD AUTO-RTO: 0 /100 WBC (ref 0–0.2)
PLAT MORPH BLD: NORMAL
PLATELET # BLD AUTO: 147 10*3/MM3 (ref 140–450)
PMV BLD AUTO: 12.1 FL (ref 6–12)
POTASSIUM SERPL-SCNC: 4.6 MMOL/L (ref 3.5–5.2)
PROT SERPL-MCNC: 6.8 G/DL (ref 6–8.5)
RBC # BLD AUTO: 4.9 10*6/MM3 (ref 4.14–5.8)
RBC MORPH BLD: NORMAL
SODIUM SERPL-SCNC: 136 MMOL/L (ref 136–145)
TRIGL SERPL-MCNC: 76 MG/DL (ref 0–150)
TSH SERPL DL<=0.05 MIU/L-ACNC: 2.57 UIU/ML (ref 0.27–4.2)
VARIANT LYMPHS NFR BLD MANUAL: 61.2 % (ref 19.6–45.3)
VIT B12 BLD-MCNC: 607 PG/ML (ref 211–946)
VLDLC SERPL-MCNC: 15 MG/DL (ref 5–40)
WBC MORPH BLD: NORMAL
WBC NRBC COR # BLD: 5.45 10*3/MM3 (ref 3.4–10.8)

## 2023-10-04 PROCEDURE — 80061 LIPID PANEL: CPT

## 2023-10-04 PROCEDURE — 82607 VITAMIN B-12: CPT

## 2023-10-04 PROCEDURE — 84443 ASSAY THYROID STIM HORMONE: CPT

## 2023-10-04 PROCEDURE — 82746 ASSAY OF FOLIC ACID SERUM: CPT

## 2023-10-04 PROCEDURE — 36415 COLL VENOUS BLD VENIPUNCTURE: CPT

## 2023-10-04 PROCEDURE — 80053 COMPREHEN METABOLIC PANEL: CPT

## 2023-10-04 PROCEDURE — 85007 BL SMEAR W/DIFF WBC COUNT: CPT

## 2023-10-04 PROCEDURE — 85025 COMPLETE CBC W/AUTO DIFF WBC: CPT

## 2023-10-04 RX ORDER — ACETAMINOPHEN 500 MG
500 TABLET ORAL EVERY 6 HOURS PRN
COMMUNITY

## 2023-10-04 RX ORDER — BISMUTH SUBSALICYLATE 262 MG/1
524 TABLET, CHEWABLE ORAL
COMMUNITY

## 2023-10-04 RX ORDER — NEOMYCIN SULFATE, POLYMYXIN B SULFATE AND GRAMICIDIN 1.75; 10000; .025 MG/ML; [USP'U]/ML; MG/ML
SOLUTION/ DROPS OPHTHALMIC
COMMUNITY
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
## 2023-10-09 NOTE — TELEPHONE ENCOUNTER
RASHARD CALLING TO ADVISE PATIENT MISSED ANOTHER DOSE AND ONLY TOOK ONE OF TWO MEDICATIONS THIS MORNING.    HE DID NOT HAVE ANY SEIZURE.    PLEASE ADVISE

## 2023-10-10 ENCOUNTER — DOCUMENTATION (OUTPATIENT)
Dept: INTERNAL MEDICINE | Facility: CLINIC | Age: 43
End: 2023-10-10
Payer: MEDICARE

## 2023-10-10 ENCOUNTER — TELEPHONE (OUTPATIENT)
Dept: INTERNAL MEDICINE | Facility: CLINIC | Age: 43
End: 2023-10-10
Payer: MEDICARE

## 2023-10-17 ENCOUNTER — OFFICE VISIT (OUTPATIENT)
Dept: INTERNAL MEDICINE | Facility: CLINIC | Age: 43
End: 2023-10-17
Payer: MEDICARE

## 2023-10-17 VITALS
WEIGHT: 188.6 LBS | BODY MASS INDEX: 30.31 KG/M2 | HEIGHT: 66 IN | HEART RATE: 60 BPM | TEMPERATURE: 98.4 F | DIASTOLIC BLOOD PRESSURE: 55 MMHG | SYSTOLIC BLOOD PRESSURE: 95 MMHG | OXYGEN SATURATION: 95 %

## 2023-10-17 DIAGNOSIS — R05.9 COUGH, UNSPECIFIED TYPE: ICD-10-CM

## 2023-10-17 DIAGNOSIS — U07.1 COVID-19: Primary | ICD-10-CM

## 2023-10-17 LAB
EXPIRATION DATE: ABNORMAL
EXPIRATION DATE: NORMAL
FLUAV AG UPPER RESP QL IA.RAPID: NOT DETECTED
FLUBV AG UPPER RESP QL IA.RAPID: NOT DETECTED
INTERNAL CONTROL: ABNORMAL
INTERNAL CONTROL: NORMAL
Lab: ABNORMAL
Lab: NORMAL
S PYO AG THROAT QL: NEGATIVE
SARS-COV-2 AG UPPER RESP QL IA.RAPID: DETECTED

## 2023-10-17 PROCEDURE — 87081 CULTURE SCREEN ONLY: CPT | Performed by: NURSE PRACTITIONER

## 2023-10-17 PROCEDURE — 1159F MED LIST DOCD IN RCRD: CPT | Performed by: NURSE PRACTITIONER

## 2023-10-17 PROCEDURE — 87428 SARSCOV & INF VIR A&B AG IA: CPT | Performed by: NURSE PRACTITIONER

## 2023-10-17 PROCEDURE — 99214 OFFICE O/P EST MOD 30 MIN: CPT | Performed by: NURSE PRACTITIONER

## 2023-10-17 PROCEDURE — 1160F RVW MEDS BY RX/DR IN RCRD: CPT | Performed by: NURSE PRACTITIONER

## 2023-10-17 PROCEDURE — 87880 STREP A ASSAY W/OPTIC: CPT | Performed by: NURSE PRACTITIONER

## 2023-10-17 RX ORDER — FLUTICASONE PROPIONATE 50 MCG
2 SPRAY, SUSPENSION (ML) NASAL DAILY
Qty: 16 G | Refills: 0 | Status: SHIPPED | OUTPATIENT
Start: 2023-10-17

## 2023-10-17 RX ORDER — BROMPHENIRAMINE MALEATE, PSEUDOEPHEDRINE HYDROCHLORIDE, AND DEXTROMETHORPHAN HYDROBROMIDE 2; 30; 10 MG/5ML; MG/5ML; MG/5ML
10 SYRUP ORAL 4 TIMES DAILY PRN
Qty: 473 ML | Refills: 0 | Status: SHIPPED | OUTPATIENT
Start: 2023-10-17

## 2023-10-17 NOTE — LETTER
October 17, 2023     Patient: Jaleel Garcia   YOB: 1980   Date of Visit: 10/17/2023       To Whom It May Concern:    It is my medical opinion that Jaleel Garcia  should quarantine for 5 days, and wear a mask for 5 days .           Sincerely,        Catherine Culp, FER

## 2023-10-17 NOTE — PROGRESS NOTES
Chief Complaint  Cough    Subjective          Jaleel Garcia presents to [facility]  History of Present Illness  Historian: patient and caregiver.   Eating and drinking well.   Cough  This is a new problem. Episode onset: 4 days. The problem occurs constantly. The cough is Non-productive. Associated symptoms include nasal congestion and rhinorrhea. Pertinent negatives include no chest pain, chills, ear congestion, ear pain, fever, headaches, heartburn, hemoptysis, myalgias, postnasal drip, rash, sore throat, shortness of breath, sweats, weight loss or wheezing. He has tried rest for the symptoms. The treatment provided mild relief.   Allergies  Associated symptoms include coughing. Pertinent negatives include no chest pain, chills, fever, headaches, myalgias, rash or sore throat.         Current Outpatient Medications   Medication Instructions    acetaminophen (TYLENOL) 500 mg, Every 6 Hours PRN    bismuth subsalicylate (PEPTO BISMOL) 524 mg, 4 Times Daily Before Meals & Nightly    brompheniramine-pseudoephedrine-DM 30-2-10 MG/5ML syrup 10 mL, Oral, 4 Times Daily PRN    Calcium Carb-Cholecalciferol (Oyster Shell Calcium w/D) 500-5 MG-MCG tablet 1 tablet, Oral, Daily    cetirizine (ZYRTEC) 10 mg, Oral, Daily    desmopressin (DDAVP) 400 mcg, Oral, Nightly    divalproex (DEPAKOTE ER) 1,000 mg, Oral, 2 Times Daily    escitalopram (LEXAPRO) 10 mg, Oral, Daily, To start after titrates off sertraline.    fluticasone (FLONASE) 50 MCG/ACT nasal spray 2 sprays, Nasal, Daily    levETIRAcetam (KEPPRA) 750 MG tablet Take 2 tabs BID    levothyroxine (SYNTHROID, LEVOTHROID) 50 mcg, Oral, Every Early Morning    neomycin-polymyxin-gramicidin (NEOSPORIN) 1.75-14883-.025 ophthalmic solution No dose, route, or frequency recorded.       The following portions of the patient's history were reviewed and updated as appropriate: allergies, current medications, past family history, past  "medical history, past social history, past surgical history, and problem list.    Objective   Vital Signs:   BP 95/55 (BP Location: Right arm, Patient Position: Sitting, Cuff Size: Adult)   Pulse 60   Temp 98.4 °F (36.9 °C) (Temporal)   Ht 167.6 cm (65.98\")   Wt 85.5 kg (188 lb 9.6 oz)   SpO2 95%   BMI 30.46 kg/m²     BP Readings from Last 3 Encounters:   10/17/23 95/55   10/04/23 98/62   09/26/23 104/58   Repeat BP today: 108/64    Wt Readings from Last 3 Encounters:   10/17/23 85.5 kg (188 lb 9.6 oz)   10/04/23 84.8 kg (187 lb)   09/26/23 86.2 kg (190 lb)           Physical Exam  HENT:      Nose: Congestion present.   Pulmonary:      Effort: Pulmonary effort is normal. No respiratory distress.      Breath sounds: No stridor. No wheezing, rhonchi or rales.   Chest:      Chest wall: No tenderness.          Appearance: No acute distress, well-nourished  Head: normocephalic, atraumatic  Eyes: extraocular movements intact, no scleral icterus, no conjunctival injection  Ears, Nose, and Throat: external ears normal, nares patent, moist mucous membranes  Cardiovascular: regular rate and rhythm. no murmurs, rubs, or gallops. no edema  Respiratory: breathing comfortably, symmetric chest rise, clear to auscultation bilaterally. No wheezes, rales, or rhonchi.  Neuro: alert and oriented to time, place, and person. Normal gait  Psych: normal mood and affect     Result Review :   The following data was reviewed by: FER Packer on 10/17/2023:  Common labs          3/1/2023    09:45 5/17/2023    14:25 10/4/2023    14:45   Common Labs   Glucose 78  79  84    BUN 14  13  15    Creatinine 0.62  0.71  0.78    Sodium 134  132  136    Potassium 5.2  4.7  4.6    Chloride 97  96  99    Calcium 9.4  9.2  9.4    Albumin 4.3  4.3  4.4    Total Bilirubin 0.8  0.6  0.5    Alkaline Phosphatase 49  55  56    AST (SGOT) 17  18  18    ALT (SGPT) 16  15  15    WBC 5.91   5.45    Hemoglobin 15.3   15.0    Hematocrit 45.8   44.3  "   Platelets 145   147    Total Cholesterol   121    Triglycerides   76    HDL Cholesterol   41    LDL Cholesterol    65             Lab Results   Component Value Date    SARSANTIGEN Detected (A) 10/17/2023    COVID19 NOT DETECTED 06/04/2020    FLUAAG Not Detected 10/17/2023    FLUBAG Not Detected 10/17/2023    RAPSCRN Negative 10/17/2023    INR 1.10 (L) 12/26/2021    BILIRUBINUR Negative 10/01/2022       Procedures        Assessment and Plan    Diagnoses and all orders for this visit:    1. COVID-19 (Primary)  -     POCT SARS-CoV-2 + Flu Antigen KAILEY  -     POCT rapid strep A  -     brompheniramine-pseudoephedrine-DM 30-2-10 MG/5ML syrup; Take 10 mL by mouth 4 (Four) Times a Day As Needed for Cough or Congestion.  Dispense: 473 mL; Refill: 0  -     fluticasone (FLONASE) 50 MCG/ACT nasal spray; 2 sprays into the nostril(s) as directed by provider Daily.  Dispense: 16 g; Refill: 0    2. Cough, unspecified type  -     POCT SARS-CoV-2 + Flu Antigen KAILEY  -     POCT rapid strep A  -     Beta Strep Culture, Throat - Swab, Throat  -     brompheniramine-pseudoephedrine-DM 30-2-10 MG/5ML syrup; Take 10 mL by mouth 4 (Four) Times a Day As Needed for Cough or Congestion.  Dispense: 473 mL; Refill: 0  -     fluticasone (FLONASE) 50 MCG/ACT nasal spray; 2 sprays into the nostril(s) as directed by provider Daily.  Dispense: 16 g; Refill: 0      - instructed patient on quarantine.   - declined paxlovid.   - increase fluid intake   - tylenol/motrin PRN for pain or fever (motrin only > 6 months)  - diet as tolerated   - cool mist humidifier in the room   - vicks to the chest       There are no discontinued medications.       Follow Up   Return if symptoms worsen or fail to improve.  Patient was given instructions and counseling regarding his condition or for health maintenance advice. Please see specific information pulled into the AVS if appropriate.       Catherine Culp, FER  10/18/23  09:53 EDT

## 2023-10-18 ENCOUNTER — TELEPHONE (OUTPATIENT)
Dept: INTERNAL MEDICINE | Facility: CLINIC | Age: 43
End: 2023-10-18
Payer: MEDICARE

## 2023-10-18 NOTE — TELEPHONE ENCOUNTER
Caller: MELINDA MACK[employer]    Relationship: Caregiver (non-relative)    Best call back number: [phone]     What form or medical record are you requesting: AFTER VISIT SUMMARY    Who is requesting this form or medical record from you: MELINDA    How would you like to receive the form or medical records (pick-up, mail, fax): FAX  If fax, what is the fax number: [phone] ATTN: LARISSA OR MELINDA    Timeframe paperwork needed: ASAP    Additional notes:

## 2023-10-19 LAB — BACTERIA SPEC AEROBE CULT: NORMAL

## 2023-10-31 DIAGNOSIS — U07.1 COVID-19: ICD-10-CM

## 2023-10-31 DIAGNOSIS — R05.9 COUGH, UNSPECIFIED TYPE: ICD-10-CM

## 2023-10-31 RX ORDER — FLUTICASONE PROPIONATE 50 MCG
2 SPRAY, SUSPENSION (ML) NASAL DAILY
Qty: 16 G | Refills: 0 | Status: SHIPPED | OUTPATIENT
Start: 2023-10-31 | End: 2023-11-01 | Stop reason: SDUPTHER

## 2023-11-01 ENCOUNTER — OFFICE VISIT (OUTPATIENT)
Dept: INTERNAL MEDICINE | Facility: CLINIC | Age: 43
End: 2023-11-01
Payer: MEDICARE

## 2023-11-01 VITALS
DIASTOLIC BLOOD PRESSURE: 63 MMHG | HEIGHT: 66 IN | HEART RATE: 65 BPM | BODY MASS INDEX: 29.57 KG/M2 | SYSTOLIC BLOOD PRESSURE: 99 MMHG | WEIGHT: 184 LBS | OXYGEN SATURATION: 98 % | TEMPERATURE: 97 F

## 2023-11-01 DIAGNOSIS — H92.01 RIGHT EAR PAIN: Primary | ICD-10-CM

## 2023-11-01 DIAGNOSIS — J30.9 ALLERGIC RHINITIS, UNSPECIFIED SEASONALITY, UNSPECIFIED TRIGGER: ICD-10-CM

## 2023-11-01 RX ORDER — FLUTICASONE PROPIONATE 50 MCG
2 SPRAY, SUSPENSION (ML) NASAL DAILY PRN
Qty: 16 G | Refills: 3 | Status: SHIPPED | OUTPATIENT
Start: 2023-11-01

## 2023-11-01 RX ORDER — CIPROFLOXACIN AND DEXAMETHASONE 3; 1 MG/ML; MG/ML
4 SUSPENSION/ DROPS AURICULAR (OTIC) 2 TIMES DAILY
Qty: 7.5 ML | Refills: 0 | Status: SHIPPED | OUTPATIENT
Start: 2023-11-01 | End: 2023-11-08

## 2023-11-01 NOTE — PROGRESS NOTES
"Chief Complaint  Earache (Right ear pain )    Subjective          Jaleel Garcia presents to [facility]  History of Present Illness    Historian: Dari from [facility]    Right ear pain since last week    Current Outpatient Medications   Medication Instructions    acetaminophen (TYLENOL) 500 mg, Oral, Every 6 Hours PRN    bismuth subsalicylate (PEPTO BISMOL) 524 mg, Oral, 4 Times Daily Before Meals & Nightly    brompheniramine-pseudoephedrine-DM 30-2-10 MG/5ML syrup 10 mL, Oral, 4 Times Daily PRN    Calcium Carb-Cholecalciferol (Oyster Shell Calcium w/D) 500-5 MG-MCG tablet 1 tablet, Oral, Daily    cetirizine (ZYRTEC) 10 mg, Oral, Daily    ciprofloxacin-dexAMETHasone (Ciprodex) 0.3-0.1 % otic suspension 4 drops, Right Ear, 2 Times Daily    desmopressin (DDAVP) 400 mcg, Oral, Nightly    divalproex (DEPAKOTE ER) 1,000 mg, Oral, 2 Times Daily    escitalopram (LEXAPRO) 10 mg, Oral, Daily, To start after titrates off sertraline.    fluticasone (FLONASE) 50 MCG/ACT nasal spray 2 sprays, Nasal, Daily PRN    levETIRAcetam (KEPPRA) 750 MG tablet Take 2 tabs BID    levothyroxine (SYNTHROID, LEVOTHROID) 50 mcg, Oral, Every Early Morning    neomycin-polymyxin-gramicidin (NEOSPORIN) 1.75-50424-.025 ophthalmic solution No dose, route, or frequency recorded.       The following portions of the patient's history were reviewed and updated as appropriate: allergies, current medications, past family history, past medical history, past social history, past surgical history, and problem list.    Objective   Vital Signs:   BP 99/63 (BP Location: Right arm, Patient Position: Sitting, Cuff Size: Adult)   Pulse 65   Temp 97 °F (36.1 °C) (Temporal)   Ht 167.6 cm (66\")   Wt 83.5 kg (184 lb)   SpO2 98%   BMI 29.70 kg/m²     BP Readings from Last 3 Encounters:   11/01/23 99/63   10/17/23 95/55   10/04/23 98/62     Wt Readings from Last 3 Encounters:   11/01/23 83.5 kg (184 lb)   10/17/23 " 85.5 kg (188 lb 9.6 oz)   10/04/23 84.8 kg (187 lb)           Physical Exam  Vitals reviewed.   Constitutional:       General: He is not in acute distress.     Appearance: Normal appearance. He is not ill-appearing, toxic-appearing or diaphoretic.   HENT:      Head: Normocephalic and atraumatic.      Right Ear: Tympanic membrane, ear canal and external ear normal.      Left Ear: Tympanic membrane, ear canal and external ear normal.   Eyes:      Conjunctiva/sclera: Conjunctivae normal.   Cardiovascular:      Rate and Rhythm: Normal rate and regular rhythm.      Pulses: Normal pulses.      Heart sounds: Normal heart sounds. No murmur heard.     No friction rub. No gallop.   Pulmonary:      Effort: Pulmonary effort is normal. No respiratory distress.      Breath sounds: Normal breath sounds. No stridor. No wheezing, rhonchi or rales.   Chest:      Chest wall: No tenderness.   Abdominal:      General: Abdomen is flat.      Palpations: Abdomen is soft. There is no mass.      Tenderness: There is no abdominal tenderness.   Musculoskeletal:      Right lower leg: No edema.      Left lower leg: No edema.   Skin:     General: Skin is warm and dry.   Neurological:      General: No focal deficit present.      Mental Status: He is alert. Mental status is at baseline.   Psychiatric:         Mood and Affect: Mood normal.         Behavior: Behavior normal.         Thought Content: Thought content normal.         Judgment: Judgment normal.        Result Review :   The following data was reviewed by: Rico Monzon MD on 11/01/2023:  Common labs          3/1/2023    09:45 5/17/2023    14:25 10/4/2023    14:45   Common Labs   Glucose 78  79  84    BUN 14  13  15    Creatinine 0.62  0.71  0.78    Sodium 134  132  136    Potassium 5.2  4.7  4.6    Chloride 97  96  99    Calcium 9.4  9.2  9.4    Albumin 4.3  4.3  4.4    Total Bilirubin 0.8  0.6  0.5    Alkaline Phosphatase 49  55  56    AST (SGOT) 17  18  18    ALT (SGPT) 16  15  15     WBC 5.91   5.45    Hemoglobin 15.3   15.0    Hematocrit 45.8   44.3    Platelets 145   147    Total Cholesterol   121    Triglycerides   76    HDL Cholesterol   41    LDL Cholesterol    65             Lab Results   Component Value Date    SARSANTIGEN Detected (A) 10/17/2023    COVID19 NOT DETECTED 06/04/2020    FLUAAG Not Detected 10/17/2023    FLUBAG Not Detected 10/17/2023    RAPSCRN Negative 10/17/2023    INR 1.10 (L) 12/26/2021    BILIRUBINUR Negative 10/01/2022       Procedures        Assessment and Plan    Diagnoses and all orders for this visit:    1. Right ear pain (Primary)  -     ciprofloxacin-dexAMETHasone (Ciprodex) 0.3-0.1 % otic suspension; Administer 4 drops to the right ear 2 (Two) Times a Day for 7 days.  Dispense: 7.5 mL; Refill: 0    2. Allergic rhinitis, unspecified seasonality, unspecified trigger  -     fluticasone (FLONASE) 50 MCG/ACT nasal spray; 2 sprays into the nostril(s) as directed by provider Daily As Needed for Rhinitis or Allergies.  Dispense: 16 g; Refill: 3          Medications Discontinued During This Encounter   Medication Reason    fluticasone (FLONASE) 50 MCG/ACT nasal spray Reorder          Follow Up   Return if symptoms worsen or fail to improve.  Patient was given instructions and counseling regarding his condition or for health maintenance advice. Please see specific information pulled into the AVS if appropriate.       Rico Monzon MD  11/01/23  09:13 EDT

## 2023-11-02 ENCOUNTER — TELEPHONE (OUTPATIENT)
Dept: INTERNAL MEDICINE | Facility: CLINIC | Age: 43
End: 2023-11-02
Payer: MEDICARE

## 2023-11-02 NOTE — TELEPHONE ENCOUNTER
Caller: NESTOR CRUZ    Relationship: Mother    Best call back number: [phone]    What was the call regarding: PATIENT'S MOTHER/GUARDIAN  IS REQUESTING CALL FROM CLINICAL STAFF. DID NOT DISCUSS SPECIFICS.

## 2023-11-03 NOTE — TELEPHONE ENCOUNTER
Unfortunately, there is no medical evidence of benefit with genetic testing for psychiatric medicines, and quite honestly I think Jaleel is doing quite well.    I'm happy to place the order if they'd like.  If it's as expensive as they say, they really should contact medicare first to make sure it's covered.

## 2023-11-03 NOTE — TELEPHONE ENCOUNTER
Patients guardian stated they are needing genetic testing done to make sure he is on the right medication, patients guardian stated that when he was in for his appointment with you that this was discussed. Would you like me to schedule appointment? Patients guardian also stated they are trying to contact medicare to see if they can pay for it as she was told its $400

## 2023-11-27 ENCOUNTER — OFFICE VISIT (OUTPATIENT)
Dept: INTERNAL MEDICINE | Facility: CLINIC | Age: 43
End: 2023-11-27
Payer: MEDICARE

## 2023-11-27 VITALS
HEART RATE: 70 BPM | WEIGHT: 177.8 LBS | OXYGEN SATURATION: 97 % | HEIGHT: 66 IN | SYSTOLIC BLOOD PRESSURE: 105 MMHG | DIASTOLIC BLOOD PRESSURE: 61 MMHG | BODY MASS INDEX: 28.57 KG/M2 | TEMPERATURE: 98.4 F

## 2023-11-27 DIAGNOSIS — F70 MILD INTELLECTUAL DISABILITY: ICD-10-CM

## 2023-11-27 DIAGNOSIS — Q67.3 PLAGIOCEPHALY: Primary | ICD-10-CM

## 2023-11-27 DIAGNOSIS — F69 BEHAVIOR PROBLEM, ADULT: ICD-10-CM

## 2023-11-27 PROCEDURE — 99213 OFFICE O/P EST LOW 20 MIN: CPT | Performed by: STUDENT IN AN ORGANIZED HEALTH CARE EDUCATION/TRAINING PROGRAM

## 2023-11-27 RX ORDER — ARIPIPRAZOLE 2 MG/1
2 TABLET ORAL DAILY
Qty: 30 TABLET | Refills: 2 | Status: SHIPPED | OUTPATIENT
Start: 2023-11-27
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
## 2023-12-13 ENCOUNTER — OFFICE VISIT (OUTPATIENT)
Dept: INTERNAL MEDICINE | Facility: CLINIC | Age: 43
End: 2023-12-13
Payer: MEDICARE

## 2023-12-13 VITALS
TEMPERATURE: 97.3 F | HEART RATE: 70 BPM | DIASTOLIC BLOOD PRESSURE: 64 MMHG | SYSTOLIC BLOOD PRESSURE: 97 MMHG | WEIGHT: 177.4 LBS | HEIGHT: 66 IN | OXYGEN SATURATION: 97 % | BODY MASS INDEX: 28.51 KG/M2

## 2023-12-13 DIAGNOSIS — E66.3 OVERWEIGHT (BMI 25.0-29.9): Primary | ICD-10-CM

## 2023-12-13 DIAGNOSIS — Z71.3 ENCOUNTER FOR NUTRITIONAL COUNSELING: ICD-10-CM

## 2023-12-13 DIAGNOSIS — F70 MILD INTELLECTUAL DISABILITY: ICD-10-CM

## 2023-12-13 DIAGNOSIS — F69 BEHAVIOR PROBLEM, ADULT: ICD-10-CM

## 2023-12-13 RX ORDER — ARIPIPRAZOLE 2 MG/1
2 TABLET ORAL DAILY
Qty: 90 TABLET | Refills: 2 | Status: SHIPPED | OUTPATIENT
Start: 2023-12-13

## 2023-12-13 NOTE — PROGRESS NOTES
Chief Complaint  Prescription Concern (Pt would like to speak about calorie intake prescription.)    Subjective          Jaleel Garcia presents to [facility]  History of Present Illness    Historian: Dari from [facility]    Per Dari's report, mother feels that behavior improved.  Dari feels that Rom still gets frustrated in situations where asked numerous questions.    No interim seizures.    Seen by nutrition in the interim, who recommends discontinuing calorie restriction.  She is asking for a written prescription to this effect.    Current Outpatient Medications   Medication Instructions    acetaminophen (TYLENOL) 500 mg, Oral, Every 6 Hours PRN    ARIPiprazole (ABILIFY) 2 mg, Oral, Daily    bismuth subsalicylate (PEPTO BISMOL) 524 mg, Oral, 4 Times Daily Before Meals & Nightly    brompheniramine-pseudoephedrine-DM 30-2-10 MG/5ML syrup 10 mL, Oral, 4 Times Daily PRN    Calcium Carb-Cholecalciferol (Oyster Shell Calcium w/D) 500-5 MG-MCG tablet 1 tablet, Oral, Daily    cetirizine (ZYRTEC) 10 mg, Oral, Daily    desmopressin (DDAVP) 400 mcg, Oral, Nightly    divalproex (DEPAKOTE ER) 1,000 mg, Oral, 2 Times Daily    escitalopram (LEXAPRO) 10 mg, Oral, Daily, To start after titrates off sertraline.    fluticasone (FLONASE) 50 MCG/ACT nasal spray 2 sprays, Nasal, Daily PRN    levETIRAcetam (KEPPRA) 750 MG tablet Take 2 tabs BID    levothyroxine (SYNTHROID, LEVOTHROID) 50 mcg, Oral, Every Early Morning    neomycin-polymyxin-gramicidin (NEOSPORIN) 1.75-11812-.025 ophthalmic solution No dose, route, or frequency recorded.       The following portions of the patient's history were reviewed and updated as appropriate: allergies, current medications, past family history, past medical history, past social history, past surgical history, and problem list.    Objective   Vital Signs:   BP 97/64 (BP Location: Left arm, Patient Position: Sitting, Cuff Size: Adult)   Pulse 70    "Temp 97.3 °F (36.3 °C) (Temporal)   Ht 167.6 cm (66\")   Wt 80.5 kg (177 lb 6.4 oz)   SpO2 97%   BMI 28.63 kg/m²     BP Readings from Last 3 Encounters:   12/13/23 97/64   11/27/23 105/61   11/01/23 99/63     Wt Readings from Last 3 Encounters:   12/13/23 80.5 kg (177 lb 6.4 oz)   11/27/23 80.6 kg (177 lb 12.8 oz)   11/01/23 83.5 kg (184 lb)           Physical Exam  Vitals reviewed.   Constitutional:       General: He is not in acute distress.     Appearance: Normal appearance. He is not ill-appearing, toxic-appearing or diaphoretic.   HENT:      Head: Normocephalic and atraumatic.      Right Ear: External ear normal.      Left Ear: External ear normal.   Eyes:      Conjunctiva/sclera: Conjunctivae normal.   Cardiovascular:      Rate and Rhythm: Normal rate and regular rhythm.      Pulses: Normal pulses.      Heart sounds: Normal heart sounds. No murmur heard.     No friction rub. No gallop.   Pulmonary:      Effort: Pulmonary effort is normal. No respiratory distress.      Breath sounds: Normal breath sounds. No stridor. No wheezing, rhonchi or rales.   Chest:      Chest wall: No tenderness.   Abdominal:      General: Abdomen is flat.      Palpations: Abdomen is soft. There is no mass.      Tenderness: There is no abdominal tenderness.   Musculoskeletal:      Right lower leg: No edema.      Left lower leg: No edema.   Skin:     General: Skin is warm and dry.   Neurological:      Mental Status: He is alert. Mental status is at baseline.   Psychiatric:         Behavior: Behavior normal.         Thought Content: Thought content normal.         Judgment: Judgment normal.          Result Review :   The following data was reviewed by: Rico Monzon MD on 12/13/2023:  Common labs          3/1/2023    09:45 5/17/2023    14:25 10/4/2023    14:45   Common Labs   Glucose 78  79  84    BUN 14  13  15    Creatinine 0.62  0.71  0.78    Sodium 134  132  136    Potassium 5.2  4.7  4.6    Chloride 97  96  99    Calcium 9.4  " 9.2  9.4    Albumin 4.3  4.3  4.4    Total Bilirubin 0.8  0.6  0.5    Alkaline Phosphatase 49  55  56    AST (SGOT) 17  18  18    ALT (SGPT) 16  15  15    WBC 5.91   5.45    Hemoglobin 15.3   15.0    Hematocrit 45.8   44.3    Platelets 145   147    Total Cholesterol   121    Triglycerides   76    HDL Cholesterol   41    LDL Cholesterol    65             Lab Results   Component Value Date    SARSANTIGEN Detected (A) 10/17/2023    COVID19 NOT DETECTED 06/04/2020    FLUAAG Not Detected 10/17/2023    FLUBAG Not Detected 10/17/2023    RAPSCRN Negative 10/17/2023    INR 1.10 (L) 12/26/2021    BILIRUBINUR Negative 10/01/2022       Procedures        Assessment and Plan    Diagnoses and all orders for this visit:    1. Overweight (BMI 25.0-29.9) (Primary)    2. Encounter for nutritional counseling    3. Mild intellectual disability    4. Behavior problem, adult  -     ARIPiprazole (Abilify) 2 MG tablet; Take 1 tablet by mouth Daily.  Dispense: 90 tablet; Refill: 2      Behavior problem:  -improved on abilify, will continue at current doseage    Overweight:  -gave [employer] representative a written prescription to remove calorie restriction    Medications Discontinued During This Encounter   Medication Reason    ARIPiprazole (Abilify) 2 MG tablet Reorder          Follow Up   Return if symptoms worsen or fail to improve.  Patient was given instructions and counseling regarding his condition or for health maintenance advice. Please see specific information pulled into the AVS if appropriate.       Rico Monzon MD  12/13/23  18:22 EST

## 2023-12-21 NOTE — TELEPHONE ENCOUNTER
Caller: MELINDA LEUNG    Relationship: Caregiver (non-relative)    Best call back number: [phone]     What orders are you requesting (i.e. lab or imaging): GENETIC TESTING    In what timeframe would the patient need to come in: ASAP    Where will you receive your lab/imaging services:     Additional notes: WOULD LIKE TO HAVE GENETIC TESTING TO MAKE SURE HIS MEDICATIONS ARE STILL WORKING WITH HIS BODY. HE IS SHOWING A LOT OF AGGRESSION.    CALLER IS WANTING TO KNOW IF THERE IS SOME WAY THIS CAN BE DONE AND PAID FOR BY HIS INSURANCE    PLEASE CALL AND ADVISE  
WAITING ON CALL BACK TO SCHEDULE NURSE VISIT FOR [employer]   
fever cough x 2 wks

## 2024-01-10 ENCOUNTER — OFFICE VISIT (OUTPATIENT)
Dept: INTERNAL MEDICINE | Facility: CLINIC | Age: 44
End: 2024-01-10
Payer: MEDICARE

## 2024-01-10 VITALS
OXYGEN SATURATION: 98 % | DIASTOLIC BLOOD PRESSURE: 69 MMHG | HEIGHT: 66 IN | HEART RATE: 61 BPM | WEIGHT: 190.4 LBS | SYSTOLIC BLOOD PRESSURE: 109 MMHG | TEMPERATURE: 97.5 F | BODY MASS INDEX: 30.6 KG/M2

## 2024-01-10 DIAGNOSIS — F70 MILD INTELLECTUAL DISABILITY: ICD-10-CM

## 2024-01-10 DIAGNOSIS — G40.909 SEIZURE DISORDER: ICD-10-CM

## 2024-01-10 DIAGNOSIS — F69 BEHAVIOR PROBLEM, ADULT: Primary | ICD-10-CM

## 2024-01-10 DIAGNOSIS — E87.1 HYPONATREMIA: ICD-10-CM

## 2024-01-10 DIAGNOSIS — E03.8 OTHER SPECIFIED HYPOTHYROIDISM: ICD-10-CM

## 2024-01-10 DIAGNOSIS — E53.8 B12 DEFICIENCY: ICD-10-CM

## 2024-01-10 LAB
ALBUMIN SERPL-MCNC: 4.1 G/DL (ref 3.5–5.2)
ALBUMIN/GLOB SERPL: 1.6 G/DL
ALP SERPL-CCNC: 47 U/L (ref 39–117)
ALT SERPL W P-5'-P-CCNC: 20 U/L (ref 1–41)
ANION GAP SERPL CALCULATED.3IONS-SCNC: 8.7 MMOL/L (ref 5–15)
AST SERPL-CCNC: 22 U/L (ref 1–40)
BASOPHILS # BLD AUTO: 0.01 10*3/MM3 (ref 0–0.2)
BASOPHILS NFR BLD AUTO: 0.2 % (ref 0–1.5)
BILIRUB SERPL-MCNC: 0.5 MG/DL (ref 0–1.2)
BUN SERPL-MCNC: 15 MG/DL (ref 6–20)
BUN/CREAT SERPL: 21.7 (ref 7–25)
CALCIUM SPEC-SCNC: 9.2 MG/DL (ref 8.6–10.5)
CHLORIDE SERPL-SCNC: 103 MMOL/L (ref 98–107)
CO2 SERPL-SCNC: 28.3 MMOL/L (ref 22–29)
CREAT SERPL-MCNC: 0.69 MG/DL (ref 0.76–1.27)
DEPRECATED RDW RBC AUTO: 42 FL (ref 37–54)
EGFRCR SERPLBLD CKD-EPI 2021: 118.5 ML/MIN/1.73
EOSINOPHIL # BLD AUTO: 0.02 10*3/MM3 (ref 0–0.4)
EOSINOPHIL NFR BLD AUTO: 0.4 % (ref 0.3–6.2)
ERYTHROCYTE [DISTWIDTH] IN BLOOD BY AUTOMATED COUNT: 12.7 % (ref 12.3–15.4)
FOLATE SERPL-MCNC: 12.6 NG/ML (ref 4.78–24.2)
GLOBULIN UR ELPH-MCNC: 2.5 GM/DL
GLUCOSE SERPL-MCNC: 70 MG/DL (ref 65–99)
HCT VFR BLD AUTO: 41.5 % (ref 37.5–51)
HGB BLD-MCNC: 14 G/DL (ref 13–17.7)
IMM GRANULOCYTES # BLD AUTO: 0.01 10*3/MM3 (ref 0–0.05)
IMM GRANULOCYTES NFR BLD AUTO: 0.2 % (ref 0–0.5)
LYMPHOCYTES # BLD AUTO: 2.81 10*3/MM3 (ref 0.7–3.1)
LYMPHOCYTES NFR BLD AUTO: 50.7 % (ref 19.6–45.3)
MCH RBC QN AUTO: 30.7 PG (ref 26.6–33)
MCHC RBC AUTO-ENTMCNC: 33.7 G/DL (ref 31.5–35.7)
MCV RBC AUTO: 91 FL (ref 79–97)
MONOCYTES # BLD AUTO: 0.34 10*3/MM3 (ref 0.1–0.9)
MONOCYTES NFR BLD AUTO: 6.1 % (ref 5–12)
NEUTROPHILS NFR BLD AUTO: 2.35 10*3/MM3 (ref 1.7–7)
NEUTROPHILS NFR BLD AUTO: 42.4 % (ref 42.7–76)
NRBC BLD AUTO-RTO: 0 /100 WBC (ref 0–0.2)
PLATELET # BLD AUTO: 147 10*3/MM3 (ref 140–450)
PMV BLD AUTO: 12.2 FL (ref 6–12)
POTASSIUM SERPL-SCNC: 4.7 MMOL/L (ref 3.5–5.2)
PROT SERPL-MCNC: 6.6 G/DL (ref 6–8.5)
RBC # BLD AUTO: 4.56 10*6/MM3 (ref 4.14–5.8)
SODIUM SERPL-SCNC: 140 MMOL/L (ref 136–145)
TSH SERPL DL<=0.05 MIU/L-ACNC: 2.02 UIU/ML (ref 0.27–4.2)
VIT B12 BLD-MCNC: 599 PG/ML (ref 211–946)
WBC NRBC COR # BLD AUTO: 5.54 10*3/MM3 (ref 3.4–10.8)

## 2024-01-10 PROCEDURE — 99214 OFFICE O/P EST MOD 30 MIN: CPT | Performed by: STUDENT IN AN ORGANIZED HEALTH CARE EDUCATION/TRAINING PROGRAM

## 2024-01-10 PROCEDURE — 80053 COMPREHEN METABOLIC PANEL: CPT | Performed by: STUDENT IN AN ORGANIZED HEALTH CARE EDUCATION/TRAINING PROGRAM

## 2024-01-10 PROCEDURE — 82746 ASSAY OF FOLIC ACID SERUM: CPT | Performed by: STUDENT IN AN ORGANIZED HEALTH CARE EDUCATION/TRAINING PROGRAM

## 2024-01-10 PROCEDURE — 85025 COMPLETE CBC W/AUTO DIFF WBC: CPT | Performed by: STUDENT IN AN ORGANIZED HEALTH CARE EDUCATION/TRAINING PROGRAM

## 2024-01-10 PROCEDURE — 82607 VITAMIN B-12: CPT | Performed by: STUDENT IN AN ORGANIZED HEALTH CARE EDUCATION/TRAINING PROGRAM

## 2024-01-10 PROCEDURE — 84443 ASSAY THYROID STIM HORMONE: CPT | Performed by: STUDENT IN AN ORGANIZED HEALTH CARE EDUCATION/TRAINING PROGRAM

## 2024-01-10 NOTE — PROGRESS NOTES
"Chief Complaint  Hypothyroidism    Subjective          Jaleel Garcia presents to [facility]  History of Present Illness    Here with Dari from [facility] and Jaleel' mother.    Primary historian today is Dari.    Since starting abilify, has gotten more \"ornery.\" Per Dari.  If not getting immediate attention, he will yell at staff.  He is no longer listening when they attempt to redirect.  In addition, he has been staying up late more often recently.    Behavior is much less of a problem in his interactions with his mother, but his mother does report an incident where he had a tantrum when she wouldn't buy something for him.    No interim seizures.    Current Outpatient Medications   Medication Instructions    acetaminophen (TYLENOL) 500 mg, Oral, Every 6 Hours PRN    bismuth subsalicylate (PEPTO BISMOL) 524 mg, Oral, As Needed    brompheniramine-pseudoephedrine-DM 30-2-10 MG/5ML syrup 10 mL, Oral, 4 Times Daily PRN    Calcium Carb-Cholecalciferol (Oyster Shell Calcium w/D) 500-5 MG-MCG tablet 1 tablet, Oral, Daily    cetirizine (ZYRTEC) 10 mg, Oral, Daily    desmopressin (DDAVP) 400 mcg, Oral, Nightly    divalproex (DEPAKOTE ER) 1,000 mg, Oral, 2 Times Daily    escitalopram (LEXAPRO) 10 mg, Oral, Daily, To start after titrates off sertraline.    fluticasone (FLONASE) 50 MCG/ACT nasal spray 2 sprays, Nasal, Daily PRN    levETIRAcetam (KEPPRA) 750 MG tablet Take 2 tabs BID    levothyroxine (SYNTHROID, LEVOTHROID) 50 mcg, Oral, Every Early Morning    neomycin-polymyxin-gramicidin (NEOSPORIN) 1.75-79723-.025 ophthalmic solution No dose, route, or frequency recorded.       The following portions of the patient's history were reviewed and updated as appropriate: allergies, current medications, past family history, past medical history, past social history, past surgical history, and problem list.    Objective   Vital Signs:   /69 (BP Location: Right leg, Patient " "Position: Sitting, Cuff Size: Adult)   Pulse 61   Temp 97.5 °F (36.4 °C) (Temporal)   Ht 167.6 cm (66\")   Wt 86.4 kg (190 lb 6.4 oz)   SpO2 98%   BMI 30.73 kg/m²     BP Readings from Last 3 Encounters:   01/10/24 109/69   12/13/23 97/64   11/27/23 105/61     Wt Readings from Last 3 Encounters:   01/10/24 86.4 kg (190 lb 6.4 oz)   12/13/23 80.5 kg (177 lb 6.4 oz)   11/27/23 80.6 kg (177 lb 12.8 oz)           Physical Exam  Vitals reviewed.   Constitutional:       General: He is not in acute distress.     Appearance: Normal appearance. He is not ill-appearing, toxic-appearing or diaphoretic.   HENT:      Head: Normocephalic and atraumatic.      Right Ear: External ear normal.      Left Ear: External ear normal.   Eyes:      Conjunctiva/sclera: Conjunctivae normal.   Cardiovascular:      Rate and Rhythm: Normal rate and regular rhythm.      Pulses: Normal pulses.      Heart sounds: Normal heart sounds. No murmur heard.     No friction rub. No gallop.   Pulmonary:      Effort: Pulmonary effort is normal. No respiratory distress.      Breath sounds: Normal breath sounds. No stridor. No wheezing, rhonchi or rales.   Chest:      Chest wall: No tenderness.   Abdominal:      General: Abdomen is flat.      Palpations: Abdomen is soft. There is no mass.      Tenderness: There is no abdominal tenderness.   Musculoskeletal:      Right lower leg: No edema.      Left lower leg: No edema.   Skin:     General: Skin is warm and dry.   Neurological:      General: No focal deficit present.      Mental Status: He is alert. Mental status is at baseline.          Result Review :   The following data was reviewed by: Rico Monzon MD on 01/10/2024:  Common labs          3/1/2023    09:45 5/17/2023    14:25 10/4/2023    14:45   Common Labs   Glucose 78  79  84    BUN 14  13  15    Creatinine 0.62  0.71  0.78    Sodium 134  132  136    Potassium 5.2  4.7  4.6    Chloride 97  96  99    Calcium 9.4  9.2  9.4    Albumin 4.3  4.3  4.4  "   Total Bilirubin 0.8  0.6  0.5    Alkaline Phosphatase 49  55  56    AST (SGOT) 17  18  18    ALT (SGPT) 16  15  15    WBC 5.91   5.45    Hemoglobin 15.3   15.0    Hematocrit 45.8   44.3    Platelets 145   147    Total Cholesterol   121    Triglycerides   76    HDL Cholesterol   41    LDL Cholesterol    65             Lab Results   Component Value Date    SARSANTIGEN Detected (A) 10/17/2023    COVID19 NOT DETECTED 06/04/2020    FLUAAG Not Detected 10/17/2023    FLUBAG Not Detected 10/17/2023    RAPSCRN Negative 10/17/2023    INR 1.10 (L) 12/26/2021    BILIRUBINUR Negative 10/01/2022       Procedures        Assessment and Plan    Diagnoses and all orders for this visit:    1. Behavior problem, adult (Primary)    2. Seizure disorder  -     Comprehensive Metabolic Panel    3. Other specified hypothyroidism  -     TSH    4. B12 deficiency  -     Vitamin B12  -     Folate  -     CBC & Differential    5. Hyponatremia  -     Comprehensive Metabolic Panel    6. Mild intellectual disability      Behavior Problem:  -will discontinue abilify      Medications Discontinued During This Encounter   Medication Reason    ARIPiprazole (Abilify) 2 MG tablet           Follow Up   Return in about 3 months (around 4/10/2024) for behavior problems.  Patient was given instructions and counseling regarding his condition or for health maintenance advice. Please see specific information pulled into the AVS if appropriate.       Rico Monzon MD  01/10/24  16:19 EST

## 2024-01-17 ENCOUNTER — TELEPHONE (OUTPATIENT)
Dept: INTERNAL MEDICINE | Facility: CLINIC | Age: 44
End: 2024-01-17
Payer: MEDICARE

## 2024-01-17 NOTE — TELEPHONE ENCOUNTER
Caller: NESTOR CRUZ    Relationship: Mother    Best call back number: [phone]    What test was performed: LABS     When was the test performed: 01/10/2024    Where was the test performed: IN OFFICE     Additional notes:   MOTHER IS CHECKING ON LAB RESULTS.

## 2024-01-17 NOTE — TELEPHONE ENCOUNTER
HUB TO READ GIVEN TO PATIENTS MOTHER WHO STATES SHE SPECIFICALLY WANTED TO KNOW ABOUT HIS SODIUM, VITAMIN D AND THYROID.    PATIENTS MOTHER ASKS CALL BACK TO ADVISE

## 2024-01-17 NOTE — TELEPHONE ENCOUNTER
Called Pt Mother No answer left VM to call back.    OK FOR HUB TO RELAY:   Labs overall are reassuring.     CMP shows no significant abnormalities.     CBC shows no significant abnormalities in red blood cells, white blood cells, and platelets.     B12 and folate are both within normal limits.     TSH is within normal limits.

## 2024-02-27 RX ORDER — DIVALPROEX SODIUM 500 MG/1
1000 TABLET, EXTENDED RELEASE ORAL 2 TIMES DAILY
Qty: 360 TABLET | Refills: 2 | Status: SHIPPED | OUTPATIENT
Start: 2024-02-27

## 2024-02-27 RX ORDER — CETIRIZINE HYDROCHLORIDE 10 MG/1
10 TABLET ORAL DAILY
Qty: 90 TABLET | Refills: 2 | Status: SHIPPED | OUTPATIENT
Start: 2024-02-27

## 2024-02-27 NOTE — TELEPHONE ENCOUNTER
Caller: MELINDA    Relationship: [facility] call back number: [phone]    Requested Prescriptions:   Requested Prescriptions     Pending Prescriptions Disp Refills    cetirizine (zyrTEC) 10 MG tablet 90 tablet 2     Sig: Take 1 tablet by mouth Daily.    divalproex (DEPAKOTE ER) 500 MG 24 hr tablet 360 tablet 2     Sig: Take 2 tablets by mouth 2 (Two) Times a Day.      Pharmacy where request should be sent:  [facility] [address] [place] [address] - [phone]  - [phone] FX     Last office visit with prescribing clinician: 1/10/2024   Last telemedicine visit with prescribing clinician: Visit date not found   Next office visit with prescribing clinician: 4/10/2024     Additional details provided by patient: CALLER STATED REQUEST FOR REFILLS OF 1 YEAR OF BOTH MEDICATION.     Does the patient have less than a 3 day supply:  [] Yes  [x] No    Dashawn Manning Rep   02/27/24 14:08 EST

## 2024-03-18 DIAGNOSIS — E03.8 OTHER SPECIFIED HYPOTHYROIDISM: ICD-10-CM

## 2024-03-18 DIAGNOSIS — F32.A DEPRESSION, UNSPECIFIED DEPRESSION TYPE: ICD-10-CM

## 2024-03-18 RX ORDER — ESCITALOPRAM OXALATE 10 MG/1
10 TABLET ORAL DAILY
Qty: 90 TABLET | Refills: 3 | OUTPATIENT
Start: 2024-03-18

## 2024-03-18 RX ORDER — LEVOTHYROXINE SODIUM 0.05 MG/1
50 TABLET ORAL
Qty: 90 TABLET | Refills: 3 | OUTPATIENT
Start: 2024-03-18

## 2024-03-25 DIAGNOSIS — J30.9 ALLERGIC RHINITIS, UNSPECIFIED SEASONALITY, UNSPECIFIED TRIGGER: ICD-10-CM

## 2024-03-25 RX ORDER — FLUTICASONE PROPIONATE 50 MCG
2 SPRAY, SUSPENSION (ML) NASAL DAILY PRN
Qty: 16 G | Refills: 3 | Status: SHIPPED | OUTPATIENT
Start: 2024-03-25

## 2024-04-10 ENCOUNTER — OFFICE VISIT (OUTPATIENT)
Dept: INTERNAL MEDICINE | Facility: CLINIC | Age: 44
End: 2024-04-10
Payer: MEDICARE

## 2024-04-10 VITALS
HEART RATE: 62 BPM | DIASTOLIC BLOOD PRESSURE: 70 MMHG | HEIGHT: 66 IN | WEIGHT: 191.6 LBS | OXYGEN SATURATION: 95 % | BODY MASS INDEX: 30.79 KG/M2 | TEMPERATURE: 98.3 F | SYSTOLIC BLOOD PRESSURE: 107 MMHG

## 2024-04-10 DIAGNOSIS — E87.1 HYPONATREMIA: ICD-10-CM

## 2024-04-10 DIAGNOSIS — E03.8 OTHER SPECIFIED HYPOTHYROIDISM: ICD-10-CM

## 2024-04-10 DIAGNOSIS — F70 MILD INTELLECTUAL DISABILITY: ICD-10-CM

## 2024-04-10 DIAGNOSIS — F69 BEHAVIOR PROBLEM, ADULT: Primary | ICD-10-CM

## 2024-04-10 LAB
ALBUMIN SERPL-MCNC: 4.1 G/DL (ref 3.5–5.2)
ALBUMIN/GLOB SERPL: 1.6 G/DL
ALP SERPL-CCNC: 58 U/L (ref 39–117)
ALT SERPL W P-5'-P-CCNC: 19 U/L (ref 1–41)
ANION GAP SERPL CALCULATED.3IONS-SCNC: 11.5 MMOL/L (ref 5–15)
AST SERPL-CCNC: 19 U/L (ref 1–40)
BILIRUB SERPL-MCNC: 0.4 MG/DL (ref 0–1.2)
BUN SERPL-MCNC: 14 MG/DL (ref 6–20)
BUN/CREAT SERPL: 19.7 (ref 7–25)
CALCIUM SPEC-SCNC: 8.8 MG/DL (ref 8.6–10.5)
CHLORIDE SERPL-SCNC: 99 MMOL/L (ref 98–107)
CO2 SERPL-SCNC: 24.5 MMOL/L (ref 22–29)
CREAT SERPL-MCNC: 0.71 MG/DL (ref 0.76–1.27)
EGFRCR SERPLBLD CKD-EPI 2021: 116.7 ML/MIN/1.73
GLOBULIN UR ELPH-MCNC: 2.6 GM/DL
GLUCOSE SERPL-MCNC: 67 MG/DL (ref 65–99)
POTASSIUM SERPL-SCNC: 4.6 MMOL/L (ref 3.5–5.2)
PROT SERPL-MCNC: 6.7 G/DL (ref 6–8.5)
SODIUM SERPL-SCNC: 135 MMOL/L (ref 136–145)
TSH SERPL DL<=0.05 MIU/L-ACNC: 2.19 UIU/ML (ref 0.27–4.2)

## 2024-04-10 PROCEDURE — 80053 COMPREHEN METABOLIC PANEL: CPT | Performed by: STUDENT IN AN ORGANIZED HEALTH CARE EDUCATION/TRAINING PROGRAM

## 2024-04-10 PROCEDURE — 84443 ASSAY THYROID STIM HORMONE: CPT | Performed by: STUDENT IN AN ORGANIZED HEALTH CARE EDUCATION/TRAINING PROGRAM

## 2024-04-10 NOTE — PROGRESS NOTES
Chief Complaint  Hypothyroidism    Subjective          Jaleel Garcia presents to [facility]ATR[facility]  History of Present Illness    Historian: Dari from [facility]    No interim behavior issues.  Seen and evaluated by nutrition in the interim.      PHQ-9 Depression Screening  Little interest or pleasure in doing things? 0-->not at all   Feeling down, depressed, or hopeless? 0-->not at all   Trouble falling or staying asleep, or sleeping too much?     Feeling tired or having little energy?     Poor appetite or overeating?     Feeling bad about yourself - or that you are a failure or have let yourself or your family down?     Trouble concentrating on things, such as reading the newspaper or watching television?     Moving or speaking so slowly that other people could have noticed? Or the opposite - being so fidgety or restless that you have been moving around a lot more than usual?     Thoughts that you would be better off dead, or of hurting yourself in some way?     PHQ-9 Total Score 0   If you checked off any problems, how difficult have these problems made it for you to do your work, take care of things at home, or get along with other people?         Current Outpatient Medications   Medication Instructions    acetaminophen (TYLENOL) 500 mg, Oral, Every 6 Hours PRN    bismuth subsalicylate (PEPTO BISMOL) 524 mg, Oral, As Needed    brompheniramine-pseudoephedrine-DM 30-2-10 MG/5ML syrup 10 mL, Oral, 4 Times Daily PRN    Calcium Carb-Cholecalciferol (Oyster Shell Calcium w/D) 500-5 MG-MCG tablet 1 tablet, Oral, Daily    cetirizine (ZYRTEC) 10 mg, Oral, Daily    desmopressin (DDAVP) 400 mcg, Oral, Nightly    divalproex (DEPAKOTE ER) 1,000 mg, Oral, 2 Times Daily    escitalopram (LEXAPRO) 10 mg, Oral, Daily, To start after titrates off sertraline.    fluticasone (FLONASE) 50 MCG/ACT nasal spray 2 sprays, Nasal, Daily PRN    levETIRAcetam (KEPPRA) 750 MG tablet Take 2 tabs BID  "   levothyroxine (SYNTHROID, LEVOTHROID) 50 mcg, Oral, Every Early Morning    neomycin-polymyxin-gramicidin (NEOSPORIN) 1.75-73843-.025 ophthalmic solution No dose, route, or frequency recorded.       The following portions of the patient's history were reviewed and updated as appropriate: allergies, current medications, past family history, past medical history, past social history, past surgical history, and problem list.    Objective   Vital Signs:   /70 (BP Location: Right arm, Patient Position: Sitting, Cuff Size: Adult)   Pulse 62   Temp 98.3 °F (36.8 °C) (Temporal)   Ht 167.6 cm (66\")   Wt 86.9 kg (191 lb 9.6 oz)   SpO2 95%   BMI 30.93 kg/m²     BP Readings from Last 3 Encounters:   04/10/24 107/70   01/10/24 109/69   12/13/23 97/64     Wt Readings from Last 3 Encounters:   04/10/24 86.9 kg (191 lb 9.6 oz)   01/10/24 86.4 kg (190 lb 6.4 oz)   12/13/23 80.5 kg (177 lb 6.4 oz)           Physical Exam  Vitals reviewed.   Constitutional:       General: He is not in acute distress.     Appearance: Normal appearance. He is not ill-appearing, toxic-appearing or diaphoretic.   HENT:      Head: Normocephalic and atraumatic.      Right Ear: External ear normal.      Left Ear: External ear normal.   Eyes:      Conjunctiva/sclera: Conjunctivae normal.   Cardiovascular:      Rate and Rhythm: Normal rate and regular rhythm.      Pulses: Normal pulses.      Heart sounds: Normal heart sounds. No murmur heard.     No friction rub. No gallop.   Pulmonary:      Effort: Pulmonary effort is normal. No respiratory distress.      Breath sounds: Normal breath sounds. No stridor. No wheezing, rhonchi or rales.   Chest:      Chest wall: No tenderness.   Abdominal:      General: Abdomen is flat.      Palpations: Abdomen is soft. There is no mass.      Tenderness: There is no abdominal tenderness.   Musculoskeletal:      Right lower leg: No edema.      Left lower leg: No edema.   Skin:     General: Skin is warm and dry. "   Neurological:      General: No focal deficit present.      Mental Status: He is alert. Mental status is at baseline.        Result Review :   The following data was reviewed by: Rico Monzon MD on 04/10/2024:  Common labs          5/17/2023    14:25 10/4/2023    14:45 1/10/2024    15:25   Common Labs   Glucose 79  84  70    BUN 13  15  15    Creatinine 0.71  0.78  0.69    Sodium 132  136  140    Potassium 4.7  4.6  4.7    Chloride 96  99  103    Calcium 9.2  9.4  9.2    Albumin 4.3  4.4  4.1    Total Bilirubin 0.6  0.5  0.5    Alkaline Phosphatase 55  56  47    AST (SGOT) 18  18  22    ALT (SGPT) 15  15  20    WBC  5.45  5.54    Hemoglobin  15.0  14.0    Hematocrit  44.3  41.5    Platelets  147  147    Total Cholesterol  121     Triglycerides  76     HDL Cholesterol  41     LDL Cholesterol   65              Lab Results   Component Value Date    SARSANTIGEN Detected (A) 10/17/2023    COVID19 NOT DETECTED 06/04/2020    FLUAAG Not Detected 10/17/2023    FLUBAG Not Detected 10/17/2023    RAPSCRN Negative 10/17/2023    INR 1.10 (L) 12/26/2021    BILIRUBINUR Negative 10/01/2022       Procedures        Assessment and Plan    Diagnoses and all orders for this visit:    1. Behavior problem, adult (Primary)    2. Hyponatremia  -     Comprehensive Metabolic Panel    3. Mild intellectual disability    4. Other specified hypothyroidism  -     TSH      Behavior Problem:  -resolved    Hypothyroid:  -on levothyroxine  -will check TSH today    There are no discontinued medications.       Follow Up   Return in about 6 months (around 10/10/2024) for Medicare Wellness.  Patient was given instructions and counseling regarding his condition or for health maintenance advice. Please see specific information pulled into the AVS if appropriate.       Rico Monzon MD  04/10/24  15:14 EDT

## 2024-04-18 ENCOUNTER — TELEPHONE (OUTPATIENT)
Dept: INTERNAL MEDICINE | Facility: CLINIC | Age: 44
End: 2024-04-18
Payer: MEDICARE

## 2024-04-18 NOTE — TELEPHONE ENCOUNTER
Dari with [facility] call in regards to previous script for fluid restriction.    States that he was previously on a 50 oz fluid restriction and if you would like to continue that for patient, they do need a new script.    Informed Dari we would call her back when we know more.

## 2024-04-19 NOTE — TELEPHONE ENCOUNTER
New script is in my sign folder.  Please scan, and let [facility] know they can pick it up at their convenience.

## 2024-06-26 ENCOUNTER — TELEPHONE (OUTPATIENT)
Dept: INTERNAL MEDICINE | Facility: CLINIC | Age: 44
End: 2024-06-26

## 2024-06-26 NOTE — TELEPHONE ENCOUNTER
Caller: NESTOR CRUZ    Relationship to patient: Mother    Best call back number: [phone]     Patient is needing: REQUESTING CALL BACK ABOUT HIS ANNUAL VISIT IN OCTOBER. SHE STATED HIS ORDERS WILL RUN OUT BEFORE THEN. PLEASE ADVISE.

## 2024-06-26 NOTE — TELEPHONE ENCOUNTER
Called Dari in the group home. The orders for his protocols are going to  soon and was asking if we could give him a statement in regards of his appt not being on 10/04/2024 due to scheduling along with a form of standing physician orders for tylenol, neosporin, etc as needed for the group home.       FAX NUMBER TO FAX STATEMENT OVER IF OK TO COMPLETE: [phone] Attn to Dari/Zulma

## 2024-06-28 ENCOUNTER — OFFICE VISIT (OUTPATIENT)
Dept: NEUROLOGY | Facility: CLINIC | Age: 44
End: 2024-06-28
Payer: MEDICARE

## 2024-06-28 ENCOUNTER — LAB (OUTPATIENT)
Dept: LAB | Facility: HOSPITAL | Age: 44
End: 2024-06-28
Payer: MEDICARE

## 2024-06-28 VITALS
DIASTOLIC BLOOD PRESSURE: 60 MMHG | WEIGHT: 191 LBS | HEIGHT: 66 IN | BODY MASS INDEX: 30.7 KG/M2 | SYSTOLIC BLOOD PRESSURE: 103 MMHG | HEART RATE: 72 BPM

## 2024-06-28 DIAGNOSIS — G40.909 SEIZURE DISORDER: Primary | ICD-10-CM

## 2024-06-28 DIAGNOSIS — G40.909 SEIZURE DISORDER: ICD-10-CM

## 2024-06-28 LAB — VALPROATE SERPL-MCNC: 80 MCG/ML (ref 50–125)

## 2024-06-28 PROCEDURE — 36415 COLL VENOUS BLD VENIPUNCTURE: CPT

## 2024-06-28 PROCEDURE — 99213 OFFICE O/P EST LOW 20 MIN: CPT | Performed by: PSYCHIATRY & NEUROLOGY

## 2024-06-28 PROCEDURE — 80177 DRUG SCRN QUAN LEVETIRACETAM: CPT

## 2024-06-28 PROCEDURE — 80164 ASSAY DIPROPYLACETIC ACD TOT: CPT

## 2024-06-28 RX ORDER — LEVETIRACETAM 750 MG/1
TABLET ORAL
Qty: 360 TABLET | Refills: 3 | Status: SHIPPED | OUTPATIENT
Start: 2024-06-28

## 2024-06-28 RX ORDER — IBUPROFEN 200 MG
1 TABLET ORAL AS NEEDED
COMMUNITY

## 2024-06-28 RX ORDER — DIVALPROEX SODIUM 500 MG/1
1000 TABLET, EXTENDED RELEASE ORAL 2 TIMES DAILY
Qty: 360 TABLET | Refills: 2 | Status: SHIPPED | OUTPATIENT
Start: 2024-06-28

## 2024-06-28 NOTE — PROGRESS NOTES
"Chief Complaint  Med Refill (Depakote, Keppra)    Subjective          Jaleel Garcia is a 44 y.o. male who presents to [facility]  History of Present Illness  43-year-old man here for follow-up of his seizures.  He has not had any seizures.  His caregiver is with him today.  He sees his mother every Thursday and he is at his mother's house every other weekend.  His brother Derek is with him sometimes during these visits.  He has a girlfriend by the name of Trisha who he is found with and they see each other in the  and sometimes they go out according to the caregiver.  He is very tickled with me mentioning about Trisha.    I reviewed the laboratory workup that was done this year.    Objective   Vital Signs:   /60   Pulse 72   Ht 167.2 cm (65.83\")   Wt 86.6 kg (191 lb)   BMI 30.99 kg/m²     Physical Exam   Alert, limited vocabulary, he put his jacket over his head initially when I came in the room until I started talking about Derek's girlfriend that he put his coat down and started smiling.  He started interacting with mainly solid touching me and giggling violently and making excited motions.  He was friendly throughout the encounter.  Station and gait is unremarkable.  Heart is regular and rhythm normal in rate        Assessment and Plan  Diagnoses and all orders for this visit:    1. Seizure disorder (Primary)  Assessment & Plan:  He has not had any recurrent seizures.  His personality is the same as would have seen him over the past 10+ years.  I will see him again in 1 years time for follow-up.  He is to get a Depakote level and Keppra level today.    Orders:  -     Valproic Acid Level, Total; Future  -     Levetiracetam Level (Keppra); Future         Total time spent with the patient and coordinating patient care was 25 minutes.    Follow Up  No follow-ups on file.  Patient was given instructions and counseling regarding his condition or for " health maintenance advice. Please see specific information pulled into the AVS if appropriate.

## 2024-06-28 NOTE — ASSESSMENT & PLAN NOTE
He has not had any recurrent seizures.  His personality is the same as would have seen him over the past 10+ years.  I will see him again in 1 years time for follow-up.  He is to get a Depakote level and Keppra level today.

## 2024-06-29 DIAGNOSIS — F32.A DEPRESSION, UNSPECIFIED DEPRESSION TYPE: ICD-10-CM

## 2024-06-29 DIAGNOSIS — E03.8 OTHER SPECIFIED HYPOTHYROIDISM: ICD-10-CM

## 2024-07-01 LAB — LEVETIRACETAM SERPL-MCNC: 50.4 UG/ML (ref 10–40)

## 2024-07-02 RX ORDER — ESCITALOPRAM OXALATE 10 MG/1
10 TABLET ORAL DAILY
Qty: 90 TABLET | Refills: 3 | Status: SHIPPED | OUTPATIENT
Start: 2024-07-02

## 2024-07-02 RX ORDER — DESMOPRESSIN ACETATE 0.2 MG/1
0.4 TABLET ORAL NIGHTLY
Qty: 180 TABLET | Refills: 3 | Status: SHIPPED | OUTPATIENT
Start: 2024-07-02

## 2024-07-02 RX ORDER — LEVOTHYROXINE SODIUM 0.05 MG/1
50 TABLET ORAL
Qty: 90 TABLET | Refills: 3 | Status: SHIPPED | OUTPATIENT
Start: 2024-07-02

## 2024-07-03 ENCOUNTER — TELEPHONE (OUTPATIENT)
Dept: NEUROLOGY | Facility: CLINIC | Age: 44
End: 2024-07-03
Payer: MEDICARE

## 2024-07-03 RX ORDER — DIVALPROEX SODIUM 500 MG/1
1000 TABLET, EXTENDED RELEASE ORAL 2 TIMES DAILY
Qty: 360 TABLET | Refills: 3 | Status: SHIPPED | OUTPATIENT
Start: 2024-07-03

## 2024-07-03 NOTE — TELEPHONE ENCOUNTER
Caller: MELINDA ALBARRAN    Relationship: Nursing Home- [facility]    Best call back number: [phone] -PLEASE LEAVE DETAILED VM IF UNABLE TO REACH DIRECTLY.    What was the call regarding: MELINDA STATES THEY ASKED DR. OLIVA TO SEND IN 12 MONTHS WORTH OF REFILLS OF BOTH KEPPRA AND DEPAKOTE AT HIS LAST APPT. KEPPRA WAS SENT FOR 12 MONTH SUPPLY, HOWEVER, THE DEPAKOTE WAS ONLY SENT FOR 9 MONTH SUPPLY. MELINDA ASKS IF OFFICE CAN CONTACT THE PHARMACY TO GIVE VERBAL TO INCREASE REFILL COUNT FROM 2 TO 3 REFILLS.    Do you require a callback: YES, PLEASE NOTIFY MELINDA UPON COMPLETION.    PLEASE REVIEW AND ADVISE.

## 2024-07-08 ENCOUNTER — DOCUMENTATION (OUTPATIENT)
Dept: INTERNAL MEDICINE | Facility: CLINIC | Age: 44
End: 2024-07-08
Payer: MEDICARE

## 2024-07-10 ENCOUNTER — OFFICE VISIT (OUTPATIENT)
Dept: INTERNAL MEDICINE | Facility: CLINIC | Age: 44
End: 2024-07-10
Payer: MEDICARE

## 2024-07-10 ENCOUNTER — TELEPHONE (OUTPATIENT)
Dept: INTERNAL MEDICINE | Facility: CLINIC | Age: 44
End: 2024-07-10

## 2024-07-10 VITALS
BODY MASS INDEX: 31.32 KG/M2 | TEMPERATURE: 101.6 F | OXYGEN SATURATION: 95 % | DIASTOLIC BLOOD PRESSURE: 62 MMHG | HEART RATE: 108 BPM | HEIGHT: 65 IN | SYSTOLIC BLOOD PRESSURE: 102 MMHG | WEIGHT: 188 LBS

## 2024-07-10 DIAGNOSIS — U07.1 COVID-19 VIRUS INFECTION: Primary | ICD-10-CM

## 2024-07-10 DIAGNOSIS — G40.909 SEIZURE DISORDER: ICD-10-CM

## 2024-07-10 DIAGNOSIS — F70 MILD INTELLECTUAL DISABILITY: ICD-10-CM

## 2024-07-10 LAB
EXPIRATION DATE: ABNORMAL
FLUAV AG UPPER RESP QL IA.RAPID: NOT DETECTED
FLUBV AG UPPER RESP QL IA.RAPID: NOT DETECTED
INTERNAL CONTROL: ABNORMAL
Lab: ABNORMAL
SARS-COV-2 AG UPPER RESP QL IA.RAPID: DETECTED

## 2024-07-10 PROCEDURE — G2211 COMPLEX E/M VISIT ADD ON: HCPCS | Performed by: STUDENT IN AN ORGANIZED HEALTH CARE EDUCATION/TRAINING PROGRAM

## 2024-07-10 PROCEDURE — 1159F MED LIST DOCD IN RCRD: CPT | Performed by: STUDENT IN AN ORGANIZED HEALTH CARE EDUCATION/TRAINING PROGRAM

## 2024-07-10 PROCEDURE — 1160F RVW MEDS BY RX/DR IN RCRD: CPT | Performed by: STUDENT IN AN ORGANIZED HEALTH CARE EDUCATION/TRAINING PROGRAM

## 2024-07-10 PROCEDURE — 99214 OFFICE O/P EST MOD 30 MIN: CPT | Performed by: STUDENT IN AN ORGANIZED HEALTH CARE EDUCATION/TRAINING PROGRAM

## 2024-07-10 PROCEDURE — 87428 SARSCOV & INF VIR A&B AG IA: CPT | Performed by: STUDENT IN AN ORGANIZED HEALTH CARE EDUCATION/TRAINING PROGRAM

## 2024-07-10 NOTE — LETTER
July 10, 2024    Jaleel Garcia  [address]  [place] [address]    To whom it may concern,   It is okay for Jaleel Garcia to increase fluid intake to 100 fluid oz for 10 days due to testing positive for covid.         Rico Monzon MD

## 2024-07-10 NOTE — LETTER
July 10, 2024    Jaleel Garcia  [address]  [place] [address]        To whom it may concern,  Jaleel Garcia may come out of quarantine after being fever free and no symptoms for 24 hours. Patient needs to wear a mask for 5 days after coming out of quarantine.           Rico Monzon MD

## 2024-07-10 NOTE — LETTER
July 10, 2024    Jaleel Garcia  [address]  [place] [address]    To whom it may concern,   The pharmacy did not have the plaxovid in stock. Medication has been discontinued for Jaleel Garcia due to all pharmacies being out of this medication at this time. It is okay for patient to take Brompheniramine-Pseudoephedrine inn the mean time to help with symptoms.         Rico Monzon MD

## 2024-07-10 NOTE — PROGRESS NOTES
Chief Complaint  covid pos (At home test was positive this am), Chills, Cough, and Nasal Congestion    Subjective          Jaleel Garcia presents to [facility]  History of Present Illness    Historian: Dari    Here for a sick visit.  Started yesterday, with cough and runny nose, sore throat, chills, and fever.  Tolerating PO.  No vomiting or diarrhea.  Had test at [facility] today that was positive for COVID.    Seizures are well controlled.    Spoke with his mother, Zofia Garcia, over the phone.  Advised her that paxlovid has a small interaction with depakote and could make it work less well.  I advised her that I felt that the benefits outweigh the risks.  She was agreeable to paxlovid.    Current Outpatient Medications   Medication Instructions    acetaminophen (TYLENOL) 500 mg, Oral, Every 6 Hours PRN    bismuth subsalicylate (PEPTO BISMOL) 524 mg, Oral, As Needed    brompheniramine-pseudoephedrine-DM 30-2-10 MG/5ML syrup 10 mL, Oral, 4 Times Daily PRN    Calcium Carb-Cholecalciferol (Oyster Shell Calcium w/D) 500-5 MG-MCG tablet 1 tablet, Oral, Daily    cetirizine (ZYRTEC) 10 mg, Oral, Daily    desmopressin (DDAVP) 400 mcg, Oral, Nightly    divalproex (DEPAKOTE ER) 1,000 mg, Oral, 2 Times Daily    escitalopram (LEXAPRO) 10 mg, Oral, Daily, To start after titrates off sertraline.    fluticasone (FLONASE) 50 MCG/ACT nasal spray 2 sprays, Nasal, Daily PRN    levETIRAcetam (KEPPRA) 750 MG tablet Take 2 tabs BID    levothyroxine (SYNTHROID, LEVOTHROID) 50 mcg, Oral, Every Early Morning    neomycin-bacitracin-polymyxin (NEOSPORIN) 5-400-5000 ointment 1 Application, Topical, As Needed    Nirmatrelvir & Ritonavir, 300mg/100mg, (PAXLOVID) 3 tablets, Oral, 2 Times Daily       The following portions of the patient's history were reviewed and updated as appropriate: allergies, current medications, past family history, past medical history, past social history, past surgical  "history, and problem list.    Objective   Vital Signs:   /62   Pulse 108   Temp (!) 101.6 °F (38.7 °C) (Temporal)   Ht 165.1 cm (65\")   Wt 85.3 kg (188 lb)   SpO2 95%   BMI 31.28 kg/m²     Wt Readings from Last 3 Encounters:   07/10/24 85.3 kg (188 lb)   06/28/24 86.6 kg (191 lb)   04/10/24 86.9 kg (191 lb 9.6 oz)     BP Readings from Last 3 Encounters:   07/10/24 102/62   06/28/24 103/60   04/10/24 107/70     Physical Exam  Vitals reviewed.   Constitutional:       General: He is not in acute distress.     Appearance: Normal appearance. He is not ill-appearing, toxic-appearing or diaphoretic.   HENT:      Head: Normocephalic and atraumatic.      Right Ear: External ear normal.      Left Ear: External ear normal.   Eyes:      Conjunctiva/sclera: Conjunctivae normal.   Cardiovascular:      Rate and Rhythm: Normal rate and regular rhythm.      Pulses: Normal pulses.      Heart sounds: Normal heart sounds. No murmur heard.     No friction rub. No gallop.   Pulmonary:      Effort: Pulmonary effort is normal. No respiratory distress.      Breath sounds: Normal breath sounds. No stridor. No wheezing, rhonchi or rales.   Chest:      Chest wall: No tenderness.   Abdominal:      General: Abdomen is flat.      Palpations: Abdomen is soft. There is no mass.      Tenderness: There is no abdominal tenderness.   Musculoskeletal:      Right lower leg: No edema.      Left lower leg: No edema.   Skin:     General: Skin is warm and dry.   Neurological:      Mental Status: He is alert. Mental status is at baseline.          Result Review :   The following data was reviewed by: Rico Monzon MD on 07/10/2024:  Common labs          10/4/2023    14:45 1/10/2024    15:25 4/10/2024    14:46   Common Labs   Glucose 84  70  67    BUN 15  15  14    Creatinine 0.78  0.69  0.71    Sodium 136  140  135    Potassium 4.6  4.7  4.6    Chloride 99  103  99    Calcium 9.4  9.2  8.8    Albumin 4.4  4.1  4.1    Total Bilirubin 0.5  0.5  " 0.4    Alkaline Phosphatase 56  47  58    AST (SGOT) 18  22  19    ALT (SGPT) 15  20  19    WBC 5.45  5.54     Hemoglobin 15.0  14.0     Hematocrit 44.3  41.5     Platelets 147  147     Total Cholesterol 121      Triglycerides 76      HDL Cholesterol 41      LDL Cholesterol  65               Lab Results   Component Value Date    SARSANTIGEN Detected (A) 07/10/2024    COVID19 NOT DETECTED 06/04/2020    FLUAAG Not Detected 07/10/2024    FLUBAG Not Detected 07/10/2024    RAPSCRN Negative 10/17/2023    INR 1.10 (L) 12/26/2021    BILIRUBINUR Negative 10/01/2022          Assessment and Plan    Diagnoses and all orders for this visit:    1. COVID-19 virus infection (Primary)  -     Nirmatrelvir & Ritonavir, 300mg/100mg, (PAXLOVID); Take 3 tablets by mouth 2 (Two) Times a Day.  Dispense: 30 tablet; Refill: 0  -     POCT SARS-CoV-2 + Flu Antigen KAILEY    2. Mild intellectual disability    3. Seizure disorder  -     Valproic Acid (Total+Free); Future      COVID:  -renal function reviewed  -med rec reviewed.  Small interaction with depakote noted  -overall, I do feel the benefits outweigh the risks  -will check valproic acid lever in 5 days  -cough medicine PRN  -tylenol PRN fever    Seizure disorder:  -well controlled  -will check depakote level in 5 days    There are no discontinued medications.       Follow Up   Will keep 10/16/24 appointment  Patient was given instructions and counseling regarding his condition or for health maintenance advice. Please see specific information pulled into the AVS if appropriate.       Rico Monzon MD  07/10/24  17:44 EDT

## 2024-07-10 NOTE — TELEPHONE ENCOUNTER
Susan called in to report the Paxlovid is not available at the patient's pharmacy.   I explained that it is not a medication he absolutely has to have but will help if they are able to get it.   She expressed they are won't order it at his current pharmacy and I told her they would need to call around to find where it is available and then let us know the pharmacy and we are happy to resend it there.  She verbalized understanding.

## 2024-07-22 ENCOUNTER — TELEPHONE (OUTPATIENT)
Dept: INTERNAL MEDICINE | Facility: CLINIC | Age: 44
End: 2024-07-22
Payer: MEDICARE

## 2024-07-23 NOTE — PROGRESS NOTES
"Chief Complaint  Earache (Bilateral ear pain x 1 week)    Subjective          Jaleel Garcia presents to [facility] INTER[facility] MEDICINE [facility] PEDIATRICS  History of Present Illness    Historian: Dari from [facility]    Here with complaint of right ear pain (off and on) since Right ear since the 15th or 16th.  He was seen on 7/10/24 and diagnosed with COVID infection.  Fever only lasted one day.  Dari reports that paxlovid was not available at pharmacy and for this reason he never took it.      Current Outpatient Medications   Medication Instructions    acetaminophen (TYLENOL) 500 mg, Every 6 Hours PRN    amoxicillin (AMOXIL) 875 mg, Oral, 2 Times Daily    bismuth subsalicylate (PEPTO BISMOL) 524 mg, As Needed    brompheniramine-pseudoephedrine-DM 30-2-10 MG/5ML syrup 10 mL, Oral, 4 Times Daily PRN    Calcium Carb-Cholecalciferol (Oyster Shell Calcium w/D) 500-5 MG-MCG tablet 1 tablet, Oral, Daily    cetirizine (ZYRTEC) 10 mg, Oral, Daily    desmopressin (DDAVP) 400 mcg, Oral, Nightly    divalproex (DEPAKOTE ER) 1,000 mg, Oral, 2 Times Daily    escitalopram (LEXAPRO) 10 mg, Oral, Daily, To start after titrates off sertraline.    fluticasone (FLONASE) 50 MCG/ACT nasal spray 2 sprays, Nasal, Daily PRN    levETIRAcetam (KEPPRA) 750 MG tablet Take 2 tabs BID    levothyroxine (SYNTHROID, LEVOTHROID) 50 mcg, Oral, Every Early Morning    neomycin-bacitracin-polymyxin (NEOSPORIN) 5-400-5000 ointment 1 Application, As Needed       The following portions of the patient's history were reviewed and updated as appropriate: allergies, current medications, past family history, past medical history, past social history, past surgical history, and problem list.    Objective   Vital Signs:   /66 (BP Location: Right arm, Patient Position: Sitting, Cuff Size: Adult)   Pulse 62   Temp 98.1 °F (36.7 °C) (Temporal)   Ht 165.1 cm (65\")   Wt 86.3 kg (190 lb 3.2 oz)   SpO2 98%   BMI 31.65 kg/m²     BP Readings from " Last 3 Encounters:   07/24/24 102/66   07/10/24 102/62   06/28/24 103/60     Wt Readings from Last 3 Encounters:   07/24/24 86.3 kg (190 lb 3.2 oz)   07/10/24 85.3 kg (188 lb)   06/28/24 86.6 kg (191 lb)           Physical Exam  Vitals reviewed.   Constitutional:       General: He is not in acute distress.     Appearance: Normal appearance. He is not ill-appearing, toxic-appearing or diaphoretic.   HENT:      Head: Normocephalic and atraumatic.      Right Ear: Ear canal and external ear normal.      Left Ear: Tympanic membrane, ear canal and external ear normal.      Ears:      Comments: Purulence noted under right TM  Eyes:      Conjunctiva/sclera: Conjunctivae normal.   Cardiovascular:      Rate and Rhythm: Normal rate and regular rhythm.      Pulses: Normal pulses.      Heart sounds: Normal heart sounds. No murmur heard.     No friction rub. No gallop.   Pulmonary:      Effort: Pulmonary effort is normal. No respiratory distress.      Breath sounds: Normal breath sounds. No stridor. No wheezing, rhonchi or rales.   Chest:      Chest wall: No tenderness.   Abdominal:      General: Abdomen is flat.      Palpations: Abdomen is soft. There is no mass.      Tenderness: There is no abdominal tenderness.   Musculoskeletal:      Right lower leg: No edema.      Left lower leg: No edema.   Skin:     General: Skin is warm and dry.   Neurological:      Mental Status: He is alert. Mental status is at baseline.   Psychiatric:         Behavior: Behavior normal.         Thought Content: Thought content normal.         Judgment: Judgment normal.          Result Review :   The following data was reviewed by: Rico Monzon MD on 07/24/2024:  Common labs          10/4/2023    14:45 1/10/2024    15:25 4/10/2024    14:46   Common Labs   Glucose 84  70  67    BUN 15  15  14    Creatinine 0.78  0.69  0.71    Sodium 136  140  135    Potassium 4.6  4.7  4.6    Chloride 99  103  99    Calcium 9.4  9.2  8.8    Albumin 4.4  4.1  4.1     Total Bilirubin 0.5  0.5  0.4    Alkaline Phosphatase 56  47  58    AST (SGOT) 18  22  19    ALT (SGPT) 15  20  19    WBC 5.45  5.54     Hemoglobin 15.0  14.0     Hematocrit 44.3  41.5     Platelets 147  147     Total Cholesterol 121      Triglycerides 76      HDL Cholesterol 41      LDL Cholesterol  65               Lab Results   Component Value Date    SARSANTIGEN Detected (A) 07/10/2024    COVID19 NOT DETECTED 06/04/2020    FLUAAG Not Detected 07/10/2024    FLUBAG Not Detected 07/10/2024    RAPSCRN Negative 10/17/2023    INR 1.10 (L) 12/26/2021    BILIRUBINUR Negative 10/01/2022            Assessment and Plan    Diagnoses and all orders for this visit:    1. Right otitis media, unspecified otitis media type (Primary)  -     amoxicillin (AMOXIL) 875 MG tablet; Take 1 tablet by mouth 2 (Two) Times a Day for 7 days.  Dispense: 14 tablet; Refill: 0    2. Otalgia, unspecified laterality    3. Mild intellectual disability          Medications Discontinued During This Encounter   Medication Reason    Nirmatrelvir & Ritonavir, 300mg/100mg, (PAXLOVID)           Follow Up   Return if symptoms worsen or fail to improve.  Will RTC on 10/16/24  Patient was given instructions and counseling regarding his condition or for health maintenance advice. Please see specific information pulled into the AVS if appropriate.       Rico Monzon MD  07/24/24  13:16 EDT

## 2024-07-24 ENCOUNTER — TELEPHONE (OUTPATIENT)
Dept: NEUROLOGY | Facility: CLINIC | Age: 44
End: 2024-07-24
Payer: MEDICARE

## 2024-07-24 ENCOUNTER — OFFICE VISIT (OUTPATIENT)
Dept: INTERNAL MEDICINE | Facility: CLINIC | Age: 44
End: 2024-07-24
Payer: MEDICARE

## 2024-07-24 VITALS
BODY MASS INDEX: 31.69 KG/M2 | SYSTOLIC BLOOD PRESSURE: 102 MMHG | HEIGHT: 65 IN | OXYGEN SATURATION: 98 % | DIASTOLIC BLOOD PRESSURE: 66 MMHG | TEMPERATURE: 98.1 F | WEIGHT: 190.2 LBS | HEART RATE: 62 BPM

## 2024-07-24 DIAGNOSIS — F70 MILD INTELLECTUAL DISABILITY: ICD-10-CM

## 2024-07-24 DIAGNOSIS — H92.09 OTALGIA, UNSPECIFIED LATERALITY: ICD-10-CM

## 2024-07-24 DIAGNOSIS — H66.91 RIGHT OTITIS MEDIA, UNSPECIFIED OTITIS MEDIA TYPE: Primary | ICD-10-CM

## 2024-07-24 PROCEDURE — 99213 OFFICE O/P EST LOW 20 MIN: CPT | Performed by: STUDENT IN AN ORGANIZED HEALTH CARE EDUCATION/TRAINING PROGRAM

## 2024-07-24 PROCEDURE — G2211 COMPLEX E/M VISIT ADD ON: HCPCS | Performed by: STUDENT IN AN ORGANIZED HEALTH CARE EDUCATION/TRAINING PROGRAM

## 2024-07-24 RX ORDER — AMOXICILLIN 875 MG/1
875 TABLET, COATED ORAL 2 TIMES DAILY
Qty: 14 TABLET | Refills: 0 | Status: SHIPPED | OUTPATIENT
Start: 2024-07-24 | End: 2024-07-31

## 2024-07-26 NOTE — TELEPHONE ENCOUNTER
Spoke with Dari at [facility] and let her know Dr. Ponce wanted pt to wean off Keppra and continue Depakote.  Dari stated the pt mother does not want to change any of his medications at this time. I let Dari know that Keppra itself has a side effect of agitation. Not sure what they wanted if not agreeable to tapering off Keppra.

## 2024-09-10 ENCOUNTER — TELEPHONE (OUTPATIENT)
Dept: INTERNAL MEDICINE | Facility: CLINIC | Age: 44
End: 2024-09-10
Payer: MEDICARE

## 2024-09-10 NOTE — TELEPHONE ENCOUNTER
MELINDA FROM [facility] CALLED AND STATES THAT  WANTS PATIENT TO STOP TAKING LEXAPRO, SHE STATES THAT SHE WAS TOLD  NEEDED TO D/C THIS MED? THEY ARE WANTING TO START PATIENT ON SERTRALINE 10MG BUT THEY DO NOT WANT HIM TO BE ON BOTH MEDICATIONS.

## 2024-09-11 NOTE — TELEPHONE ENCOUNTER
Dr. Montenegro is the patients psych doctor through [facility], sorry I should have put that in there. I spoke with Dari again, she states that Dr. Montenegro told her that she is not going to continue care for patient if Dr. Smiley is going to be sending in the lexapro still. Dari states that patient has been taking both lexapro and zoloft for a month now, also asking how to wean him off the lexapro. Dari states that she is okay with setting up an appt to discuss this better in person. I did inform her that Dr. Smiley is out for 2 weeks, she states she is okay with seeing any provider?

## 2024-09-11 NOTE — TELEPHONE ENCOUNTER
SPOKE WITH MELINDA FROM [facility], PATIENT IS SEEING DR BOUDREAUX AT [facility]. DR BOUDREAUX TOLD MELINDA THAT LEXAPRO NEEDED TO BE DC'D OR SHE WOULD NOT BE SENDING IN ZOLOFT ANYMORE. MEDICATION IS NOW DISCONTINUED. PATIENT REPORTED ON 07/24/2024 NOT TAKING LEXAPRO ANYMORE. MELINDA IS NEEDING A SCRIPT OR PRINT OUT OF PROOF THAT THIS MEDICATION HAS BEEN DC'D.

## 2024-09-11 NOTE — TELEPHONE ENCOUNTER
Dr. Monzon would not continue sending in the Lexapro if the psychiatrist is switching him to Zoloft because he cannot be on duplicate therapy I am sure Dr. Reza's would be fine with the patient switching to the sertraline and discontinuing the Lexapro does not need appointment

## 2024-10-15 NOTE — PROGRESS NOTES
Subjective   The ABCs of the Annual Wellness Visit  Medicare Wellness Visit      Jaleel Garcia is a 44 y.o. patient who presents for a Medicare Wellness Visit.    The following portions of the patient's history were reviewed and   updated as appropriate: allergies, current medications, past family history, past medical history, past social history, past surgical history, and problem list.    Compared to one year ago, the patient's physical   health is the same.  Compared to one year ago, the patient's mental   health is the same.    Recent Hospitalizations:  He was not admitted to the hospital during the last year.     Current Medical Providers:  Patient Care Team:  Rico Monzon MD as PCP - General (Internal Medicine)    Outpatient Medications Prior to Visit   Medication Sig Dispense Refill    divalproex (DEPAKOTE ER) 500 MG 24 hr tablet Take 2 tablets by mouth 2 (Two) Times a Day. 360 tablet 3    predniSONE (DELTASONE) 20 MG tablet Take 1 tablet by mouth Daily for 5 days. 5 tablet 0    sertraline (ZOLOFT) 50 MG tablet       brompheniramine-pseudoephedrine-DM 30-2-10 MG/5ML syrup Take 10 mL by mouth 4 (Four) Times a Day As Needed for Cough or Congestion. 473 mL 0    Calcium Carb-Cholecalciferol (Oyster Shell Calcium w/D) 500-5 MG-MCG tablet Take 1 tablet by mouth Daily. 90 tablet 3    cetirizine (zyrTEC) 10 MG tablet Take 1 tablet by mouth Daily. 90 tablet 2    desmopressin (DDAVP) 0.2 MG tablet Take 2 tablets by mouth Every Night. 180 tablet 3    fluticasone (FLONASE) 50 MCG/ACT nasal spray 2 sprays into the nostril(s) as directed by provider Daily As Needed for Rhinitis or Allergies. 16 g 3    levETIRAcetam (KEPPRA) 750 MG tablet Take 2 tabs  tablet 3    levothyroxine (SYNTHROID, LEVOTHROID) 50 MCG tablet Take 1 tablet by mouth Every Morning. 90 tablet 3    acetaminophen (TYLENOL) 500 MG tablet Take 1 tablet by mouth Every 6 (Six) Hours As Needed for Mild Pain. (Patient not taking: Reported on  "10/16/2024)      bismuth subsalicylate (PEPTO BISMOL) 262 MG chewable tablet Chew 2 tablets As Needed. (Patient not taking: Reported on 10/16/2024)      neomycin-bacitracin-polymyxin (NEOSPORIN) 5-400-5000 ointment Apply 1 Application topically to the appropriate area as directed As Needed. (Patient not taking: Reported on 10/16/2024)       No facility-administered medications prior to visit.     No opioid medication identified on active medication list. I have reviewed chart for other potential  high risk medication/s and harmful drug interactions in the elderly.      Aspirin is not on active medication list.  Aspirin use is not indicated based on review of current medical condition/s. Risk of harm outweighs potential benefits.  .    Patient Active Problem List   Diagnosis    Seizure disorder    Other specified hypothyroidism    Major depressive disorder, recurrent, in full remission    Allergic rhinitis, unspecified    Mild intellectual disability    Incontinence    Polydipsia    Epidermal cyst    Overweight (BMI 25.0-29.9)     Advance Care Planning Advance Directive is on file.  ACP discussion was held with the patient during this visit. Patient has an advance directive in EMR which is still valid.             Objective   Vitals:    10/16/24 0934   BP: 100/65   BP Location: Right arm   Patient Position: Sitting   Cuff Size: Adult   Pulse: 74   Temp: 98.6 °F (37 °C)   TempSrc: Temporal   SpO2: 94%   Weight: 86.6 kg (191 lb)   Height: 165.1 cm (65\")   PainSc: 0-No pain       Estimated body mass index is 31.78 kg/m² as calculated from the following:    Height as of this encounter: 165.1 cm (65\").    Weight as of this encounter: 86.6 kg (191 lb).            Does the patient have evidence of cognitive impairment?  Intellectual disability at baseline                                                                                                Health  Risk Assessment    Smoking Status:  Social History     Tobacco Use "   Smoking Status Never    Passive exposure: Never   Smokeless Tobacco Never     Alcohol Consumption:  Social History     Substance and Sexual Activity   Alcohol Use Never       Fall Risk Screen  STEADI Fall Risk Assessment was completed, and patient is at LOW risk for falls.Assessment completed on:10/16/2024    Depression Screening:      10/16/2024     9:41 AM   PHQ-2/PHQ-9 Depression Screening   Little interest or pleasure in doing things Not at all   Feeling down, depressed, or hopeless Not at all     Health Habits and Functional and Cognitive Screening:      10/16/2024     9:41 AM   Functional & Cognitive Status   Do you have difficulty preparing food and eating? No   Do you have difficulty bathing yourself, getting dressed or grooming yourself? No   Do you have difficulty using the toilet? No   Do you have difficulty moving around from place to place? No   Do you have trouble with steps or getting out of a bed or a chair? No   Current Diet Well Balanced Diet   Dental Exam Up to date   Eye Exam Up to date   Exercise (times per week) 7 times per week   Current Exercises Include Walking   Do you need help using the phone?  No   Are you deaf or do you have serious difficulty hearing?  No   Do you need help to go to places out of walking distance? Yes   Do you need help shopping? Yes   Do you need help preparing meals?  Yes   Do you need help with housework?  No   Do you need help with laundry? No   Do you need help taking your medications? Yes   Do you need help managing money? Yes   Do you ever drive or ride in a car without wearing a seat belt? No   Have you felt unusual stress, anger or loneliness in the last month? No   Who do you live with? Community   If you need help, do you have trouble finding someone available to you? No   Have you been bothered in the last four weeks by sexual problems? No   Do you have difficulty concentrating, remembering or making decisions? No           Age-appropriate Screening  Schedule:  Refer to the list below for future screening recommendations based on patient's age, sex and/or medical conditions. Orders for these recommended tests are listed in the plan section. The patient has been provided with a written plan.    Health Maintenance List  Health Maintenance   Topic Date Due    COVID-19 Vaccine (5 - 2023-24 season) 09/01/2024    BMI FOLLOWUP  12/13/2024    ANNUAL WELLNESS VISIT  10/16/2025    TDAP/TD VACCINES (2 - Td or Tdap) 10/18/2027    HEPATITIS C SCREENING  Completed    INFLUENZA VACCINE  Completed    Pneumococcal Vaccine 0-64  Aged Out                                                                                                                                                CMS Preventative Services Quick Reference  Risk Factors Identified During Encounter  Depression/Dysphoria:  stable, on zoloft  Immunizations Discussed/Encouraged: Influenza  Inactivity/Sedentary: Patient was advised to exercise at least 150 minutes a week per CDC recommendations.    The above risks/problems have been discussed with the patient.  Pertinent information has been shared with the patient in the After Visit Summary.  An After Visit Summary and PPPS were made available to the patient.    Follow Up:   Next Medicare Wellness visit to be scheduled in 1 year.          Additional E&M Note during same encounter follows:  Patient has multiple medical problems which are significant and separately identifiable that require additional work above and beyond the Medicare Wellness Visit.      Chief Complaint  Medicare Wellness-subsequent    Jaleel Garcia is a 44 y.o. male who presents to [facility]     Historian: Dari from [facility]    Seen at  10/13/24 with cough.  Had negative POC testing.  Given steroids for viral URI.  Cough is stable, and nonproductive.  No fever.  Otherwise well.    No interim seizures    Majors stopped escitalopram in August.  Still  "on sertraline.    Also here with sports physical paperwork (bowling).    Objective   Vital Signs:   Vitals:    10/16/24 0934   BP: 100/65   BP Location: Right arm   Patient Position: Sitting   Cuff Size: Adult   Pulse: 74   Temp: 98.6 °F (37 °C)   TempSrc: Temporal   SpO2: 94%   Weight: 86.6 kg (191 lb)   Height: 165.1 cm (65\")   PainSc: 0-No pain       Wt Readings from Last 3 Encounters:   10/16/24 86.6 kg (191 lb)   10/13/24 86.2 kg (190 lb)   07/24/24 86.3 kg (190 lb 3.2 oz)     BP Readings from Last 3 Encounters:   10/16/24 100/65   10/13/24 117/62   07/24/24 102/66       Physical Exam  Vitals reviewed.   Constitutional:       General: He is not in acute distress.     Appearance: Normal appearance. He is not ill-appearing, toxic-appearing or diaphoretic.   HENT:      Head: Normocephalic and atraumatic.      Right Ear: External ear normal.      Left Ear: External ear normal.   Eyes:      Conjunctiva/sclera: Conjunctivae normal.   Cardiovascular:      Rate and Rhythm: Normal rate and regular rhythm.      Pulses: Normal pulses.      Heart sounds: Normal heart sounds. No murmur heard.     No friction rub. No gallop.   Pulmonary:      Effort: Pulmonary effort is normal. No respiratory distress.      Breath sounds: Normal breath sounds. No stridor. No wheezing, rhonchi or rales.   Chest:      Chest wall: No tenderness.   Abdominal:      General: Abdomen is flat.      Palpations: Abdomen is soft. There is no mass.      Tenderness: There is no abdominal tenderness.   Musculoskeletal:      Right lower leg: No edema.      Left lower leg: No edema.   Skin:     General: Skin is warm and dry.   Neurological:      General: No focal deficit present.      Mental Status: He is alert. Mental status is at baseline.   Psychiatric:         Mood and Affect: Mood normal.         Behavior: Behavior normal.         Thought Content: Thought content normal.         Judgment: Judgment normal.         The following data was reviewed by " Rico Monzon MD on 10/16/2024        Assessment & Plan   Diagnoses and all orders for this visit:    1. Medicare annual wellness visit, subsequent (Primary)  -     TSH Rfx On Abnormal To Free T4  -     Lipid Panel  -     Comprehensive Metabolic Panel  -     CBC & Differential    2. Mild intellectual disability    3. Seizure disorder    4. Depression, unspecified depression type    5. Hyponatremia    6. Other specified hypothyroidism  -     levothyroxine (SYNTHROID, LEVOTHROID) 50 MCG tablet; Take 1 tablet by mouth Every Morning.  Dispense: 90 tablet; Refill: 3    7. Viral URI with cough  -     brompheniramine-pseudoephedrine-DM 30-2-10 MG/5ML syrup; Take 10 mL by mouth 4 (Four) Times a Day As Needed for Cough or Congestion.  Dispense: 473 mL; Refill: 0    8. Allergic rhinitis, unspecified seasonality, unspecified trigger  -     fluticasone (FLONASE) 50 MCG/ACT nasal spray; Administer 2 sprays into the nostril(s) as directed by provider Daily As Needed for Rhinitis or Allergies.  Dispense: 16 g; Refill: 3    9. Encounter for immunization  -     Fluzone >6mos    10. Routine sports physical exam    Other orders  -     Calcium Carb-Cholecalciferol (calcium 500 mg vitamin D 5 mcg, 200 UT,) 500-5 MG-MCG tablet per tablet; Take 1 tablet by mouth Daily.  Dispense: 90 tablet; Refill: 3  -     cetirizine (zyrTEC) 10 MG tablet; Take 1 tablet by mouth Daily.  Dispense: 90 tablet; Refill: 2  -     levETIRAcetam (KEPPRA) 750 MG tablet; Take 2 tabs BID  Dispense: 360 tablet; Refill: 3  -     desmopressin (DDAVP) 0.2 MG tablet; Take 2 tablets by mouth Every Night.  Dispense: 180 tablet; Refill: 3              Health Maintenance:  -nutritional counseling on the components of a heart healthy diet  -exercise counseling, recommending at least 2.5 hours of moderate exercise weekly  -Discussed risks/benefits to vaccination, reviewed components of the vaccine, discussed VIS, discussed informed consent, informed consent obtained.  Patient/Parent was allowed to accept or refuse vaccine. Questions answered to satisfactory state of patient/parent. We reviewed typical age appropriate and seasonally appropriate vaccinations. Reviewed immunization history and updated state vaccination form as needed. Patient/Parent was counseled on the above vaccines.      FOLLOW UP  Return in about 5 months (around 3/16/2025) for hyponatremia, hypothyroid.  Patient was given instructions and counseling regarding his condition or for health maintenance advice. Please see specific information pulled into the AVS if appropriate.     Rico Monzon MD  10/16/24  11:37 EDT   [de-identified] : PA and Lateral Scoliosis X-ray performed today demonstrates no significant scoliosis < 10 deg curve, No hemivertebrae or congenital deformity noted. The disc spaces are equal throughout spine. Risser 0\par

## 2024-10-16 ENCOUNTER — OFFICE VISIT (OUTPATIENT)
Dept: INTERNAL MEDICINE | Facility: CLINIC | Age: 44
End: 2024-10-16
Payer: MEDICARE

## 2024-10-16 VITALS
HEIGHT: 65 IN | WEIGHT: 191 LBS | SYSTOLIC BLOOD PRESSURE: 100 MMHG | HEART RATE: 74 BPM | BODY MASS INDEX: 31.82 KG/M2 | DIASTOLIC BLOOD PRESSURE: 65 MMHG | OXYGEN SATURATION: 94 % | TEMPERATURE: 98.6 F

## 2024-10-16 DIAGNOSIS — E87.1 HYPONATREMIA: ICD-10-CM

## 2024-10-16 DIAGNOSIS — F32.A DEPRESSION, UNSPECIFIED DEPRESSION TYPE: ICD-10-CM

## 2024-10-16 DIAGNOSIS — Z02.5 ROUTINE SPORTS PHYSICAL EXAM: ICD-10-CM

## 2024-10-16 DIAGNOSIS — G40.909 SEIZURE DISORDER: ICD-10-CM

## 2024-10-16 DIAGNOSIS — E03.8 OTHER SPECIFIED HYPOTHYROIDISM: ICD-10-CM

## 2024-10-16 DIAGNOSIS — F70 MILD INTELLECTUAL DISABILITY: ICD-10-CM

## 2024-10-16 DIAGNOSIS — J30.9 ALLERGIC RHINITIS, UNSPECIFIED SEASONALITY, UNSPECIFIED TRIGGER: ICD-10-CM

## 2024-10-16 DIAGNOSIS — J06.9 VIRAL URI WITH COUGH: ICD-10-CM

## 2024-10-16 DIAGNOSIS — Z23 ENCOUNTER FOR IMMUNIZATION: ICD-10-CM

## 2024-10-16 DIAGNOSIS — Z00.00 MEDICARE ANNUAL WELLNESS VISIT, SUBSEQUENT: Primary | ICD-10-CM

## 2024-10-16 LAB
ALBUMIN SERPL-MCNC: 3.8 G/DL (ref 3.5–5.2)
ALBUMIN/GLOB SERPL: 1.3 G/DL
ALP SERPL-CCNC: 56 U/L (ref 39–117)
ALT SERPL W P-5'-P-CCNC: 30 U/L (ref 1–41)
ANION GAP SERPL CALCULATED.3IONS-SCNC: 9.4 MMOL/L (ref 5–15)
AST SERPL-CCNC: 38 U/L (ref 1–40)
BASOPHILS # BLD AUTO: 0.01 10*3/MM3 (ref 0–0.2)
BASOPHILS NFR BLD AUTO: 0.2 % (ref 0–1.5)
BILIRUB SERPL-MCNC: 0.3 MG/DL (ref 0–1.2)
BUN SERPL-MCNC: 21 MG/DL (ref 6–20)
BUN/CREAT SERPL: 27.3 (ref 7–25)
CALCIUM SPEC-SCNC: 9.2 MG/DL (ref 8.6–10.5)
CHLORIDE SERPL-SCNC: 106 MMOL/L (ref 98–107)
CHOLEST SERPL-MCNC: 125 MG/DL (ref 0–200)
CO2 SERPL-SCNC: 26.6 MMOL/L (ref 22–29)
CREAT SERPL-MCNC: 0.77 MG/DL (ref 0.76–1.27)
DEPRECATED RDW RBC AUTO: 40.7 FL (ref 37–54)
EGFRCR SERPLBLD CKD-EPI 2021: 113.9 ML/MIN/1.73
EOSINOPHIL # BLD AUTO: 0 10*3/MM3 (ref 0–0.4)
EOSINOPHIL NFR BLD AUTO: 0 % (ref 0.3–6.2)
ERYTHROCYTE [DISTWIDTH] IN BLOOD BY AUTOMATED COUNT: 12.4 % (ref 12.3–15.4)
GLOBULIN UR ELPH-MCNC: 2.9 GM/DL
GLUCOSE SERPL-MCNC: 82 MG/DL (ref 65–99)
HCT VFR BLD AUTO: 41 % (ref 37.5–51)
HDLC SERPL-MCNC: 31 MG/DL (ref 40–60)
HGB BLD-MCNC: 13.7 G/DL (ref 13–17.7)
IMM GRANULOCYTES # BLD AUTO: 0.02 10*3/MM3 (ref 0–0.05)
IMM GRANULOCYTES NFR BLD AUTO: 0.4 % (ref 0–0.5)
LDLC SERPL CALC-MCNC: 77 MG/DL (ref 0–100)
LDLC/HDLC SERPL: 2.45 {RATIO}
LYMPHOCYTES # BLD AUTO: 1.13 10*3/MM3 (ref 0.7–3.1)
LYMPHOCYTES NFR BLD AUTO: 21.2 % (ref 19.6–45.3)
MCH RBC QN AUTO: 30 PG (ref 26.6–33)
MCHC RBC AUTO-ENTMCNC: 33.4 G/DL (ref 31.5–35.7)
MCV RBC AUTO: 89.9 FL (ref 79–97)
MONOCYTES # BLD AUTO: 0.22 10*3/MM3 (ref 0.1–0.9)
MONOCYTES NFR BLD AUTO: 4.1 % (ref 5–12)
NEUTROPHILS NFR BLD AUTO: 3.95 10*3/MM3 (ref 1.7–7)
NEUTROPHILS NFR BLD AUTO: 74.1 % (ref 42.7–76)
NRBC BLD AUTO-RTO: 0 /100 WBC (ref 0–0.2)
PLATELET # BLD AUTO: 205 10*3/MM3 (ref 140–450)
PMV BLD AUTO: 11.8 FL (ref 6–12)
POTASSIUM SERPL-SCNC: 4.7 MMOL/L (ref 3.5–5.2)
PROT SERPL-MCNC: 6.7 G/DL (ref 6–8.5)
RBC # BLD AUTO: 4.56 10*6/MM3 (ref 4.14–5.8)
SODIUM SERPL-SCNC: 142 MMOL/L (ref 136–145)
TRIGL SERPL-MCNC: 90 MG/DL (ref 0–150)
TSH SERPL DL<=0.05 MIU/L-ACNC: 1.55 UIU/ML (ref 0.27–4.2)
VLDLC SERPL-MCNC: 17 MG/DL (ref 5–40)
WBC NRBC COR # BLD AUTO: 5.33 10*3/MM3 (ref 3.4–10.8)

## 2024-10-16 PROCEDURE — G0008 ADMIN INFLUENZA VIRUS VAC: HCPCS | Performed by: STUDENT IN AN ORGANIZED HEALTH CARE EDUCATION/TRAINING PROGRAM

## 2024-10-16 PROCEDURE — 1170F FXNL STATUS ASSESSED: CPT | Performed by: STUDENT IN AN ORGANIZED HEALTH CARE EDUCATION/TRAINING PROGRAM

## 2024-10-16 PROCEDURE — G0439 PPPS, SUBSEQ VISIT: HCPCS | Performed by: STUDENT IN AN ORGANIZED HEALTH CARE EDUCATION/TRAINING PROGRAM

## 2024-10-16 PROCEDURE — 80053 COMPREHEN METABOLIC PANEL: CPT | Performed by: STUDENT IN AN ORGANIZED HEALTH CARE EDUCATION/TRAINING PROGRAM

## 2024-10-16 PROCEDURE — 85025 COMPLETE CBC W/AUTO DIFF WBC: CPT | Performed by: STUDENT IN AN ORGANIZED HEALTH CARE EDUCATION/TRAINING PROGRAM

## 2024-10-16 PROCEDURE — 80061 LIPID PANEL: CPT | Performed by: STUDENT IN AN ORGANIZED HEALTH CARE EDUCATION/TRAINING PROGRAM

## 2024-10-16 PROCEDURE — 90656 IIV3 VACC NO PRSV 0.5 ML IM: CPT | Performed by: STUDENT IN AN ORGANIZED HEALTH CARE EDUCATION/TRAINING PROGRAM

## 2024-10-16 PROCEDURE — 99214 OFFICE O/P EST MOD 30 MIN: CPT | Performed by: STUDENT IN AN ORGANIZED HEALTH CARE EDUCATION/TRAINING PROGRAM

## 2024-10-16 PROCEDURE — 1126F AMNT PAIN NOTED NONE PRSNT: CPT | Performed by: STUDENT IN AN ORGANIZED HEALTH CARE EDUCATION/TRAINING PROGRAM

## 2024-10-16 PROCEDURE — 84443 ASSAY THYROID STIM HORMONE: CPT | Performed by: STUDENT IN AN ORGANIZED HEALTH CARE EDUCATION/TRAINING PROGRAM

## 2024-10-16 RX ORDER — BROMPHENIRAMINE MALEATE, PSEUDOEPHEDRINE HYDROCHLORIDE, AND DEXTROMETHORPHAN HYDROBROMIDE 2; 30; 10 MG/5ML; MG/5ML; MG/5ML
10 SYRUP ORAL 4 TIMES DAILY PRN
Qty: 473 ML | Refills: 0 | Status: SHIPPED | OUTPATIENT
Start: 2024-10-16

## 2024-10-16 RX ORDER — DESMOPRESSIN ACETATE 0.2 MG/1
0.4 TABLET ORAL NIGHTLY
Qty: 180 TABLET | Refills: 3 | Status: SHIPPED | OUTPATIENT
Start: 2024-10-16

## 2024-10-16 RX ORDER — DIVALPROEX SODIUM 500 MG/1
1000 TABLET, FILM COATED, EXTENDED RELEASE ORAL 2 TIMES DAILY
Qty: 360 TABLET | Refills: 3 | Status: CANCELLED | OUTPATIENT
Start: 2024-10-16

## 2024-10-16 RX ORDER — LEVOTHYROXINE SODIUM 50 UG/1
50 TABLET ORAL
Qty: 90 TABLET | Refills: 3 | Status: SHIPPED | OUTPATIENT
Start: 2024-10-16

## 2024-10-16 RX ORDER — LEVETIRACETAM 750 MG/1
TABLET ORAL
Qty: 360 TABLET | Refills: 3 | Status: SHIPPED | OUTPATIENT
Start: 2024-10-16

## 2024-10-16 RX ORDER — DESMOPRESSIN ACETATE 0.2 MG/1
0.4 TABLET ORAL NIGHTLY
Qty: 180 TABLET | Refills: 3 | Status: CANCELLED | OUTPATIENT
Start: 2024-10-16

## 2024-10-16 RX ORDER — CETIRIZINE HYDROCHLORIDE 10 MG/1
10 TABLET ORAL DAILY
Qty: 90 TABLET | Refills: 2 | Status: SHIPPED | OUTPATIENT
Start: 2024-10-16

## 2024-10-16 RX ORDER — FLUTICASONE PROPIONATE 50 UG/1
2 SPRAY, METERED NASAL DAILY PRN
Qty: 16 G | Refills: 3 | Status: SHIPPED | OUTPATIENT
Start: 2024-10-16

## 2024-10-22 DIAGNOSIS — J30.9 ALLERGIC RHINITIS, UNSPECIFIED SEASONALITY, UNSPECIFIED TRIGGER: ICD-10-CM

## 2024-10-22 DIAGNOSIS — E03.8 OTHER SPECIFIED HYPOTHYROIDISM: ICD-10-CM

## 2024-10-22 DIAGNOSIS — J06.9 VIRAL URI WITH COUGH: ICD-10-CM

## 2024-10-22 RX ORDER — LEVETIRACETAM 750 MG/1
TABLET ORAL
Qty: 360 TABLET | Refills: 3 | Status: SHIPPED | OUTPATIENT
Start: 2024-10-22

## 2024-10-22 RX ORDER — DESMOPRESSIN ACETATE 0.2 MG/1
0.4 TABLET ORAL NIGHTLY
Qty: 180 TABLET | Refills: 3 | Status: SHIPPED | OUTPATIENT
Start: 2024-10-22

## 2024-10-22 RX ORDER — CETIRIZINE HYDROCHLORIDE 10 MG/1
10 TABLET ORAL DAILY
Qty: 90 TABLET | Refills: 2 | Status: SHIPPED | OUTPATIENT
Start: 2024-10-22

## 2024-10-22 RX ORDER — FLUTICASONE PROPIONATE 50 UG/1
2 SPRAY, METERED NASAL DAILY PRN
Qty: 16 G | Refills: 3 | Status: SHIPPED | OUTPATIENT
Start: 2024-10-22

## 2024-10-22 RX ORDER — LEVOTHYROXINE SODIUM 50 UG/1
50 TABLET ORAL
Qty: 90 TABLET | Refills: 3 | Status: SHIPPED | OUTPATIENT
Start: 2024-10-22

## 2024-10-22 RX ORDER — BROMPHENIRAMINE MALEATE, PSEUDOEPHEDRINE HYDROCHLORIDE, AND DEXTROMETHORPHAN HYDROBROMIDE 2; 30; 10 MG/5ML; MG/5ML; MG/5ML
10 SYRUP ORAL 4 TIMES DAILY PRN
Qty: 473 ML | Refills: 0 | Status: SHIPPED | OUTPATIENT
Start: 2024-10-22

## 2024-11-12 ENCOUNTER — TELEPHONE (OUTPATIENT)
Dept: INTERNAL MEDICINE | Facility: CLINIC | Age: 44
End: 2024-11-12
Payer: MEDICARE

## 2024-11-12 NOTE — TELEPHONE ENCOUNTER
Dari from [facility] called stating that last week zuly received extra fluids, she states he is doing just fine. But she just wanted to let us know incase that effects any future lab work.

## 2024-11-18 DIAGNOSIS — E87.1 HYPONATREMIA: Primary | ICD-10-CM

## 2024-11-20 ENCOUNTER — LAB (OUTPATIENT)
Dept: LAB | Facility: HOSPITAL | Age: 44
End: 2024-11-20
Payer: MEDICARE

## 2024-11-20 DIAGNOSIS — E87.1 HYPONATREMIA: ICD-10-CM

## 2024-11-20 LAB
ANION GAP SERPL CALCULATED.3IONS-SCNC: 5.4 MMOL/L (ref 5–15)
BUN SERPL-MCNC: 13 MG/DL (ref 6–20)
BUN/CREAT SERPL: 17.8 (ref 7–25)
CALCIUM SPEC-SCNC: 8.9 MG/DL (ref 8.6–10.5)
CHLORIDE SERPL-SCNC: 106 MMOL/L (ref 98–107)
CO2 SERPL-SCNC: 28.6 MMOL/L (ref 22–29)
CREAT SERPL-MCNC: 0.73 MG/DL (ref 0.76–1.27)
EGFRCR SERPLBLD CKD-EPI 2021: 115.8 ML/MIN/1.73
GLUCOSE SERPL-MCNC: 72 MG/DL (ref 65–99)
POTASSIUM SERPL-SCNC: 5 MMOL/L (ref 3.5–5.2)
SODIUM SERPL-SCNC: 140 MMOL/L (ref 136–145)

## 2024-11-20 PROCEDURE — 80048 BASIC METABOLIC PNL TOTAL CA: CPT

## 2024-11-20 PROCEDURE — 36415 COLL VENOUS BLD VENIPUNCTURE: CPT

## 2024-12-17 ENCOUNTER — TELEPHONE (OUTPATIENT)
Dept: INTERNAL MEDICINE | Facility: CLINIC | Age: 44
End: 2024-12-17
Payer: MEDICARE

## 2024-12-17 NOTE — TELEPHONE ENCOUNTER
Jeet from [facility] called and stated she needed to know what patient's diagnoses for fluid restriction is?   Call back number is [phone]. I did inform her it would be at least Thursday before I got back with her due to [facility] being out of office until that day. She stated that would be fine.

## 2025-01-20 ENCOUNTER — TELEPHONE (OUTPATIENT)
Dept: INTERNAL MEDICINE | Facility: CLINIC | Age: 45
End: 2025-01-20

## 2025-01-20 ENCOUNTER — TELEPHONE (OUTPATIENT)
Dept: INTERNAL MEDICINE | Facility: CLINIC | Age: 45
End: 2025-01-20
Payer: MEDICARE

## 2025-01-20 NOTE — TELEPHONE ENCOUNTER
PATIENTS CARE GIVER WAS TRANSFERRED AND CALL DROPPED    Hub staff attempted to follow warm transfer process and was unsuccessful     Caller: MELINDA LEUNG [facility]    Relationship to patient:     Best call back number: [phone]     Patient is needing: CARE STAFF STATES THEY NEED A CALL BACK ABOUT THE ZOLOFT. CARE STAFF STATES THEY HAVE CALLED PREVIOUSLY AND NEEDS A CALL BACK.

## 2025-01-20 NOTE — TELEPHONE ENCOUNTER
Caller: MELINDA ALBARRAN    Relationship: Caregiver (non-relative)    Best call back number: [phone]    What is the best time to reach you: 3:30PM    Who are you requesting to speak with (clinical staff, provider,  specific staff member): CLINICAL    What was the call regarding: CAREGIVER STATED THEY ARE HAVING ISSUES WITH CLIENT AND WOULD LIKE TO SPEAK TO CLINICAL STAFF TO SEE IF THERE IS ANY DOCUMENTATION ON WHY MD DUVAL TOOK HIM OFF OF MEDICATION ZOLOFT.

## 2025-01-20 NOTE — TELEPHONE ENCOUNTER
"I spoke with Dari Hicks, she is asking if there is anyway we can print out some type of letter or something stating why patient was being weaned off of his zoloft back in 2022, she states that Dr. Montenegro is requesting to have proof showing that patient was taken off the zoloft and a reasoning why. I tried to look back in his chart but the only thing I seen for him was \"Take 100 mg PO daily for next two weeks.  Then, 50 mg PO daily the following two weeks.  Then stop this medicine and start escitalopram\". I told Dari that we had this we could print out for her but she states she needs something to state why he was taken off the medication. I did inform her that Dr. Smiley is out of office until Wednesday and she said that was fine.   "

## 2025-06-24 ENCOUNTER — OFFICE VISIT (OUTPATIENT)
Dept: NEUROLOGY | Facility: CLINIC | Age: 45
End: 2025-06-24
Payer: MEDICARE

## 2025-06-24 VITALS
SYSTOLIC BLOOD PRESSURE: 101 MMHG | HEART RATE: 68 BPM | WEIGHT: 187 LBS | DIASTOLIC BLOOD PRESSURE: 60 MMHG | BODY MASS INDEX: 31.16 KG/M2 | HEIGHT: 65 IN

## 2025-06-24 DIAGNOSIS — G40.909 SEIZURE DISORDER: Primary | ICD-10-CM

## 2025-06-24 PROCEDURE — 99214 OFFICE O/P EST MOD 30 MIN: CPT | Performed by: PSYCHIATRY & NEUROLOGY

## 2025-06-24 PROCEDURE — 1160F RVW MEDS BY RX/DR IN RCRD: CPT | Performed by: PSYCHIATRY & NEUROLOGY

## 2025-06-24 PROCEDURE — 1159F MED LIST DOCD IN RCRD: CPT | Performed by: PSYCHIATRY & NEUROLOGY

## 2025-06-24 RX ORDER — LEVETIRACETAM 750 MG/1
TABLET ORAL
Qty: 360 TABLET | Refills: 3 | Status: SHIPPED | OUTPATIENT
Start: 2025-06-24

## 2025-06-24 RX ORDER — DULOXETINE HYDROCHLORIDE 60 MG/1
1 CAPSULE, DELAYED RELEASE ORAL EVERY 24 HOURS
COMMUNITY

## 2025-06-24 RX ORDER — DIVALPROEX SODIUM 500 MG/1
1000 TABLET, FILM COATED, EXTENDED RELEASE ORAL 2 TIMES DAILY
Qty: 360 TABLET | Refills: 3 | Status: SHIPPED | OUTPATIENT
Start: 2025-06-24

## 2025-06-24 NOTE — PROGRESS NOTES
"Chief Complaint  Follow-up (1 year follow up)    Subjective          Jaleel Garcia is a 45 y.o. male who presents to [facility]  History of Present Illness      History of Present Illness  The patient presents for seizures.    He has not experienced any recent seizures. His primary care physician is Dr. Hanh Oliveira. He is scheduled to have blood work done today with his primary care physician. He reports no instances of staggering. There have been no changes in his condition or activities, and he continues to function as he did prior to this visit. He is currently on a regimen of Depakote 500 mg and Keppra, both administered twice daily. His last lab work was conducted in 2024, with results within normal limits.    MEDICATIONS  CURRENT MEDS:  Depakote 500 mg Oral Twice daily  Keppra 750 mg Oral Twice daily      Objective   Vital Signs:   /60   Pulse 68   Ht 165.1 cm (65\")   Wt 84.8 kg (187 lb)   BMI 31.12 kg/m²     Physical Exam   Alert, follows simple commands, none verbal during this visit, initially not receptive although in the and became more comfortable and held me.  Heart is regular rhythm normal rate     Results           Assessment and Plan  Diagnoses and all orders for this visit:    1. Seizure disorder (Primary)  -     Valproic Acid Level, Total; Future  -     Levetiracetam Level (Keppra); Future    Other orders  -     levETIRAcetam (KEPPRA) 750 MG tablet; Take 2 tabs BID  Dispense: 360 tablet; Refill: 3  -     divalproex (DEPAKOTE ER) 500 MG 24 hr tablet; Take 2 tablets by mouth 2 (Two) Times a Day.  Dispense: 360 tablet; Refill: 3         Assessment & Plan  1. Seizures.  He is currently taking Depakote 500 mg, 2 tablets twice daily, and Keppra 750 mg, 2 tablets twice daily. A prescription for Depakote 500 mg and Keppra 750 mg has been provided. A valproic acid level test has been ordered to monitor the effectiveness of the treatment. If a " seizure occurs, follow the seizure protocol and contact the healthcare provider immediately.      Total time spent with the patient and coordinating patient care was 30 minutes.    Follow Up  No follow-ups on file.  Patient was given instructions and counseling regarding his condition or for health maintenance advice. Please see specific information pulled into the AVS if appropriate.     Patient or patient representative verbalized consent for the use of Ambient Listening during the visit with  Bruce Ponce MD for chart documentation. 6/24/2025  17:55 EDT

## 2025-08-11 ENCOUNTER — PATIENT MESSAGE (OUTPATIENT)
Dept: NEUROLOGY | Facility: CLINIC | Age: 45
End: 2025-08-11
Payer: MEDICARE